# Patient Record
Sex: FEMALE | Race: WHITE | NOT HISPANIC OR LATINO | Employment: OTHER
[De-identification: names, ages, dates, MRNs, and addresses within clinical notes are randomized per-mention and may not be internally consistent; named-entity substitution may affect disease eponyms.]

---

## 2017-06-03 ENCOUNTER — HEALTH MAINTENANCE LETTER (OUTPATIENT)
Age: 47
End: 2017-06-03

## 2023-06-23 ENCOUNTER — APPOINTMENT (OUTPATIENT)
Dept: GENERAL RADIOLOGY | Facility: CLINIC | Age: 53
End: 2023-06-23
Attending: EMERGENCY MEDICINE
Payer: MEDICARE

## 2023-06-23 ENCOUNTER — HOSPITAL ENCOUNTER (EMERGENCY)
Facility: CLINIC | Age: 53
Discharge: HOME | End: 2023-06-23
Attending: EMERGENCY MEDICINE | Admitting: EMERGENCY MEDICINE
Payer: MEDICARE

## 2023-06-23 VITALS
RESPIRATION RATE: 20 BRPM | OXYGEN SATURATION: 97 % | HEART RATE: 96 BPM | WEIGHT: 185 LBS | SYSTOLIC BLOOD PRESSURE: 116 MMHG | DIASTOLIC BLOOD PRESSURE: 82 MMHG | TEMPERATURE: 97.8 F

## 2023-06-23 DIAGNOSIS — F41.9 ANXIETY: ICD-10-CM

## 2023-06-23 DIAGNOSIS — I95.9 TRANSIENT HYPOTENSION: ICD-10-CM

## 2023-06-23 DIAGNOSIS — E11.65 HYPERGLYCEMIA DUE TO DIABETES MELLITUS (H): ICD-10-CM

## 2023-06-23 LAB
ANION GAP SERPL CALCULATED.3IONS-SCNC: 14 MMOL/L (ref 7–15)
ANION GAP SERPL CALCULATED.3IONS-SCNC: 16 MMOL/L (ref 7–15)
ATRIAL RATE - MUSE: 112 BPM
B-OH-BUTYR SERPL-SCNC: 0.2 MMOL/L
BASOPHILS # BLD AUTO: 0 10E3/UL (ref 0–0.2)
BASOPHILS NFR BLD AUTO: 0 %
BUN SERPL-MCNC: 16.8 MG/DL (ref 6–20)
BUN SERPL-MCNC: 17.5 MG/DL (ref 6–20)
CALCIUM SERPL-MCNC: 9.1 MG/DL (ref 8.6–10)
CALCIUM SERPL-MCNC: 9.7 MG/DL (ref 8.6–10)
CHLORIDE SERPL-SCNC: 95 MMOL/L (ref 98–107)
CHLORIDE SERPL-SCNC: 95 MMOL/L (ref 98–107)
CREAT SERPL-MCNC: 0.57 MG/DL (ref 0.51–0.95)
CREAT SERPL-MCNC: 0.7 MG/DL (ref 0.51–0.95)
DEPRECATED HCO3 PLAS-SCNC: 22 MMOL/L (ref 22–29)
DEPRECATED HCO3 PLAS-SCNC: 22 MMOL/L (ref 22–29)
DIASTOLIC BLOOD PRESSURE - MUSE: NORMAL MMHG
EOSINOPHIL # BLD AUTO: 0.3 10E3/UL (ref 0–0.7)
EOSINOPHIL NFR BLD AUTO: 3 %
ERYTHROCYTE [DISTWIDTH] IN BLOOD BY AUTOMATED COUNT: 13.5 % (ref 10–15)
GFR SERPL CREATININE-BSD FRML MDRD: >90 ML/MIN/1.73M2
GFR SERPL CREATININE-BSD FRML MDRD: >90 ML/MIN/1.73M2
GLUCOSE SERPL-MCNC: 397 MG/DL (ref 70–99)
GLUCOSE SERPL-MCNC: 529 MG/DL (ref 70–99)
HCT VFR BLD AUTO: 43.8 % (ref 35–47)
HGB BLD-MCNC: 14.5 G/DL (ref 11.7–15.7)
IMM GRANULOCYTES # BLD: 0 10E3/UL
IMM GRANULOCYTES NFR BLD: 0 %
INTERPRETATION ECG - MUSE: NORMAL
LYMPHOCYTES # BLD AUTO: 2.1 10E3/UL (ref 0.8–5.3)
LYMPHOCYTES NFR BLD AUTO: 19 %
MCH RBC QN AUTO: 28.6 PG (ref 26.5–33)
MCHC RBC AUTO-ENTMCNC: 33.1 G/DL (ref 31.5–36.5)
MCV RBC AUTO: 86 FL (ref 78–100)
MONOCYTES # BLD AUTO: 0.6 10E3/UL (ref 0–1.3)
MONOCYTES NFR BLD AUTO: 5 %
NEUTROPHILS # BLD AUTO: 8.3 10E3/UL (ref 1.6–8.3)
NEUTROPHILS NFR BLD AUTO: 73 %
NRBC # BLD AUTO: 0 10E3/UL
NRBC BLD AUTO-RTO: 0 /100
P AXIS - MUSE: 43 DEGREES
PLATELET # BLD AUTO: 336 10E3/UL (ref 150–450)
POTASSIUM SERPL-SCNC: 4.1 MMOL/L (ref 3.4–5.3)
POTASSIUM SERPL-SCNC: 4.7 MMOL/L (ref 3.4–5.3)
PR INTERVAL - MUSE: 124 MS
QRS DURATION - MUSE: 82 MS
QT - MUSE: 336 MS
QTC - MUSE: 458 MS
R AXIS - MUSE: 38 DEGREES
RBC # BLD AUTO: 5.07 10E6/UL (ref 3.8–5.2)
SODIUM SERPL-SCNC: 131 MMOL/L (ref 136–145)
SODIUM SERPL-SCNC: 133 MMOL/L (ref 136–145)
SYSTOLIC BLOOD PRESSURE - MUSE: NORMAL MMHG
T AXIS - MUSE: 57 DEGREES
TROPONIN T SERPL HS-MCNC: 9 NG/L
VENTRICULAR RATE- MUSE: 112 BPM
WBC # BLD AUTO: 11.3 10E3/UL (ref 4–11)

## 2023-06-23 PROCEDURE — 258N000003 HC RX IP 258 OP 636: Performed by: EMERGENCY MEDICINE

## 2023-06-23 PROCEDURE — 82310 ASSAY OF CALCIUM: CPT | Performed by: EMERGENCY MEDICINE

## 2023-06-23 PROCEDURE — 36415 COLL VENOUS BLD VENIPUNCTURE: CPT | Performed by: EMERGENCY MEDICINE

## 2023-06-23 PROCEDURE — 85025 COMPLETE CBC W/AUTO DIFF WBC: CPT | Performed by: EMERGENCY MEDICINE

## 2023-06-23 PROCEDURE — 84484 ASSAY OF TROPONIN QUANT: CPT | Performed by: EMERGENCY MEDICINE

## 2023-06-23 PROCEDURE — 82010 KETONE BODYS QUAN: CPT | Performed by: EMERGENCY MEDICINE

## 2023-06-23 PROCEDURE — 80048 BASIC METABOLIC PNL TOTAL CA: CPT | Performed by: EMERGENCY MEDICINE

## 2023-06-23 PROCEDURE — 99285 EMERGENCY DEPT VISIT HI MDM: CPT | Mod: 25

## 2023-06-23 PROCEDURE — 96361 HYDRATE IV INFUSION ADD-ON: CPT

## 2023-06-23 PROCEDURE — 71046 X-RAY EXAM CHEST 2 VIEWS: CPT

## 2023-06-23 PROCEDURE — 96374 THER/PROPH/DIAG INJ IV PUSH: CPT

## 2023-06-23 PROCEDURE — 250N000011 HC RX IP 250 OP 636: Mod: JZ | Performed by: EMERGENCY MEDICINE

## 2023-06-23 PROCEDURE — 93005 ELECTROCARDIOGRAM TRACING: CPT

## 2023-06-23 RX ORDER — ONDANSETRON 2 MG/ML
4 INJECTION INTRAMUSCULAR; INTRAVENOUS ONCE
Status: COMPLETED | OUTPATIENT
Start: 2023-06-23 | End: 2023-06-23

## 2023-06-23 RX ORDER — ONDANSETRON 2 MG/ML
4 INJECTION INTRAMUSCULAR; INTRAVENOUS ONCE
Status: DISCONTINUED | OUTPATIENT
Start: 2023-06-23 | End: 2023-06-23

## 2023-06-23 RX ADMIN — SODIUM CHLORIDE 1000 ML: 9 INJECTION, SOLUTION INTRAVENOUS at 16:35

## 2023-06-23 RX ADMIN — SODIUM CHLORIDE 1000 ML: 9 INJECTION, SOLUTION INTRAVENOUS at 19:19

## 2023-06-23 RX ADMIN — ONDANSETRON 4 MG: 2 INJECTION INTRAMUSCULAR; INTRAVENOUS at 19:29

## 2023-06-23 ASSESSMENT — ACTIVITIES OF DAILY LIVING (ADL): ADLS_ACUITY_SCORE: 33

## 2023-06-23 NOTE — ED TRIAGE NOTES
Patient being referred from [facility] for low BP. Per EMS report patient BP was 80s systolic. Patient reports feeling dizzy and lightheaded with standing and walking. BP on arrival was 91/64- patient denies symptoms.      Triage Assessment     Row Name 06/23/23 9173       Triage Assessment (Adult)    Airway WDL WDL       Respiratory WDL    Respiratory WDL WDL       Skin Circulation/Temperature WDL    Skin Circulation/Temperature WDL WDL       Cardiac WDL    Cardiac WDL WDL       Peripheral/Neurovascular WDL    Peripheral Neurovascular WDL WDL       Cognitive/Neuro/Behavioral WDL    Cognitive/Neuro/Behavioral WDL WDL

## 2023-06-23 NOTE — ED NOTES
Rapid Assessment Note    History:   Sandra Lilly is a 53 year old female who presents to the ED for hypotension. Patient reports having a systolic blood pressure in the low 80s and previous TBI history, and she endorsed dizziness and lightheadedness. She reports history of anxiety. She states her last TBI treatment with brain stimulation was in the summer of 2021 and she usually gets hypotensive as a result. She reports some chest pain from anxiety that is now bilateral, previously mainly right sided. She denies fever, vomiting, or diarrhea. She reports regular nausea that she takes Zofran for and regular constipation due to her medications.      Exam:   General: Alert, No distress. Nontoxic appearance  Head: No signs of trauma.   Mouth/Throat: Oropharynx moist.   Eyes: Conjunctivae are normal. Pupils are equal..   Neck: Normal range of motion.    CV: Appears well perfused.Slightly tachycardic  Resp:No respiratory distress.  lungs are clear  MSK: Normal range of motion. No obvious deformity.   Neuro: The patient is alert and interactive. MAHMOOD. Speech normal. GCS 15  Skin: No lesion or sign of trauma noted.   Psych: normal mood and affect. behavior is normal.      Plan of Care:   I evaluated the patient and developed an initial plan of care. I discussed this plan and explained that I, or one of my partners, would be returning to complete the evaluation.     I, Iesha Sultana, am serving as a scribe to document services personally performed by Epifanio Terry MD, based on my observations and the provider's statements to me.    6/23/2023  [employer]    Portions of this medical record were completed by a scribe. UPON MY REVIEW AND AUTHENTICATION BY ELECTRONIC SIGNATURE, this confirms (a) I performed the applicable clinical services, and (b) the record is accurate.      Epifanio Terry MD  06/24/23 0158

## 2023-06-24 NOTE — DISCHARGE INSTRUCTIONS
Your blood sugars are uncontrolled.  Follow-up with your primary care doctor by phone on Monday to get restarted on your medications.  Hold your blood pressure medication, Hyzaar, until you follow-up with your primary care doctor.  Check your blood pressures once a day in the morning and do this for the next few weeks to get additional data.   4 = No assist / stand by assistance
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
## 2025-02-16 ENCOUNTER — APPOINTMENT (OUTPATIENT)
Dept: GENERAL RADIOLOGY | Facility: CLINIC | Age: 55
DRG: 202 | End: 2025-02-16
Attending: EMERGENCY MEDICINE
Payer: MEDICARE

## 2025-02-16 ENCOUNTER — HOSPITAL ENCOUNTER (INPATIENT)
Facility: CLINIC | Age: 55
LOS: 3 days | Discharge: HOME | DRG: 202 | End: 2025-02-19
Attending: EMERGENCY MEDICINE | Admitting: INTERNAL MEDICINE
Payer: MEDICARE

## 2025-02-16 DIAGNOSIS — E11.9 TYPE 2 DIABETES MELLITUS WITHOUT COMPLICATION, WITHOUT LONG-TERM CURRENT USE OF INSULIN (H): Primary | ICD-10-CM

## 2025-02-16 DIAGNOSIS — J45.901 MILD ASTHMA WITH EXACERBATION, UNSPECIFIED WHETHER PERSISTENT: ICD-10-CM

## 2025-02-16 DIAGNOSIS — R73.9 HYPERGLYCEMIA: ICD-10-CM

## 2025-02-16 LAB
ALBUMIN SERPL BCG-MCNC: 4.3 G/DL (ref 3.5–5.2)
ALP SERPL-CCNC: 116 U/L (ref 40–150)
ALT SERPL W P-5'-P-CCNC: 30 U/L (ref 0–50)
ANION GAP SERPL CALCULATED.3IONS-SCNC: 16 MMOL/L (ref 7–15)
AST SERPL W P-5'-P-CCNC: 35 U/L (ref 0–45)
B-OH-BUTYR SERPL-SCNC: 0.49 MMOL/L
BASOPHILS # BLD AUTO: 0.1 10E3/UL (ref 0–0.2)
BASOPHILS NFR BLD AUTO: 0 %
BILIRUB SERPL-MCNC: 0.3 MG/DL
BUN SERPL-MCNC: 12.6 MG/DL (ref 6–20)
CALCIUM SERPL-MCNC: 9.6 MG/DL (ref 8.8–10.4)
CHLORIDE SERPL-SCNC: 94 MMOL/L (ref 98–107)
CREAT SERPL-MCNC: 0.43 MG/DL (ref 0.51–0.95)
EGFRCR SERPLBLD CKD-EPI 2021: >90 ML/MIN/1.73M2
EOSINOPHIL # BLD AUTO: 0.2 10E3/UL (ref 0–0.7)
EOSINOPHIL NFR BLD AUTO: 2 %
ERYTHROCYTE [DISTWIDTH] IN BLOOD BY AUTOMATED COUNT: 12.5 % (ref 10–15)
EST. AVERAGE GLUCOSE BLD GHB EST-MCNC: 372 MG/DL
FLUAV RNA SPEC QL NAA+PROBE: NEGATIVE
FLUBV RNA RESP QL NAA+PROBE: NEGATIVE
GLUCOSE BLDC GLUCOMTR-MCNC: 297 MG/DL (ref 70–99)
GLUCOSE BLDC GLUCOMTR-MCNC: 322 MG/DL (ref 70–99)
GLUCOSE BLDC GLUCOMTR-MCNC: 370 MG/DL (ref 70–99)
GLUCOSE BLDC GLUCOMTR-MCNC: 419 MG/DL (ref 70–99)
GLUCOSE BLDC GLUCOMTR-MCNC: 433 MG/DL (ref 70–99)
GLUCOSE BLDC GLUCOMTR-MCNC: 488 MG/DL (ref 70–99)
GLUCOSE BLDC GLUCOMTR-MCNC: 497 MG/DL (ref 70–99)
GLUCOSE BLDC GLUCOMTR-MCNC: 543 MG/DL (ref 70–99)
GLUCOSE BLDC GLUCOMTR-MCNC: 545 MG/DL (ref 70–99)
GLUCOSE BLDC GLUCOMTR-MCNC: 594 MG/DL (ref 70–99)
GLUCOSE SERPL-MCNC: 603 MG/DL (ref 70–99)
HBA1C MFR BLD: 14.6 %
HCO3 BLDV-SCNC: 26 MMOL/L (ref 21–28)
HCO3 SERPL-SCNC: 22 MMOL/L (ref 22–29)
HCT VFR BLD AUTO: 44.6 % (ref 35–47)
HGB BLD-MCNC: 14.8 G/DL (ref 11.7–15.7)
IMM GRANULOCYTES # BLD: 0.1 10E3/UL
IMM GRANULOCYTES NFR BLD: 1 %
LACTATE BLD-SCNC: 2.1 MMOL/L
LACTATE SERPL-SCNC: 1.6 MMOL/L (ref 0.7–2)
LYMPHOCYTES # BLD AUTO: 2.6 10E3/UL (ref 0.8–5.3)
LYMPHOCYTES NFR BLD AUTO: 20 %
MCH RBC QN AUTO: 28.1 PG (ref 26.5–33)
MCHC RBC AUTO-ENTMCNC: 33.2 G/DL (ref 31.5–36.5)
MCV RBC AUTO: 85 FL (ref 78–100)
MONOCYTES # BLD AUTO: 1.1 10E3/UL (ref 0–1.3)
MONOCYTES NFR BLD AUTO: 8 %
NEUTROPHILS # BLD AUTO: 8.8 10E3/UL (ref 1.6–8.3)
NEUTROPHILS NFR BLD AUTO: 68 %
NRBC # BLD AUTO: 0 10E3/UL
NRBC BLD AUTO-RTO: 0 /100
PCO2 BLDV: 35 MM HG (ref 40–50)
PH BLDV: 7.48 [PH] (ref 7.32–7.43)
PLATELET # BLD AUTO: 436 10E3/UL (ref 150–450)
PO2 BLDV: 36 MM HG (ref 25–47)
POTASSIUM SERPL-SCNC: 5 MMOL/L (ref 3.4–5.3)
PROCALCITONIN SERPL IA-MCNC: 0.22 NG/ML
PROT SERPL-MCNC: 7.7 G/DL (ref 6.4–8.3)
RBC # BLD AUTO: 5.26 10E6/UL (ref 3.8–5.2)
RSV RNA SPEC NAA+PROBE: NEGATIVE
S PYO DNA THROAT QL NAA+PROBE: NOT DETECTED
SAO2 % BLDV: 74 % (ref 70–75)
SARS-COV-2 RNA RESP QL NAA+PROBE: NEGATIVE
SODIUM SERPL-SCNC: 132 MMOL/L (ref 135–145)
T4 FREE SERPL-MCNC: 0.81 NG/DL (ref 0.9–1.7)
TROPONIN T SERPL HS-MCNC: 7 NG/L
TSH SERPL DL<=0.005 MIU/L-ACNC: 40.12 UIU/ML (ref 0.3–4.2)
WBC # BLD AUTO: 12.8 10E3/UL (ref 4–11)

## 2025-02-16 PROCEDURE — 250N000013 HC RX MED GY IP 250 OP 250 PS 637: Performed by: INTERNAL MEDICINE

## 2025-02-16 PROCEDURE — 36415 COLL VENOUS BLD VENIPUNCTURE: CPT | Performed by: EMERGENCY MEDICINE

## 2025-02-16 PROCEDURE — 250N000011 HC RX IP 250 OP 636: Mod: JZ | Performed by: EMERGENCY MEDICINE

## 2025-02-16 PROCEDURE — 120N000001 HC R&B MED SURG/OB

## 2025-02-16 PROCEDURE — 84484 ASSAY OF TROPONIN QUANT: CPT | Performed by: EMERGENCY MEDICINE

## 2025-02-16 PROCEDURE — 94640 AIRWAY INHALATION TREATMENT: CPT

## 2025-02-16 PROCEDURE — 93005 ELECTROCARDIOGRAM TRACING: CPT

## 2025-02-16 PROCEDURE — 82803 BLOOD GASES ANY COMBINATION: CPT

## 2025-02-16 PROCEDURE — 250N000009 HC RX 250: Performed by: INTERNAL MEDICINE

## 2025-02-16 PROCEDURE — 99285 EMERGENCY DEPT VISIT HI MDM: CPT | Mod: 25

## 2025-02-16 PROCEDURE — 999N000156 HC STATISTIC RCP CONSULT EA 30 MIN

## 2025-02-16 PROCEDURE — 82962 GLUCOSE BLOOD TEST: CPT

## 2025-02-16 PROCEDURE — 250N000012 HC RX MED GY IP 250 OP 636 PS 637: Performed by: INTERNAL MEDICINE

## 2025-02-16 PROCEDURE — 99223 1ST HOSP IP/OBS HIGH 75: CPT | Mod: AI | Performed by: INTERNAL MEDICINE

## 2025-02-16 PROCEDURE — 80053 COMPREHEN METABOLIC PANEL: CPT | Performed by: EMERGENCY MEDICINE

## 2025-02-16 PROCEDURE — 85041 AUTOMATED RBC COUNT: CPT | Performed by: EMERGENCY MEDICINE

## 2025-02-16 PROCEDURE — 87651 STREP A DNA AMP PROBE: CPT | Performed by: EMERGENCY MEDICINE

## 2025-02-16 PROCEDURE — 83036 HEMOGLOBIN GLYCOSYLATED A1C: CPT | Performed by: EMERGENCY MEDICINE

## 2025-02-16 PROCEDURE — 258N000003 HC RX IP 258 OP 636: Performed by: EMERGENCY MEDICINE

## 2025-02-16 PROCEDURE — 71045 X-RAY EXAM CHEST 1 VIEW: CPT

## 2025-02-16 PROCEDURE — 84439 ASSAY OF FREE THYROXINE: CPT | Performed by: INTERNAL MEDICINE

## 2025-02-16 PROCEDURE — 250N000009 HC RX 250: Performed by: EMERGENCY MEDICINE

## 2025-02-16 PROCEDURE — 84443 ASSAY THYROID STIM HORMONE: CPT | Performed by: INTERNAL MEDICINE

## 2025-02-16 PROCEDURE — 250N000011 HC RX IP 250 OP 636: Performed by: INTERNAL MEDICINE

## 2025-02-16 PROCEDURE — 94640 AIRWAY INHALATION TREATMENT: CPT | Mod: 76

## 2025-02-16 PROCEDURE — 84145 PROCALCITONIN (PCT): CPT | Performed by: EMERGENCY MEDICINE

## 2025-02-16 PROCEDURE — 999N000157 HC STATISTIC RCP TIME EA 10 MIN

## 2025-02-16 PROCEDURE — 87637 SARSCOV2&INF A&B&RSV AMP PRB: CPT | Performed by: EMERGENCY MEDICINE

## 2025-02-16 PROCEDURE — 96374 THER/PROPH/DIAG INJ IV PUSH: CPT

## 2025-02-16 PROCEDURE — 258N000003 HC RX IP 258 OP 636: Performed by: INTERNAL MEDICINE

## 2025-02-16 PROCEDURE — 83605 ASSAY OF LACTIC ACID: CPT | Performed by: EMERGENCY MEDICINE

## 2025-02-16 PROCEDURE — 85025 COMPLETE CBC W/AUTO DIFF WBC: CPT | Performed by: EMERGENCY MEDICINE

## 2025-02-16 PROCEDURE — 82010 KETONE BODYS QUAN: CPT | Performed by: EMERGENCY MEDICINE

## 2025-02-16 RX ORDER — METHYLPREDNISOLONE SODIUM SUCCINATE 40 MG/ML
40 INJECTION INTRAMUSCULAR; INTRAVENOUS 2 TIMES DAILY
Status: DISCONTINUED | OUTPATIENT
Start: 2025-02-16 | End: 2025-02-16

## 2025-02-16 RX ORDER — GUAIFENESIN 200 MG/10ML
200 LIQUID ORAL EVERY 4 HOURS PRN
Status: DISCONTINUED | OUTPATIENT
Start: 2025-02-16 | End: 2025-02-19 | Stop reason: HOSPADM

## 2025-02-16 RX ORDER — LEVOTHYROXINE SODIUM 100 UG/1
100 TABLET ORAL
Status: DISCONTINUED | OUTPATIENT
Start: 2025-02-16 | End: 2025-02-19 | Stop reason: HOSPADM

## 2025-02-16 RX ORDER — METFORMIN HYDROCHLORIDE 500 MG/1
1000 TABLET, EXTENDED RELEASE ORAL DAILY
COMMUNITY

## 2025-02-16 RX ORDER — IPRATROPIUM BROMIDE AND ALBUTEROL SULFATE 2.5; .5 MG/3ML; MG/3ML
3 SOLUTION RESPIRATORY (INHALATION)
Status: COMPLETED | OUTPATIENT
Start: 2025-02-16 | End: 2025-02-16

## 2025-02-16 RX ORDER — ONDANSETRON 2 MG/ML
4 INJECTION INTRAMUSCULAR; INTRAVENOUS EVERY 6 HOURS PRN
Status: DISCONTINUED | OUTPATIENT
Start: 2025-02-16 | End: 2025-02-19 | Stop reason: HOSPADM

## 2025-02-16 RX ORDER — HYDROXYZINE HYDROCHLORIDE 25 MG/1
25 TABLET, FILM COATED ORAL ONCE
Status: COMPLETED | OUTPATIENT
Start: 2025-02-16 | End: 2025-02-16

## 2025-02-16 RX ORDER — BUPROPION HYDROCHLORIDE 150 MG/1
450 TABLET ORAL AT BEDTIME
Status: DISCONTINUED | OUTPATIENT
Start: 2025-02-16 | End: 2025-02-16

## 2025-02-16 RX ORDER — OXYCODONE HYDROCHLORIDE 10 MG/1
10 TABLET ORAL EVERY 4 HOURS PRN
COMMUNITY

## 2025-02-16 RX ORDER — IPRATROPIUM BROMIDE AND ALBUTEROL SULFATE 2.5; .5 MG/3ML; MG/3ML
3 SOLUTION RESPIRATORY (INHALATION)
Status: DISCONTINUED | OUTPATIENT
Start: 2025-02-16 | End: 2025-02-19 | Stop reason: HOSPADM

## 2025-02-16 RX ORDER — NICOTINE POLACRILEX 4 MG
15-30 LOZENGE BUCCAL
Status: DISCONTINUED | OUTPATIENT
Start: 2025-02-16 | End: 2025-02-16

## 2025-02-16 RX ORDER — ACETAMINOPHEN 325 MG/1
650 TABLET ORAL EVERY 4 HOURS PRN
Status: DISCONTINUED | OUTPATIENT
Start: 2025-02-16 | End: 2025-02-19 | Stop reason: HOSPADM

## 2025-02-16 RX ORDER — CYCLOBENZAPRINE HCL 5 MG
10 TABLET ORAL 3 TIMES DAILY PRN
Status: DISCONTINUED | OUTPATIENT
Start: 2025-02-16 | End: 2025-02-19 | Stop reason: HOSPADM

## 2025-02-16 RX ORDER — SODIUM CHLORIDE, SODIUM LACTATE, POTASSIUM CHLORIDE, CALCIUM CHLORIDE 600; 310; 30; 20 MG/100ML; MG/100ML; MG/100ML; MG/100ML
INJECTION, SOLUTION INTRAVENOUS CONTINUOUS
Status: DISCONTINUED | OUTPATIENT
Start: 2025-02-16 | End: 2025-02-17

## 2025-02-16 RX ORDER — LORAZEPAM 0.5 MG/1
0.5 TABLET ORAL EVERY 4 HOURS PRN
Status: DISCONTINUED | OUTPATIENT
Start: 2025-02-16 | End: 2025-02-19 | Stop reason: HOSPADM

## 2025-02-16 RX ORDER — ACETAMINOPHEN 650 MG/1
650 SUPPOSITORY RECTAL EVERY 4 HOURS PRN
Status: DISCONTINUED | OUTPATIENT
Start: 2025-02-16 | End: 2025-02-19 | Stop reason: HOSPADM

## 2025-02-16 RX ORDER — RIZATRIPTAN BENZOATE 10 MG/1
10 TABLET ORAL
COMMUNITY

## 2025-02-16 RX ORDER — DEXTROSE MONOHYDRATE 25 G/50ML
25-50 INJECTION, SOLUTION INTRAVENOUS
Status: DISCONTINUED | OUTPATIENT
Start: 2025-02-16 | End: 2025-02-19 | Stop reason: HOSPADM

## 2025-02-16 RX ORDER — LEVOTHYROXINE SODIUM 100 UG/1
1 TABLET ORAL DAILY
COMMUNITY

## 2025-02-16 RX ORDER — PROCHLORPERAZINE MALEATE 5 MG/1
10 TABLET ORAL EVERY 6 HOURS PRN
Status: DISCONTINUED | OUTPATIENT
Start: 2025-02-16 | End: 2025-02-19 | Stop reason: HOSPADM

## 2025-02-16 RX ORDER — BENZONATATE 100 MG/1
100 CAPSULE ORAL 3 TIMES DAILY PRN
Status: DISCONTINUED | OUTPATIENT
Start: 2025-02-16 | End: 2025-02-19 | Stop reason: HOSPADM

## 2025-02-16 RX ORDER — ONDANSETRON 4 MG/1
4 TABLET, ORALLY DISINTEGRATING ORAL EVERY 6 HOURS PRN
Status: DISCONTINUED | OUTPATIENT
Start: 2025-02-16 | End: 2025-02-19 | Stop reason: HOSPADM

## 2025-02-16 RX ORDER — NICOTINE POLACRILEX 4 MG
15-30 LOZENGE BUCCAL
Status: DISCONTINUED | OUTPATIENT
Start: 2025-02-16 | End: 2025-02-19 | Stop reason: HOSPADM

## 2025-02-16 RX ORDER — POLYETHYLENE GLYCOL 3350 17 G/17G
17 POWDER, FOR SOLUTION ORAL 2 TIMES DAILY PRN
Status: DISCONTINUED | OUTPATIENT
Start: 2025-02-16 | End: 2025-02-19 | Stop reason: HOSPADM

## 2025-02-16 RX ORDER — METHYLPREDNISOLONE SODIUM SUCCINATE 125 MG/2ML
125 INJECTION INTRAMUSCULAR; INTRAVENOUS ONCE
Status: COMPLETED | OUTPATIENT
Start: 2025-02-16 | End: 2025-02-16

## 2025-02-16 RX ORDER — CALCIUM CARBONATE 500 MG/1
1000 TABLET, CHEWABLE ORAL 4 TIMES DAILY PRN
Status: DISCONTINUED | OUTPATIENT
Start: 2025-02-16 | End: 2025-02-19 | Stop reason: HOSPADM

## 2025-02-16 RX ORDER — DEXTROSE MONOHYDRATE 25 G/50ML
25-50 INJECTION, SOLUTION INTRAVENOUS
Status: DISCONTINUED | OUTPATIENT
Start: 2025-02-16 | End: 2025-02-16

## 2025-02-16 RX ORDER — LIDOCAINE 40 MG/G
CREAM TOPICAL
Status: DISCONTINUED | OUTPATIENT
Start: 2025-02-16 | End: 2025-02-19 | Stop reason: HOSPADM

## 2025-02-16 RX ORDER — ALBUTEROL SULFATE 0.83 MG/ML
2.5 SOLUTION RESPIRATORY (INHALATION)
Status: DISCONTINUED | OUTPATIENT
Start: 2025-02-16 | End: 2025-02-19 | Stop reason: HOSPADM

## 2025-02-16 RX ORDER — ALBUTEROL SULFATE 90 UG/1
2 INHALANT RESPIRATORY (INHALATION) EVERY 6 HOURS PRN
Status: ON HOLD | COMMUNITY
End: 2025-02-18

## 2025-02-16 RX ORDER — ALBUTEROL SULFATE 90 UG/1
2 INHALANT RESPIRATORY (INHALATION) EVERY 4 HOURS PRN
Status: DISCONTINUED | OUTPATIENT
Start: 2025-02-16 | End: 2025-02-19 | Stop reason: HOSPADM

## 2025-02-16 RX ORDER — AMOXICILLIN 250 MG
2 CAPSULE ORAL 2 TIMES DAILY PRN
Status: DISCONTINUED | OUTPATIENT
Start: 2025-02-16 | End: 2025-02-19 | Stop reason: HOSPADM

## 2025-02-16 RX ORDER — AZITHROMYCIN 250 MG/1
500 TABLET, FILM COATED ORAL ONCE
Status: COMPLETED | OUTPATIENT
Start: 2025-02-16 | End: 2025-02-16

## 2025-02-16 RX ORDER — AZITHROMYCIN 250 MG/1
250 TABLET, FILM COATED ORAL DAILY
Status: DISCONTINUED | OUTPATIENT
Start: 2025-02-17 | End: 2025-02-19 | Stop reason: HOSPADM

## 2025-02-16 RX ORDER — DEXTROSE MONOHYDRATE 100 MG/ML
INJECTION, SOLUTION INTRAVENOUS CONTINUOUS PRN
Status: DISCONTINUED | OUTPATIENT
Start: 2025-02-16 | End: 2025-02-16

## 2025-02-16 RX ORDER — PREDNISONE 50 MG/1
50 TABLET ORAL DAILY
Status: DISCONTINUED | OUTPATIENT
Start: 2025-02-17 | End: 2025-02-16

## 2025-02-16 RX ORDER — CYCLOBENZAPRINE HCL 10 MG
10 TABLET ORAL 3 TIMES DAILY
COMMUNITY

## 2025-02-16 RX ORDER — AMOXICILLIN 250 MG
1 CAPSULE ORAL 2 TIMES DAILY PRN
Status: DISCONTINUED | OUTPATIENT
Start: 2025-02-16 | End: 2025-02-19 | Stop reason: HOSPADM

## 2025-02-16 RX ORDER — OXYCODONE HYDROCHLORIDE 10 MG/1
10 TABLET ORAL EVERY 4 HOURS PRN
Status: DISCONTINUED | OUTPATIENT
Start: 2025-02-16 | End: 2025-02-19 | Stop reason: HOSPADM

## 2025-02-16 RX ADMIN — OXYCODONE HYDROCHLORIDE 10 MG: 10 TABLET ORAL at 08:32

## 2025-02-16 RX ADMIN — CYCLOBENZAPRINE 10 MG: 10 TABLET, FILM COATED ORAL at 08:31

## 2025-02-16 RX ADMIN — METHYLPREDNISOLONE SODIUM SUCCINATE 125 MG: 125 INJECTION, POWDER, FOR SOLUTION INTRAMUSCULAR; INTRAVENOUS at 05:09

## 2025-02-16 RX ADMIN — LORAZEPAM 0.5 MG: 0.5 TABLET ORAL at 21:40

## 2025-02-16 RX ADMIN — LEVOTHYROXINE SODIUM 100 MCG: 0.1 TABLET ORAL at 10:57

## 2025-02-16 RX ADMIN — OXYCODONE HYDROCHLORIDE 10 MG: 10 TABLET ORAL at 14:15

## 2025-02-16 RX ADMIN — IPRATROPIUM BROMIDE AND ALBUTEROL SULFATE 3 ML: .5; 3 SOLUTION RESPIRATORY (INHALATION) at 20:59

## 2025-02-16 RX ADMIN — INSULIN GLARGINE 15 UNITS: 100 INJECTION, SOLUTION SUBCUTANEOUS at 08:34

## 2025-02-16 RX ADMIN — SODIUM CHLORIDE 1000 ML: 9 INJECTION, SOLUTION INTRAVENOUS at 05:09

## 2025-02-16 RX ADMIN — HYDROXYZINE HYDROCHLORIDE 25 MG: 25 TABLET, FILM COATED ORAL at 08:31

## 2025-02-16 RX ADMIN — ALBUTEROL SULFATE 2 PUFF: 108 INHALANT RESPIRATORY (INHALATION) at 08:42

## 2025-02-16 RX ADMIN — SODIUM CHLORIDE, POTASSIUM CHLORIDE, SODIUM LACTATE AND CALCIUM CHLORIDE: 600; 310; 30; 20 INJECTION, SOLUTION INTRAVENOUS at 16:26

## 2025-02-16 RX ADMIN — LORAZEPAM 0.5 MG: 0.5 TABLET ORAL at 09:19

## 2025-02-16 RX ADMIN — ONDANSETRON 4 MG: 2 INJECTION INTRAMUSCULAR; INTRAVENOUS at 22:27

## 2025-02-16 RX ADMIN — OXYCODONE HYDROCHLORIDE 10 MG: 10 TABLET ORAL at 20:36

## 2025-02-16 RX ADMIN — CYCLOBENZAPRINE 10 MG: 10 TABLET, FILM COATED ORAL at 20:36

## 2025-02-16 RX ADMIN — INSULIN ASPART 10 UNITS: 100 INJECTION, SOLUTION INTRAVENOUS; SUBCUTANEOUS at 08:38

## 2025-02-16 RX ADMIN — IPRATROPIUM BROMIDE AND ALBUTEROL SULFATE 3 ML: .5; 3 SOLUTION RESPIRATORY (INHALATION) at 05:04

## 2025-02-16 RX ADMIN — IPRATROPIUM BROMIDE AND ALBUTEROL SULFATE 3 ML: .5; 3 SOLUTION RESPIRATORY (INHALATION) at 15:55

## 2025-02-16 RX ADMIN — IPRATROPIUM BROMIDE AND ALBUTEROL SULFATE 3 ML: .5; 3 SOLUTION RESPIRATORY (INHALATION) at 05:10

## 2025-02-16 RX ADMIN — IPRATROPIUM BROMIDE AND ALBUTEROL SULFATE 3 ML: .5; 3 SOLUTION RESPIRATORY (INHALATION) at 11:39

## 2025-02-16 RX ADMIN — AZITHROMYCIN DIHYDRATE 500 MG: 250 TABLET ORAL at 08:32

## 2025-02-16 ASSESSMENT — ACTIVITIES OF DAILY LIVING (ADL)
ADLS_ACUITY_SCORE: 41
ADLS_ACUITY_SCORE: 59
ADLS_ACUITY_SCORE: 41
ADLS_ACUITY_SCORE: 41
ADLS_ACUITY_SCORE: 59
ADLS_ACUITY_SCORE: 41
ADLS_ACUITY_SCORE: 41
ADLS_ACUITY_SCORE: 59
ADLS_ACUITY_SCORE: 53
ADLS_ACUITY_SCORE: 53
ADLS_ACUITY_SCORE: 41
ADLS_ACUITY_SCORE: 59
ADLS_ACUITY_SCORE: 53
ADLS_ACUITY_SCORE: 41
ADLS_ACUITY_SCORE: 45
ADLS_ACUITY_SCORE: 59
ADLS_ACUITY_SCORE: 41

## 2025-02-16 NOTE — ED PROVIDER NOTES
Emergency Department Note      History of Present Illness     Chief Complaint   Cough and Shortness of Breath     HPI   Sandra Lilly is a 54 year old female with history of asthma and diabetes who presents to the ED for shortness of breath and chest pain. The patient has been sick with cough, rhinorrhea, shortness of breath, and chest pain for the past few days. Her cough produces yellow sputum. She has been using albuterol nebs. She also endorses a sore throat. When EMS arrived to the patient's home, they found the floor covered in feces. En route to the ED, the patient's oxygen saturation was measured at 93%-94% on room air. Her blood pressure was also measured at 130s/80s. The patient arrived to the ED on a duoneb which increased her oxygen saturation to 97%. Her blood sugars were not measured prior to arrival. Denies fever, abdominal pain, vomiting, diarrhea, dysuria. No alcohol or drug use; no smoking history. The patient has no history of recent hospitalization or intubation. No history blood clot or prolonged immobilization. She notes she has not been compliant with her DM medication due to cost. She also reports she never leaves her home and feels anxious coming to the ED.     Independent Historian   EMS as detailed above.    Review of External Notes   8/9/23 office visit    Past Medical History     Medical History and Problem List   Past Medical History:   Diagnosis Date    Anxiety     Diabetes (H)     Herniated disc     Hypertension     Memory change     PTSD (post-traumatic stress disorder)     Thyroid disease     Uncomplicated asthma        Medications   ALBUTEROL IN  BuPROPion HCl (WELLBUTRIN PO)  Cetirizine HCl (ZYRTEC PO)  ClonazePAM (KLONOPIN PO)  Cyclobenzaprine HCl (FLEXERIL PO)  Fluticasone-Salmeterol (ADVAIR DISKUS IN)  HYDROcodone-acetaminophen (NORCO) 5-325 MG per tablet  insulin NPH-insulin regular (HUMULIN MIX 70/30, NOVOLIN MIX 70/30) VIAL 100 UNITS/ML susp  LEVOTHYROXINE SODIUM  PO  lidocaine (LIDODERM) 5 % patch  lisinopril-hydrochlorothiazide (PRINZIDE,ZESTORETIC) 10-6.25 MG  METFORMIN HCL PO        Surgical History   No past surgical history on file.    Physical Exam     Patient Vitals for the past 24 hrs:   BP Temp Temp src Pulse Resp SpO2   02/16/25 0604 (!) 152/91 -- -- 102 -- 94 %   02/16/25 0549 (!) 127/99 -- -- 106 -- 93 %   02/16/25 0540 -- -- -- 106 14 93 %   02/16/25 0538 -- -- -- 107 -- 92 %   02/16/25 0526 122/81 -- -- 110 20 92 %   02/16/25 0516 (!) 132/93 -- -- 108 17 93 %   02/16/25 0456 (!) 139/92 -- -- 108 -- (!) 91 %   02/16/25 0450 -- 98.2  F (36.8  C) Oral 111 19 92 %   02/16/25 0445 (!) 158/84 -- -- 112 24 (!) 91 %     Physical Exam  Nursing note and vitals reviewed.  Constitutional: Well nourished.   Head: 2cm concern for developing kerion  Eyes: Conjunctiva normal.  Pupils are equal, round, and reactive to light.   ENT: Nose normal. Mucous membranes pink and moist.  TM normal. Mild posterior oropharyngeal erythema, no exudate. Uvula midline  Neck: Normal range of motion.  CVS: Sinus tachycardia.  Normal heart sounds.  Pulmonary: Lungs with expiratory wheezing  GI: Abdomen soft. Nontender, nondistended. No rigidity or guarding.    MSK: No calf tenderness or swelling.  Neuro: Alert. Follows simple commands.  Skin: Skin is warm and dry. No rash noted.   Psychiatric: Mildly anxious      Diagnostics     Lab Results   Labs Ordered and Resulted from Time of ED Arrival to Time of ED Departure   COMPREHENSIVE METABOLIC PANEL - Abnormal       Result Value    Sodium 132 (*)     Potassium 5.0      Carbon Dioxide (CO2) 22      Anion Gap 16 (*)     Urea Nitrogen 12.6      Creatinine 0.43 (*)     GFR Estimate >90      Calcium 9.6      Chloride 94 (*)     Glucose 603 (*)     Alkaline Phosphatase 116      AST 35      ALT 30      Protein Total 7.7      Albumin 4.3      Bilirubin Total 0.3     KETONE BETA-HYDROXYBUTYRATE QUANTITATIVE, RAPID - Abnormal    Ketone (Beta-Hydroxybutyrate)  Quantitative 0.49 (*)    GLUCOSE BY METER - Abnormal    GLUCOSE BY METER POCT 594 (*)    CBC WITH PLATELETS AND DIFFERENTIAL - Abnormal    WBC Count 12.8 (*)     RBC Count 5.26 (*)     Hemoglobin 14.8      Hematocrit 44.6      MCV 85      MCH 28.1      MCHC 33.2      RDW 12.5      Platelet Count 436      % Neutrophils 68      % Lymphocytes 20      % Monocytes 8      % Eosinophils 2      % Basophils 0      % Immature Granulocytes 1      NRBCs per 100 WBC 0      Absolute Neutrophils 8.8 (*)     Absolute Lymphocytes 2.6      Absolute Monocytes 1.1      Absolute Eosinophils 0.2      Absolute Basophils 0.1      Absolute Immature Granulocytes 0.1      Absolute NRBCs 0.0     ISTAT GASES LACTATE VENOUS POCT - Abnormal    Lactic Acid POCT 2.1 (*)     Bicarbonate Venous POCT 26      O2 Sat, Venous POCT 74      pCO2 Venous POCT 35 (*)     pH Venous POCT 7.48 (*)     pO2 Venous POCT 36     GLUCOSE BY METER - Abnormal    GLUCOSE BY METER POCT 545 (*)    TROPONIN T, HIGH SENSITIVITY - Normal    Troponin T, High Sensitivity 7     INFLUENZA A/B, RSV AND SARS-COV2 PCR - Normal    Influenza A PCR Negative      Influenza B PCR Negative      RSV PCR Negative      SARS CoV2 PCR Negative     GROUP A STREPTOCOCCUS PCR THROAT SWAB - Normal    Group A strep by PCR Not Detected     GLUCOSE MONITOR NURSING POCT   GLUCOSE MONITOR NURSING POCT   D DIMER QUANTITATIVE   TROPONIN T, HIGH SENSITIVITY   GLUCOSE MONITOR NURSING POCT   GLUCOSE MONITOR NURSING POCT   HEMOGLOBIN A1C   PROCALCITONIN   BLOOD CULTURE   BLOOD CULTURE       Imaging   XR Chest Port 1 View   Final Result   IMPRESSION:   Heart size and pulmonary vascularity within normal limits. No focal lung infiltrates. Osseous structures grossly intact. Visualized upper abdomen unremarkable.          EKG   ECG taken at 0504, ECG read at 0504  Sinus tachycardia    Rate 112 bpm. GA interval 112 ms. QRS duration 76 ms. QT/QTc 332/453 ms. P-R-T axes 49 11 67.    Independent Interpretation    None    ED Course      Medications Administered   Medications   hydrOXYzine HCl (ATARAX) tablet 25 mg (has no administration in time range)   insulin aspart (NovoLOG) injection (RAPID ACTING) (has no administration in time range)   insulin glargine (LANTUS PEN) injection 25 Units (has no administration in time range)   sodium chloride 0.9% BOLUS 1,000 mL (1,000 mLs Intravenous $New Bag 2/16/25 0509)   ipratropium - albuterol 0.5 mg/2.5 mg/3 mL (DUONEB) neb solution 3 mL (3 mLs Nebulization $Given 2/16/25 0510)   methylPREDNISolone Na Suc (solu-MEDROL) injection 125 mg (125 mg Intravenous $Given 2/16/25 0509)   sodium chloride 0.9% BOLUS 1,000 mL (1,000 mLs Intravenous $New Bag 2/16/25 0509)       Procedures   Procedures     Discussion of Management   None    ED Course   ED Course as of 02/16/25 0616   Sun Feb 16, 2025   0436 I obtained history and performed physical exam as noted above.    0542 I rechecked the patient.    0608 Patient reports improvement after nebulizer treatments   0615 I spoke to Dr. Canales, hospitalist       Additional Documentation  None    Medical Decision Making / Diagnosis     CMS Diagnoses: None and Lactic acid elevated due to dehydration. At this time there are no signs of sepsis or septic shock    MIPS       None    MDM   Sandra Lilly is a 54 year old female with known history of diabetes and asthma presenting for cough for the past few days.  Patient with audible wheezing on arrival though overall nontoxic.  She was given a additional nebulizer treatments as well as IV steroids with significant improvements in her work of breathing.  Screening EKG without ischemic changes.  High-sensitivity screening troponin negative.  She denies any active chest pain and I doubt ACS.  Labs returned with notable hyperglycemia though no evidence to suggest DKA.  Discussed consideration of insulin drip given patient received steroids in the ED though hospitalist feels comfortable holding off on  insulin drip at this point in time.  No evidence of obvious pneumonia on chest x-ray.  Strong suspicion more viral etiology precipitated presentation today.  No clinical evidence to suggest strep pharyngitis, peritonsillar abscess, retropharyngeal abscess or epiglottitis. Patient is currently not requiring supplemental oxygen and is otherwise hemodynamically stable.     Disposition   The patient was admitted to the hospital.     Diagnosis     ICD-10-CM    1. Mild asthma with exacerbation, unspecified whether persistent  J45.901       2. Hyperglycemia  R73.9            Discharge Medications   New Prescriptions    No medications on file         Scribe Disclosure:  Augustus HERRING, am serving as a scribe at 4:44 AM on 2/16/2025 to document services personally performed by Alice Bhatia DO based on my observations and the provider's statements to me.           Alice Bhatia DO  02/16/25 0622

## 2025-02-16 NOTE — CARE PLAN
"[facility]    ED Boarding Nurse Handoff Addendum Report:    Date/time: 2/16/2025, 10:41 AM    Activity Level: independent    Fall Risk: Yes:  arm band in place, patient and family education, activity supervised, and room door open    Active Infusions: None    Current Meds Due: Review MAR    Current care needs: Blood glucose monitoring/management    Oxygen requirements (liters/min and/or FiO2): None    Respiratory status: Room air    Vital signs (within last 30 minutes):    Vitals:    02/16/25 0626 02/16/25 0636 02/16/25 0646 02/16/25 1033   BP: (!) 162/87  (!) 157/88    Pulse: 104 106 105    Resp:       Temp:       TempSrc:       SpO2: 96% 96% 95%    Weight:    79.8 kg (175 lb 14.8 oz)   Height:    1.651 m (5' 5\")     BP (!) 157/88   Pulse 105   Temp 98.2  F (36.8  C) (Oral)   Resp 14   Ht 1.651 m (5' 5\")   Wt 79.8 kg (175 lb 14.8 oz)   SpO2 95%   BMI 29.28 kg/m        Focused assessment within last 30 minutes:    A&OX4, VSS on RA. Flu-like sx, intermittent cough. Inspiratory/expiratory wheezing, PRN inhaler. BS elevated, insulin given per orders. Ind to bathroom. PRN Ativan given for anxiety. PRN flexeril/oxycodone given for generalized chronic pain. Pt desatting to 89%, placed on 2L O2 via NC.     ED Boarding Nurse name: Nohemi Brian RN    "

## 2025-02-16 NOTE — PHARMACY-ADMISSION MEDICATION HISTORY
Pharmacy Intern Admission Medication History    Admission medication history is complete. The information provided in this note is only as accurate as the sources available at the time of the update.    Information Source(s): Patient and [facility] via in-person    Pertinent Information: The patient reports that she takes Metformin and Levothyroxine but does not know the strength of the medications. She states that she took Metformin yesterday but it has been a while since she took Levothyroxine. I do not see any recent fills for these medications within the last year in [facility]. I tried calling the [facility] where she picks up her other medications to verify if they have any recent dispenses but the pharmacy is closed. --> [facility] dispense history: levothyroxine 100mcg last dispensed June 2023, metformin ER 500mg tabs (2 tabs daily) last dispensed July 2023, and Wellbutrin 150mg 1 tab daily last dispensed July 2023. Whitney Her [facility] on 2/16/2025 at 10:57 AM      Changes made to PTA medication list:  Added: Oxycodone, Rizatriptan  Deleted: Bupropion, Cetirizine, Clonazepam, Advair Diskus, Norco, Insulin, Lidocaine Patch, Lisinopril-Hydrochlorothiazide  Changed: Levothyroxine PO -> Levothyroxine 1 tab every evening; Metformin PO -> Metformin 1 tab BID    Allergies reviewed with patient and updates made in EHR: yes    Medication History Completed By: Geovanny Petersen 2/16/2025 9:14 AM      PTA Med List   Medication Sig Last Dose/Taking    albuterol (PROAIR HFA/PROVENTIL HFA/VENTOLIN HFA) 108 (90 Base) MCG/ACT inhaler Inhale 2 puffs into the lungs every 6 hours as needed for shortness of breath or wheezing. Unknown    cyclobenzaprine (FLEXERIL) 10 MG tablet Take 10 mg by mouth 3 times daily. 2/15/2025 Evening    levothyroxine (SYNTHROID/LEVOTHROID) 100 MCG tablet Take 1 tablet by mouth daily. Unknown    metFORMIN (GLUCOPHAGE XR) 500 MG 24 hr tablet Take 1,000 mg by mouth daily.  2/15/2025    oxyCODONE IR (ROXICODONE) 10 MG tablet Take 10 mg by mouth every 4 hours as needed for severe pain. Unknown    rizatriptan (MAXALT) 10 MG tablet Take 10 mg by mouth at onset of headache for migraine. Unknown

## 2025-02-16 NOTE — PLAN OF CARE
UPDATED PLAN OF CARE FOR 02/16/25    Seen at bedside and case discussed with nursing.  Still severe hyperglycemia.  Working on control of this.  Breathing much improved and up walking the halls of the ED without difficulty.  Patient very anxious and tangential, may be side effect of steroids but appears to have other mental health illnesses as well.    -Will discontinue steroids given clinical improvement and side effects noted above  -Continue scheduled nebulizers and azithromycin  -Will work on blood sugar control throughout the day  -HgA1c of 14.  Will be needing insulin initiation at discharge (says she has been on this in the past)  -Pharmacy consultation for insulin teaching  -Pharmacy liaison consult for maintenance inhaler coverage    Derian Weaver MD

## 2025-02-16 NOTE — ED NOTES
[facility]  ED Nurse Handoff Report    ED Chief complaint: Shortness of Breath  . ED Diagnosis:   Final diagnoses:   Mild asthma with exacerbation, unspecified whether persistent   Hyperglycemia       Allergies:   Allergies   Allergen Reactions    Other [No Clinical Screening - See Comments]      Flu shot       Code Status: Full Code    Activity level - Baseline/Home:  independent.  Activity Level - Current:   assist of 1 and cane .   Lift room needed: No.   Bariatric: No   Needed: No   Isolation: Yes.   Infection: Not Applicable.     Respiratory status: Nasal cannula 3 L's    Vital Signs (within 30 minutes):   Vitals:    02/16/25 0616 02/16/25 0626 02/16/25 0636 02/16/25 0646   BP: (!) 153/114 (!) 162/87  (!) 157/88   Pulse: 101 104 106 105   Resp:       Temp:       TempSrc:       SpO2: 92% 96% 96% 95%       Cardiac Rhythm:  ,      Pain level:    Patient confused: No.   Patient Falls Risk: nonskid shoes/slippers when out of bed, arm band in place, and patient and family education.   Elimination Status:  due to void      Patient Report - Initial Complaint: Arrives via EMS from home. Per EMS, patient called 911 with complaints of shortness of breath and some mild chest pain, which is getting better. EMS gave 1 duoneb. Pt states she feels a little bit better. On arrival . Pt states she uses neb machine at home and her rescue inhaler ran out.  Per EMS, she lives there with spouse. They state the whole apartment was very unkempt and feces was all over the floor.    Focused Assessment:   54 year old female with history of asthma and diabetes who presents to the ED for shortness of breath and chest pain. The patient has been sick with cough, rhinorrhea, shortness of breath, and chest pain for the past few days. Her cough produces yellow sputum. She has been using albuterol nebs. She also endorses a sore throat. When EMS arrived to the patient's home, they found the floor covered in  feces. En route to the ED, the patient's oxygen saturation was measured at 93%-94% on room air. Her blood pressure was also measured at 130s/80s. The patient arrived to the ED on a duoneb which increased her oxygen saturation to 97%. Her blood sugars were not measured prior to arrival. Denies fever, abdominal pain, vomiting, diarrhea, dysuria. No alcohol or drug use; no smoking history. The patient has no history of recent hospitalization or intubation. No history blood clot or prolonged immobilization. She notes she has not been compliant with her DM medication due to cost. She also reports she never leaves her home and feels anxious coming to the ED.      Independent Historian   EMS as detailed above.     Review of External Notes   8/9/23 office visit     Past Medical History      Medical History and Problem List        Past Medical History:   Diagnosis Date    Anxiety      Diabetes (H)      Herniated disc      Hypertension      Memory change      PTSD (post-traumatic stress disorder)      Thyroid disease      Uncomplicated asthma                 Medications     ALBUTEROL IN     BuPROPion HCl (WELLBUTRIN PO)  Cetirizine HCl (ZYRTEC PO)  ClonazePAM (KLONOPIN PO)  Cyclobenzaprine HCl (FLEXERIL PO)  Fluticasone-Salmeterol (ADVAIR DISKUS IN)  HYDROcodone-acetaminophen (NORCO) 5-325 MG per tablet  insulin NPH-insulin regular (HUMULIN MIX 70/30, NOVOLIN MIX 70/30) VIAL 100 UNITS/ML susp  LEVOTHYROXINE SODIUM PO  lidocaine (LIDODERM) 5 % patch  lisinopril-hydrochlorothiazide (PRINZIDE,ZESTORETIC) 10-6.25 MG  METFORMIN HCL PO            Abnormal Results:   Labs Ordered and Resulted from Time of ED Arrival to Time of ED Departure   COMPREHENSIVE METABOLIC PANEL - Abnormal       Result Value    Sodium 132 (*)     Potassium 5.0      Carbon Dioxide (CO2) 22      Anion Gap 16 (*)     Urea Nitrogen 12.6      Creatinine 0.43 (*)     GFR Estimate >90      Calcium 9.6      Chloride 94 (*)     Glucose 603 (*)     Alkaline  Phosphatase 116      AST 35      ALT 30      Protein Total 7.7      Albumin 4.3      Bilirubin Total 0.3     KETONE BETA-HYDROXYBUTYRATE QUANTITATIVE, RAPID - Abnormal    Ketone (Beta-Hydroxybutyrate) Quantitative 0.49 (*)    GLUCOSE BY METER - Abnormal    GLUCOSE BY METER POCT 594 (*)    CBC WITH PLATELETS AND DIFFERENTIAL - Abnormal    WBC Count 12.8 (*)     RBC Count 5.26 (*)     Hemoglobin 14.8      Hematocrit 44.6      MCV 85      MCH 28.1      MCHC 33.2      RDW 12.5      Platelet Count 436      % Neutrophils 68      % Lymphocytes 20      % Monocytes 8      % Eosinophils 2      % Basophils 0      % Immature Granulocytes 1      NRBCs per 100 WBC 0      Absolute Neutrophils 8.8 (*)     Absolute Lymphocytes 2.6      Absolute Monocytes 1.1      Absolute Eosinophils 0.2      Absolute Basophils 0.1      Absolute Immature Granulocytes 0.1      Absolute NRBCs 0.0     ISTAT GASES LACTATE VENOUS POCT - Abnormal    Lactic Acid POCT 2.1 (*)     Bicarbonate Venous POCT 26      O2 Sat, Venous POCT 74      pCO2 Venous POCT 35 (*)     pH Venous POCT 7.48 (*)     pO2 Venous POCT 36     GLUCOSE BY METER - Abnormal    GLUCOSE BY METER POCT 545 (*)    HEMOGLOBIN A1C - Abnormal    Estimated Average Glucose 372 (*)     Hemoglobin A1C 14.6 (*)    GLUCOSE BY METER - Abnormal    GLUCOSE BY METER POCT 419 (*)    TROPONIN T, HIGH SENSITIVITY - Normal    Troponin T, High Sensitivity 7     INFLUENZA A/B, RSV AND SARS-COV2 PCR - Normal    Influenza A PCR Negative      Influenza B PCR Negative      RSV PCR Negative      SARS CoV2 PCR Negative     PROCALCITONIN - Normal    Procalcitonin 0.22     GROUP A STREPTOCOCCUS PCR THROAT SWAB - Normal    Group A strep by PCR Not Detected     GLUCOSE MONITOR NURSING POCT   TSH WITH FREE T4 REFLEX   GLUCOSE MONITOR NURSING POCT   GLUCOSE MONITOR NURSING POCT   GLUCOSE MONITOR NURSING POCT   GLUCOSE MONITOR NURSING POCT   GLUCOSE MONITOR NURSING POCT   RESPIRATORY AEROBIC BACTERIAL CULTURE        XR  Chest Port 1 View   Final Result   IMPRESSION:   Heart size and pulmonary vascularity within normal limits. No focal lung infiltrates. Osseous structures grossly intact. Visualized upper abdomen unremarkable.          Treatments provided: See MAR  Family Comments: alone  OBS brochure/video discussed/provided to patient:  N/A  ED Medications:   Medications   hydrOXYzine HCl (ATARAX) tablet 25 mg (has no administration in time range)   oxyCODONE (ROXICODONE) tablet 10 mg (has no administration in time range)   cyclobenzaprine (FLEXERIL) tablet 10 mg (has no administration in time range)   buPROPion (WELLBUTRIN XL) 24 hr tablet 450 mg (has no administration in time range)   insulin aspart (NovoLOG) injection (RAPID ACTING) (has no administration in time range)   insulin glargine (LANTUS PEN) injection 20 Units (has no administration in time range)   LORazepam (ATIVAN) tablet 0.5 mg (has no administration in time range)   lidocaine 1 % 0.1-1 mL (has no administration in time range)   lidocaine (LMX4) cream (has no administration in time range)   sodium chloride (PF) 0.9% PF flush 3 mL (has no administration in time range)   sodium chloride (PF) 0.9% PF flush 3 mL (has no administration in time range)   senna-docusate (SENOKOT-S/PERICOLACE) 8.6-50 MG per tablet 1 tablet (has no administration in time range)     Or   senna-docusate (SENOKOT-S/PERICOLACE) 8.6-50 MG per tablet 2 tablet (has no administration in time range)   calcium carbonate (TUMS) chewable tablet 1,000 mg (has no administration in time range)   glucose gel 15-30 g (has no administration in time range)     Or   dextrose 50 % injection 25-50 mL (has no administration in time range)     Or   glucagon injection 1 mg (has no administration in time range)   azithromycin (ZITHROMAX) tablet 500 mg (has no administration in time range)     Followed by   azithromycin (ZITHROMAX) tablet 250 mg (has no administration in time range)   acetaminophen (TYLENOL) tablet  650 mg (has no administration in time range)     Or   acetaminophen (TYLENOL) Suppository 650 mg (has no administration in time range)   melatonin tablet 5 mg (has no administration in time range)   polyethylene glycol (MIRALAX) Packet 17 g (has no administration in time range)   prochlorperazine (COMPAZINE) injection 10 mg (has no administration in time range)     Or   prochlorperazine (COMPAZINE) tablet 10 mg (has no administration in time range)   ondansetron (ZOFRAN ODT) ODT tab 4 mg (has no administration in time range)     Or   ondansetron (ZOFRAN) injection 4 mg (has no administration in time range)   ipratropium - albuterol 0.5 mg/2.5 mg/3 mL (DUONEB) neb solution 3 mL (has no administration in time range)   albuterol (PROVENTIL) neb solution 2.5 mg (has no administration in time range)   albuterol (PROVENTIL HFA/VENTOLIN HFA) inhaler (has no administration in time range)   benzocaine-menthol (CHLORASEPTIC) 6-10 MG lozenge 1 lozenge (has no administration in time range)   insulin aspart (NovoLOG) injection (RAPID ACTING) (has no administration in time range)   insulin aspart (NovoLOG) injection (RAPID ACTING) (has no administration in time range)   insulin aspart (NovoLOG) injection (RAPID ACTING) (has no administration in time range)   methylPREDNISolone sodium succinate (SOLU-MEDROL) injection 40 mg (has no administration in time range)   benzonatate (TESSALON) capsule 100 mg (has no administration in time range)   guaiFENesin (ROBITUSSIN) 20 mg/mL solution 200 mg (has no administration in time range)   sodium chloride 0.9% BOLUS 1,000 mL (1,000 mLs Intravenous $New Bag 2/16/25 0509)   ipratropium - albuterol 0.5 mg/2.5 mg/3 mL (DUONEB) neb solution 3 mL (3 mLs Nebulization $Given 2/16/25 0510)   methylPREDNISolone Na Suc (solu-MEDROL) injection 125 mg (125 mg Intravenous $Given 2/16/25 0509)   sodium chloride 0.9% BOLUS 1,000 mL (1,000 mLs Intravenous $New Bag 2/16/25 0509)       Drips infusing:  No  For  the majority of the shift this patient was Green.   Interventions performed were     Sepsis treatment initiated: No    Cares/treatment/interventions/medications to be completed following ED care:     ED Nurse Name: Maritza Hampton RN  6:56 AM   RECEIVING UNIT ED HANDOFF REVIEW    Above ED Nurse Handoff Report was reviewed: No  Reviewed by: Michelle Bonner RN on February 16, 2025 at 12:32 PM   NEVAEH Deshpande called the ED to inform them the note was read: No

## 2025-02-16 NOTE — ED TRIAGE NOTES
Arrives via EMS from home. Per EMS, patient called 911 with complaints of shortness of breath and some mild chest pain, which is getting better. EMS gave 1 duoneb. Pt states she feels a little bit better. On arrival . Pt states she uses neb machine at home and her rescue inhaler ran out.  Per EMS, she lives there with spouse. They state the whole apartment was very unkempt and feces was all over the floor.      Triage Assessment (Adult)       Row Name 02/16/25 0445          Triage Assessment    Airway WDL WDL        Respiratory WDL    Respiratory WDL X;rhythm/pattern;cough     Rhythm/Pattern, Respiratory depth regular;shortness of breath     Cough Frequency frequent        Skin Circulation/Temperature WDL    Skin Circulation/Temperature WDL WDL        Cardiac WDL    Cardiac WDL X;chest pain        Chest Pain Assessment    Chest Pain Location midsternal     Character aching  Is getting better.     Associated Signs/Symptoms anxiety;dyspnea     Chest Pain Intervention 12-lead ECG obtained        Peripheral/Neurovascular WDL    Peripheral Neurovascular WDL WDL        Cognitive/Neuro/Behavioral WDL    Cognitive/Neuro/Behavioral WDL WDL

## 2025-02-16 NOTE — H&P
[facility]  Hospitalist Admission Note  Name: Sandra Lilly    MRN: [id number]  YOB: 1970    Age: 54 year old  Date of admission: 2/16/2025  Primary care provider: Toño Lewis    Chief Complaint:  cough, shortness of breath    Assessment and Plan:   Asthma with acute exacerbation  Suspect viral URI: Presenting with almost a week of cough productive of yellow sputum, rhinorrhea, fatigue, and progressive shortness of breath with wheezing despite using albuterol.  Mild tachypnea and wheezing in the ER consistent with asthma exacerbation.  She has a leukocytosis at 12.8, but is afebrile, and there is no infiltrate on chest x-ray to suspect bacterial pneumonia.  COVID-19, influenza A/B, RSV PCR negative.  Group A strep PCR negative.  She is not on a controller inhaler for her asthma.  She does not smoke or vape.  She was placed on some supplemental oxygen in the ER, I do not see any documented hypoxia.  Received 125 mg IV Solu-Medrol in the ER along with 3 DuoNebs.  -Continue IV Solu-Medrol 40 mg twice daily  -Scheduled DuoNebs 4 times daily.  Albuterol nebs and inhaler as needed. (She needs a refill of albuterol inhaler on discharge, she does have a specific preference of which one)  -Z-Bunny in case of bacterial bronchitis  -Continuous pulse ox, wean supplemental oxygen  -Tessalon Perles and guaifenesin as needed  -Sputum culture    Hyperglycemia  Poorly controlled IDDM type II: Glucose in the 500-600 range in the ER.  Says she takes metformin, but does not know the dose.  She was on glargine 10 units daily in the past and she said her glucose was good at this dose, but I do not trust the accuracy of this.  Has been off insulin for 6 months because she could not afford it and her primary care doctor moved to [place].  A1c 14.6 in the ER.  She is not in DKA with a bicarb of 22 on her BMP and VBG showing pH 7.48, pCO2 35, pO2 36, bicarb 26.  Slightly elevated at  0.49, states she has had minimal p.o. intake for the last day or 2.  -Anticipate further hyperglycemia with steroids so we will get her insulin stat.  She received 2 L IV NS  Hold metformin given elevated lactic acid-ketones only  -Ordered 10 units aspart to be given now  -Glucose down to 400s after fluids, ordered 15 units glargine in the morning to be given now  -1 unit per 15 g, higher aspart 3 times daily with meals and medium dose sliding scale aspart  -Watch glucose closely every 2 hours during the day today until glucose levels are improving to prevent her going into DKA  -BMP tomorrow  -She will need refills of insulin on discharge  -Pharmacy liaison consult for insulin costs    Leukocytosis  Elevated lactic acid  Sinus tachycardia: Tachycardic in the low 100s, leukocytosis of 12.8, and lactic acid slightly elevated 2.1.  She did receive a DuoNeb en route.  Suspect all this is secondary to her asthma exacerbation.  Procalcitonin not elevated no infiltrate on chest x-ray.  She is afebrile.  -No antibiotics  -Will not recheck lactic acid as she just received additional 3 DuoNebs  -Hold PTA metformin    Anxiety  PTSD  TBI: She has short-term memory loss from her previous TBI.  She also has significant issues with anxiety.  She takes Wellbutrin  mg in the evening.  Very anxious in the ER.  -Resume PTA Wellbutrin  -Oral lorazepam 0.5 mg every 4 as needed for anxiety    Chronic back pain: She is chronically on oxycodone 10 mg every 5 hours or so at home along with cyclobenzaprine 10 mg 3 times daily.  Reviewed [place]  which is consistent with this dosing and recent fills.  -cyclobenzaprine 10 mg 3 times daily as needed  -oxycodone 10 mg every 4 hours as needed for severe pain  -Acetaminophen for mild pain    Hypothyroidism: Has been off of her levothyroxine for about 6 months.  She thinks she is on 125 mcg daily before.  -Added on TSH and free T4  -Depending on levels she will need levothyroxine  restarted here and prescription on discharge    History of migraines: Previously prescribed rizatriptan as needed.    DVT Prophylaxis: Pneumatic Compression Devices  Code Status: Full Code  FEN: Regular diet  Discharge Dispo: Home  Estimated Disch Date / # of Days until Disch: Admit inpatient for asthma exacerbation.  Anticipate 2 night hospitalization for steroids to take effect.      History of Present Illness:  Sandra Lilly is a 54 year old female with PMH including asthma, poorly controlled IDDM type II, hypothyroidism, anxiety, PTSD, TBI, HTN, migraines, GERD, and chronic back pain on oxycodone chronically who presents via EMS with cough and shortness of breath.  For most a week she has a cough productive of yellow sputum.  She has been progressively short of breath and wheezing despite using albuterol inhaler.  She reports rhinorrhea and feeling generally unwell.  Denies any fevers or chills.  Has had some chest discomfort which she attributes to anxiety.  She has been extremely anxious with her difficulty breathing.  She says she normally avoids going to the doctor or the hospital due to her anxiety, but she is could not breathe so she called EMS.  She has not been taking any insulin for about 6 months due to not being able to afford it and her primary care doctor moved to [place].  She has also not been taking her levothyroxine for this period of time.  She does report taking metformin.  She does not smoke or vape.  Per EMS there was a lot of feces on the floor.    History obtained from patient, medical record, and from Dr. Bhatia in the emergency department.  Blood pressure 127/99, tachycardic at 106, temperature 98.2  F, oxygen in the low 90s on room air.  Respiratory rate in the 20s.  Initial labs show leukocytosis of 12.8, hemoglobin 14.8, platelet count 436.  Sodium is low 132 although this corrects to normal for glucose of 603.  Bicarb is normal at 22.  LFTs normal.  Her A1c is 14.6.   Troponin T normal at 7.  Lactic acid elevated at 2.1.  Ketones minimally elevated 0.49.  She is a leukocytosis of 12.8, hemoglobin 14.8, platelet count 436.  VBG shows pH 7.48, pCO2 35, pO2 36, bicarb 26.  Group A strep PCR negative.  COVID-19, influenza A/B, RSV PCR negative.  Procalcitonin 0.22.  EKG shows sinus tachycardia.  Chest x-ray does not show infiltrate.  She received 125 mg IV Solu-Medrol, 2 L IV normal saline, and 3 DuoNebs.  Will give her subcutaneous aspart and glargine now stat and admit her to inpatient medical bed.       Clinically Significant Risk Factors Present on Admission         # Hyponatremia: Lowest Na = 132 mmol/L in last 2 days, will monitor as appropriate  # Hypochloremia: Lowest Cl = 94 mmol/L in last 2 days, will monitor as appropriate          # Hypertension: Home medication list includes antihypertensive(s)          # DMII: A1C = 14.6 % (Ref range: <5.7 %) within past 6 months        # Asthma: noted on problem list                  Past Medical History reviewed:  Past Medical History:   Diagnosis Date    Anxiety     Chronic pain     On oxycodone for chronic back pain    Diabetes (H)     Herniated disc     Hypertension     Memory change     PTSD (post-traumatic stress disorder)     Thyroid disease     Uncomplicated asthma      Past Surgical History reviewed:  No past surgical history on file.  Social History reviewed:  Social History     Tobacco Use    Smoking status: Every Day    Smokeless tobacco: Not on file   Substance Use Topics    Alcohol use: No     Social History     Social History Narrative    Not on file     Family History reviewed:  Reviewed and noncontributory to this admission    Allergies:  Allergies   Allergen Reactions    Other [No Clinical Screening - See Comments]      Flu shot     Medications:  Oxycodone 10 mg every 4 to 6 hours as needed  Cyclobenzaprine 10 mg 3 times daily  Albuterol inhaler  Metformin  Wellbutrin  mg at bedtime    Review of Systems:  A  Comprehensive greater than 10 system review of systems was carried out.  Pertinent positives and negatives are noted above.  Otherwise negative.     Physical Exam:  Blood pressure (!) 152/91, pulse 102, temperature 98.2  F (36.8  C), temperature source Oral, resp. rate 14, SpO2 94%.  Wt Readings from Last 1 Encounters:   06/23/23 83.9 kg (185 lb)     Exam:  Constitutional: Awake, appears anxious and mildly short of breath, receiving a neb treatment  Eyes: sclera injected  HEENT:   MMM  Respiratory: Mild tachypnea.  She does have bilateral expiratory wheeze following her neb, but she seems to be forcing exhalation.  She does have some bilateral rhonchi.  Cardiovascular: Regular tachycardia without murmur  GI: non-tender, not distended, bowel sounds present  Skin: Dry skin, but no rash  Musculoskeletal/extremities:  No edema  Neurologic: A&O, speech clear, slight diffuse tremor  Psychiatric: Very anxious, but cooperative    Lab and imaging data personally reviewed:  Labs:  Recent Labs   Lab 02/16/25  0450   PHV 7.48*   PO2V 36   PCO2V 35*   HCO3V 26     Recent Labs   Lab 02/16/25  0449   WBC 12.8*   HGB 14.8   HCT 44.6   MCV 85        Recent Labs   Lab 02/16/25  0644 02/16/25  0549 02/16/25  0449   NA  --   --  132*   POTASSIUM  --   --  5.0   CHLORIDE  --   --  94*   CO2  --   --  22   ANIONGAP  --   --  16*   * 545* 603*   BUN  --   --  12.6   CR  --   --  0.43*   GFRESTIMATED  --   --  >90   SHARON  --   --  9.6   PROTTOTAL  --   --  7.7   ALBUMIN  --   --  4.3   BILITOTAL  --   --  0.3   ALKPHOS  --   --  116   AST  --   --  35   ALT  --   --  30     Recent Labs   Lab 02/16/25  0450   LACT 2.1*     A1c 14.6  Ketones 0.49  Procalcitonin 0.22  Troponin T 7  COVID-19, influenza A/B, RSV PCR negative  Group A strep PCR negative    EKG: Sinus tachycardia    Imaging:  Recent Results (from the past 24 hours)   XR Chest Port 1 View    Narrative    EXAM: XR CHEST PORT 1 VIEW  LOCATION: [facility]  HOSPITAL  DATE: 2/16/2025    INDICATION: sob  COMPARISON: None.      Impression    IMPRESSION:   Heart size and pulmonary vascularity within normal limits. No focal lung infiltrates. Osseous structures grossly intact. Visualized upper abdomen unremarkable.       Jase Canales MD  Hospitalist  [facility]

## 2025-02-17 LAB
ANION GAP SERPL CALCULATED.3IONS-SCNC: 13 MMOL/L (ref 7–15)
ATRIAL RATE - MUSE: 112 BPM
BUN SERPL-MCNC: 10.1 MG/DL (ref 6–20)
CALCIUM SERPL-MCNC: 9.1 MG/DL (ref 8.8–10.4)
CHLORIDE SERPL-SCNC: 100 MMOL/L (ref 98–107)
CREAT SERPL-MCNC: 0.42 MG/DL (ref 0.51–0.95)
DIASTOLIC BLOOD PRESSURE - MUSE: NORMAL MMHG
EGFRCR SERPLBLD CKD-EPI 2021: >90 ML/MIN/1.73M2
GLUCOSE BLDC GLUCOMTR-MCNC: 111 MG/DL (ref 70–99)
GLUCOSE BLDC GLUCOMTR-MCNC: 186 MG/DL (ref 70–99)
GLUCOSE BLDC GLUCOMTR-MCNC: 191 MG/DL (ref 70–99)
GLUCOSE BLDC GLUCOMTR-MCNC: 203 MG/DL (ref 70–99)
GLUCOSE BLDC GLUCOMTR-MCNC: 340 MG/DL (ref 70–99)
GLUCOSE BLDC GLUCOMTR-MCNC: 389 MG/DL (ref 70–99)
GLUCOSE BLDC GLUCOMTR-MCNC: 434 MG/DL (ref 70–99)
GLUCOSE BLDC GLUCOMTR-MCNC: 462 MG/DL (ref 70–99)
GLUCOSE SERPL-MCNC: 100 MG/DL (ref 70–99)
HCO3 SERPL-SCNC: 24 MMOL/L (ref 22–29)
HOLD SPECIMEN: NORMAL
INTERPRETATION ECG - MUSE: NORMAL
MAGNESIUM SERPL-MCNC: 2 MG/DL (ref 1.7–2.3)
P AXIS - MUSE: 49 DEGREES
PHOSPHATE SERPL-MCNC: 3.6 MG/DL (ref 2.5–4.5)
POTASSIUM SERPL-SCNC: 3.6 MMOL/L (ref 3.4–5.3)
PR INTERVAL - MUSE: 112 MS
QRS DURATION - MUSE: 76 MS
QT - MUSE: 332 MS
QTC - MUSE: 453 MS
R AXIS - MUSE: 11 DEGREES
SODIUM SERPL-SCNC: 137 MMOL/L (ref 135–145)
SYSTOLIC BLOOD PRESSURE - MUSE: NORMAL MMHG
T AXIS - MUSE: 67 DEGREES
VENTRICULAR RATE- MUSE: 112 BPM

## 2025-02-17 PROCEDURE — 250N000013 HC RX MED GY IP 250 OP 250 PS 637: Performed by: INTERNAL MEDICINE

## 2025-02-17 PROCEDURE — 999N000157 HC STATISTIC RCP TIME EA 10 MIN

## 2025-02-17 PROCEDURE — 83735 ASSAY OF MAGNESIUM: CPT | Performed by: INTERNAL MEDICINE

## 2025-02-17 PROCEDURE — 120N000001 HC R&B MED SURG/OB

## 2025-02-17 PROCEDURE — 258N000003 HC RX IP 258 OP 636: Performed by: INTERNAL MEDICINE

## 2025-02-17 PROCEDURE — 99232 SBSQ HOSP IP/OBS MODERATE 35: CPT | Performed by: INTERNAL MEDICINE

## 2025-02-17 PROCEDURE — 84100 ASSAY OF PHOSPHORUS: CPT | Performed by: INTERNAL MEDICINE

## 2025-02-17 PROCEDURE — 36415 COLL VENOUS BLD VENIPUNCTURE: CPT | Performed by: INTERNAL MEDICINE

## 2025-02-17 PROCEDURE — 250N000012 HC RX MED GY IP 250 OP 636 PS 637: Performed by: INTERNAL MEDICINE

## 2025-02-17 PROCEDURE — 80048 BASIC METABOLIC PNL TOTAL CA: CPT | Performed by: INTERNAL MEDICINE

## 2025-02-17 PROCEDURE — 94640 AIRWAY INHALATION TREATMENT: CPT | Mod: 76

## 2025-02-17 PROCEDURE — 94640 AIRWAY INHALATION TREATMENT: CPT

## 2025-02-17 PROCEDURE — 250N000009 HC RX 250: Performed by: INTERNAL MEDICINE

## 2025-02-17 RX ORDER — PREDNISONE 20 MG/1
40 TABLET ORAL DAILY
Status: DISCONTINUED | OUTPATIENT
Start: 2025-02-17 | End: 2025-02-19 | Stop reason: HOSPADM

## 2025-02-17 RX ORDER — NALOXONE HYDROCHLORIDE 0.4 MG/ML
0.4 INJECTION, SOLUTION INTRAMUSCULAR; INTRAVENOUS; SUBCUTANEOUS
Status: DISCONTINUED | OUTPATIENT
Start: 2025-02-17 | End: 2025-02-19 | Stop reason: HOSPADM

## 2025-02-17 RX ORDER — NALOXONE HYDROCHLORIDE 0.4 MG/ML
0.2 INJECTION, SOLUTION INTRAMUSCULAR; INTRAVENOUS; SUBCUTANEOUS
Status: DISCONTINUED | OUTPATIENT
Start: 2025-02-17 | End: 2025-02-19 | Stop reason: HOSPADM

## 2025-02-17 RX ADMIN — LORAZEPAM 0.5 MG: 0.5 TABLET ORAL at 09:22

## 2025-02-17 RX ADMIN — OXYCODONE HYDROCHLORIDE 10 MG: 10 TABLET ORAL at 13:46

## 2025-02-17 RX ADMIN — AZITHROMYCIN DIHYDRATE 250 MG: 250 TABLET ORAL at 07:55

## 2025-02-17 RX ADMIN — PREDNISONE 40 MG: 20 TABLET ORAL at 11:07

## 2025-02-17 RX ADMIN — SODIUM CHLORIDE, POTASSIUM CHLORIDE, SODIUM LACTATE AND CALCIUM CHLORIDE: 600; 310; 30; 20 INJECTION, SOLUTION INTRAVENOUS at 00:43

## 2025-02-17 RX ADMIN — OXYCODONE HYDROCHLORIDE 10 MG: 10 TABLET ORAL at 07:55

## 2025-02-17 RX ADMIN — OXYCODONE HYDROCHLORIDE 10 MG: 10 TABLET ORAL at 20:40

## 2025-02-17 RX ADMIN — IPRATROPIUM BROMIDE AND ALBUTEROL SULFATE 3 ML: .5; 3 SOLUTION RESPIRATORY (INHALATION) at 16:36

## 2025-02-17 RX ADMIN — LEVOTHYROXINE SODIUM 100 MCG: 0.1 TABLET ORAL at 07:10

## 2025-02-17 RX ADMIN — IPRATROPIUM BROMIDE AND ALBUTEROL SULFATE 3 ML: .5; 3 SOLUTION RESPIRATORY (INHALATION) at 07:46

## 2025-02-17 RX ADMIN — IPRATROPIUM BROMIDE AND ALBUTEROL SULFATE 3 ML: .5; 3 SOLUTION RESPIRATORY (INHALATION) at 19:35

## 2025-02-17 RX ADMIN — CYCLOBENZAPRINE 10 MG: 10 TABLET, FILM COATED ORAL at 07:55

## 2025-02-17 RX ADMIN — IPRATROPIUM BROMIDE AND ALBUTEROL SULFATE 3 ML: .5; 3 SOLUTION RESPIRATORY (INHALATION) at 11:53

## 2025-02-17 ASSESSMENT — ACTIVITIES OF DAILY LIVING (ADL)
ADLS_ACUITY_SCORE: 59
ADLS_ACUITY_SCORE: 65
ADLS_ACUITY_SCORE: 59
ADLS_ACUITY_SCORE: 61
ADLS_ACUITY_SCORE: 65
ADLS_ACUITY_SCORE: 59
ADLS_ACUITY_SCORE: 59
ADLS_ACUITY_SCORE: 65
ADLS_ACUITY_SCORE: 59
ADLS_ACUITY_SCORE: 65
ADLS_ACUITY_SCORE: 59
ADLS_ACUITY_SCORE: 61
ADLS_ACUITY_SCORE: 61
ADLS_ACUITY_SCORE: 59
ADLS_ACUITY_SCORE: 59

## 2025-02-17 NOTE — CONSULTS
Patient has Medicare D through [employer].    Patient is will pay 100% of the first $590 in drug costs ($463 remains).  Prices below show cost until deductible is fulfilled, and subsequent fills.    Formulary is very narrow.  Only these drugs are covered in each class and indicated for asthma.     Deductible Subsequent fills   ICS     Arnuity: $198/mo. $42/mo.        LABA/ICS     Breyna: $213/mo. $45/mo.   Breo: $386/mo. $81/mo.     LAMA/LABA/ICS     Trelegy: $501. $135/mo.     If/when total out of pocket drug costs exceed $2000, patient will pay $0 for all covered drugs for the remainder of the year.    If patient cannot afford initial costs due to deductible, I recommend patient consider participating in the Medicare Prescription Payment Plan, which would spread drug costs more evenly throughout the year.  Patient may enroll in this program now, or at any time during 2025, by calling the member services phone number for their plan.      Cammie Ghotra  Pharmacy Technician/Liaison, Discharge Pharmacy   [phone] (voice or text)  [email or web address]  Pharmacy test claims are estimates and may not reflect final costs.  Suggested alternatives aim to be cost-effective and may not be therapeutically equivalent as this consult is informational and does not constitute medical advice.  Clinical decisions should be made by qualified healthcare providers.

## 2025-02-17 NOTE — PROGRESS NOTES
"    [facility]     Hospitalist Progress Note     Assessment & Plan     ASSESSMENT    54F with history of hypothyroidism, TBI, VINCE, PTSD, and uncontrolled diabetes mellitus presents with shortness of breath and found to have acute hypoxic respiratory failure secondary to asthma exacerbation.  Hospital course complicated by severe hyperglycemia due to uncontrolled diabetes and concurrent use of steroids.    Blood sugars improved although patient with ongoing wheezing, SOB, and O2 needs so will continue steroids today.  Expect blood sugars to be uncontrolled due to this and will need frequent adjustments of insulin.    PLAN    Acute Hypoxic Respiratory Failure  -Needing 2 L O2 to keep sats greater than 90%  -Suspected to be secondary to asthma exacerbation below  -Wean O2 as able    Asthma Exacerbation  -Prednisone 40 mg daily  -Scheduled and as needed nebulizers  -Azithromycin to 50 mg daily for 5 doses    Uncontrolled Diabetes Mellitus w/ Hyperglycemia  -Wildly uncontrolled diabetes with A1c 14.6 only on metformin  -Glucose uncontrolled this admission due to steroids  -HDSS, carb coverage, Lantus 40U bid, making frequent changes this adm  -Pharmacy consult for insulin teaching    Other Issues  -Hyponatremia: Now resolved s/p IVF  -Hypothyroidism: TSH 48 but has not been taking Synthroid.  Restart Synthroid  -Medical noncompliance: Counseled compliance  -TBI, VINCE, PTSD: Home medications    DVT Prophy  -SCDs    Disposition  -Medically Ready for Discharge: Anticipated Tomorrow       Derian Weaver MD    Subjective     Seen at bedside.  Patient intermittently needing oxygen overnight.  Wheezing this morning  In mild shortness of breath.        Objective   Blood pressure 133/85, pulse 92, temperature 97.8  F (36.6  C), temperature source Temporal, resp. rate 18, height 1.651 m (5' 5\"), weight 79.8 kg (175 lb 14.8 oz), SpO2 93%.    PHYSICAL EXAM  General: In no acute distress  CV: RRR.  Lungs: Scattered " wheezing. Nl WOB.  Abd: Non-tender.  Ext: No edema.    LABS AND IMAGING  Reviewed and pertinent results discussed in assessment and plan.

## 2025-02-17 NOTE — CONSULTS
"CLINICAL NUTRITION SERVICES - ASSESSMENT NOTE    Malnutrition Status:    % Intake: < 75% for > 7 days (moderate)  % Weight Loss: Unable to assess , see below (Sandra is not able to report numbers or a timeframe but reports wt loss)  Subcutaneous Fat Loss: None observed  Muscle Loss: None observed  Fluid Accumulation/Edema: None documented    Malnutrition Diagnosis: Patient does not meet two of the established criteria necessary for diagnosing malnutrition  Malnutrition Present on Admission: No     Registered Dietitian Interventions:  - Diet as ordered by provider.  - Offered oral supplements which were declined.         REASON FOR ASSESSMENT  Malnutrition screening tool (MST)     PMH of: Asthma, DMII, PTSD, TBI, chronic back pain.    Admit 2/2: Acute respiratory failure, asthma exacerbation, hyperglycemia.     SUBJECTIVE INFORMATION  Assessed patient in room.    NUTRITION HISTORY  - Diet at home: Number of meals daily varies greatly depending on the day, per description.  - Barriers to PO intakes: References having low appetite for ~1 week PTA and was consistently eating less than usual during that time.  - Use of oral supplements: None.  - Allergies: NKFA.    CURRENT NUTRITION ORDERS AND INTAKE/TOLERANCE  Diet: Mod CHO    % intakes acutely thus far.    Sandra tells me she is constipated and therefore has no appetite.      LABS  Nutrition-relevant labs: Reviewed.   Lab Results   Component Value Date    A1C 14.6 02/16/2025    A1C 6.5 08/21/2003    A1C 6.4 12/02/2002       MEDICATIONS  Nutrition-relevant medications: Reviewed.  - On Prednisone  - Insulin regimen noted    SYSTEM FINDINGS    - Skin: No current documentation of PI.   - GI symptoms: No recorded BM acutely.   - Currently requiring 4 L NC.      ANTHROPOMETRICS  Height: 165.1 cm (5' 5\")  Most Recent Weight: 79.8 kg (175 lb 14.8 oz)  Body mass index is 29.28 kg/m .:   Overweight BMI 25-29.9  Wt Readings from Last 10 Encounters:   02/16/25 79.8 kg " "(175 lb 14.8 oz)   06/23/23 83.9 kg (185 lb)     - No edema documented.   - Describes wt loss but not able to describe numbers in terms of scale wt or a timeframe.  Knows she has lost from a pant size of 18-20 to ~12 but again not sure of the timeframe.     ASSESSED NUTRITION NEEDS  Dosing Weight: 80 kg, based on admit/bed scale wt  Estimated Energy Needs: >/=20 kcal/kg  Justification: Maintenance  Estimated Protein Needs: >/=1 grams of pro/kg  Justification: Preservation of lean body mass  Estimated Fluid Needs: per provider    NUTRITION DIAGNOSIS  Inadequate oral intake related to report of acutely decreased appetite and as evidenced by meeting <75% of nutrition needs over the past ~week.      INTERVENTIONS  Nutrition education content     Goals  PO intakes of at least 50-75% of meals or snacks TID while acutely admitted.     Monitoring/Evaluation  Progress toward goals will be monitored and evaluated per policy.      Shannon Sorto RDN, , LD  Clinical Dietitian  [facility] Message Group: \"Dietitian [[place]]\"  Office: [phone]  Pagers:  3rd floor/ICU: [phone]  All other floors: [phone]  Weekend/holiday: [phone]    "

## 2025-02-17 NOTE — PROVIDER NOTIFICATION
02/16/25 2059   RCAT Assessment   Reason for Assessment Asthma   Pulmonary Status 3   Surgical Status 0   Chest X-ray 0   Respiratory Pattern 2   Mental Status 0   Breath Sounds 3   Cough Effectiveness 0   Level of Activity 1   O2 Required for SpO2>=92% 2   Acuity Level (points) 11   Acuity Level  3   Re-eval Interval Guideline Every 3 days   Re-evaluation Date 02/19/25   Clinical Indications/Symptoms   Aerosol Therapy RCAT protocol   Broncho-pulmonary Hygiene History of mucous producing disease   Volume Expansion Prevent atelectasis   Aerosol Therapy Plan   RT Treatment Nebulizer   Anticholinergic/Beta-Andrenergic Agonist Duoneb soln (0.5mg/3mg per 3mL) neb Max 6 doses/24h   Aerosol Treatment Frequency Acuity Level 3: QID/PRN @noc-Mod wheezing/Hx asthma/secretion removal   Aerosol Therapy (SVN)   Respiratory Treatment Status (SVN) given   Patient Position HOB elevated   Posttreatment Assessment (SVN) increased aeration   Signs of Intolerance (SVN) none   Broncho-Pulmonary Hygiene Plan   Broncho-Pulmonary Hygiene Treatment Coughing techniques   Broncho-Pulm Hygiene Frequency Acuity Level 3: TID-Small amounts secretions/poor cough, hx of secretions   Volume Expansion Plan   Volume Expansion Treatment Incentive Spirometer   Volume Expansion Frequency Acuity Level 3: TID-At risk for developing atelectasis   Breath Sounds   Breath Sounds All Fields   All Lung Fields Breath Sounds diminished;crackles     Leni Mckinnon, RT

## 2025-02-17 NOTE — PLAN OF CARE
"Goal Outcome Evaluation:      Plan of Care Reviewed With: patient  /74 (BP Location: Left arm)   Pulse 86   Temp (!) 96.4  F (35.8  C) (Temporal)   Resp 18   Ht 1.651 m (5' 5\")   Wt 79.8 kg (175 lb 14.8 oz)   SpO2 93%   BMI 29.28 kg/m     Problem: Adult Inpatient Plan of Care  Goal: Plan of Care Review  Description: The Plan of Care Review/Shift note should be completed every shift.  The Outcome Evaluation is a brief statement about your assessment that the patient is improving, declining, or no change.  This information will be displayed automatically on your shift  note.  Outcome: Not Progressing  Flowsheets (Taken 2/16/2025 1830)  Outcome Evaluation: Pt A&O, for mobility use cane and one assist, having back pain, PRN oxycoden was given pt is on continuing fluids 125 ml/hr, on BG check 370, covered with insuline, on 3 l oxygen, voided adeqautely, CMS intact, makes needs known.  Plan of Care Reviewed With: patient  Goal: Patient-Specific Goal (Individualized)  Description: You can add care plan individualizations to a care plan. Examples of Individualization might be:  \"Parent requests to be called daily at 9am for status\", \"I have a hard time hearing out of my right ear\", or \"Do not touch me to wake me up as it startles  me\".  Outcome: Not Progressing  Goal: Absence of Hospital-Acquired Illness or Injury  Outcome: Not Progressing  Intervention: Identify and Manage Fall Risk  Recent Flowsheet Documentation  Taken 2/16/2025 1821 by Michelle Bonner RN  Safety Promotion/Fall Prevention:   activity supervised   assistive device/personal items within reach   clutter free environment maintained   nonskid shoes/slippers when out of bed   room organization consistent   safety round/check completed  Intervention: Prevent Skin Injury  Recent Flowsheet Documentation  Taken 2/16/2025 1821 by Michelle Bonner RN  Body Position: position changed independently  Taken 2/16/2025 1800 by Michelle Bonner, " RN  Body Position: position changed independently  Goal: Optimal Comfort and Wellbeing  Outcome: Not Progressing  Intervention: Monitor Pain and Promote Comfort  Recent Flowsheet Documentation  Taken 2/16/2025 1800 by Michelle Bonner RN  Pain Management Interventions: medication (see MAR)  Goal: Readiness for Transition of Care  Outcome: Not Progressing     Problem: Infection  Goal: Absence of Infection Signs and Symptoms  Outcome: Not Progressing     Problem: Glycemic Control Impaired  Goal: Blood Glucose Level Within Targeted Range  Outcome: Not Progressing  Goal: Minimize Risk of Hypoglycemia  Outcome: Not Progressing           Outcome Evaluation: Pt A&O, for mobility use cane and one assist, having back pain, PRN oxycoden was given pt is on continuing fluids 125 ml/hr, on BG check 370, covered with insuline, on 3 l oxygen, voided adeqautely, CMS intact, makes needs known.

## 2025-02-17 NOTE — PLAN OF CARE
"Goal Outcome Evaluation:  RN [id number]    Plan of Care Reviewed With: patient    Overall Patient Progress: improvingOverall Patient Progress: improving    Outcome Evaluation: 3L NC needed; A/ox4; Ax1 cane; oxy and flexeril given x2; BGL improving and decreasing; voding, ambulating to bathroom; plan TBD, prednisone PO;       Problem: Adult Inpatient Plan of Care  Goal: Plan of Care Review  Description: The Plan of Care Review/Shift note should be completed every shift.  The Outcome Evaluation is a brief statement about your assessment that the patient is improving, declining, or no change.  This information will be displayed automatically on your shift  note.  Outcome: Progressing  Flowsheets (Taken 2/17/2025 0654)  Outcome Evaluation:   3L NC needed   A/ox4   Ax1 cane   oxy and flexeril given x1   IVF@125   BGL improving and decreasing   voding, ambulating to bathroom   plan TBD  Plan of Care Reviewed With: patient  Overall Patient Progress: improving  Goal: Patient-Specific Goal (Individualized)  Description: You can add care plan individualizations to a care plan. Examples of Individualization might be:  \"Parent requests to be called daily at 9am for status\", \"I have a hard time hearing out of my right ear\", or \"Do not touch me to wake me up as it startles  me\".  Outcome: Progressing  Goal: Absence of Hospital-Acquired Illness or Injury  Outcome: Progressing  Intervention: Identify and Manage Fall Risk  Recent Flowsheet Documentation  Taken 2/16/2025 2036 by Laurie Elliott, RN  Safety Promotion/Fall Prevention:   activity supervised   assistive device/personal items within reach   clutter free environment maintained   nonskid shoes/slippers when out of bed   room organization consistent   safety round/check completed   lighting adjusted  Intervention: Prevent Skin Injury  Recent Flowsheet Documentation  Taken 2/16/2025 2036 by Laurie Elliott, RN  Body Position: position changed independently  Intervention: Prevent " Infection  Recent Flowsheet Documentation  Taken 2/16/2025 2036 by Laurie Elliott, RN  Infection Prevention:   single patient room provided   rest/sleep promoted  Goal: Optimal Comfort and Wellbeing  Outcome: Progressing  Intervention: Monitor Pain and Promote Comfort  Recent Flowsheet Documentation  Taken 2/16/2025 2036 by Laurie Elliott, RN  Pain Management Interventions: medication (see MAR)  Goal: Readiness for Transition of Care  Outcome: Progressing     Problem: Infection  Goal: Absence of Infection Signs and Symptoms  Outcome: Progressing     Problem: Glycemic Control Impaired  Goal: Blood Glucose Level Within Targeted Range  Outcome: Progressing  Goal: Minimize Risk of Hypoglycemia  Outcome: Progressing

## 2025-02-18 LAB
GLUCOSE BLDC GLUCOMTR-MCNC: 126 MG/DL (ref 70–99)
GLUCOSE BLDC GLUCOMTR-MCNC: 137 MG/DL (ref 70–99)
GLUCOSE BLDC GLUCOMTR-MCNC: 238 MG/DL (ref 70–99)
GLUCOSE BLDC GLUCOMTR-MCNC: 340 MG/DL (ref 70–99)
GLUCOSE BLDC GLUCOMTR-MCNC: 342 MG/DL (ref 70–99)

## 2025-02-18 PROCEDURE — 99232 SBSQ HOSP IP/OBS MODERATE 35: CPT | Performed by: HOSPITALIST

## 2025-02-18 PROCEDURE — 250N000009 HC RX 250: Performed by: INTERNAL MEDICINE

## 2025-02-18 PROCEDURE — 250N000013 HC RX MED GY IP 250 OP 250 PS 637: Performed by: INTERNAL MEDICINE

## 2025-02-18 PROCEDURE — 94640 AIRWAY INHALATION TREATMENT: CPT

## 2025-02-18 PROCEDURE — 120N000001 HC R&B MED SURG/OB

## 2025-02-18 PROCEDURE — 999N000157 HC STATISTIC RCP TIME EA 10 MIN

## 2025-02-18 PROCEDURE — 94640 AIRWAY INHALATION TREATMENT: CPT | Mod: 76

## 2025-02-18 PROCEDURE — 250N000012 HC RX MED GY IP 250 OP 636 PS 637: Performed by: INTERNAL MEDICINE

## 2025-02-18 RX ORDER — ALBUTEROL SULFATE 90 UG/1
1-2 INHALANT RESPIRATORY (INHALATION) EVERY 6 HOURS PRN
Qty: 8.5 G | Refills: 0 | Status: SHIPPED | OUTPATIENT
Start: 2025-02-18

## 2025-02-18 RX ORDER — AZITHROMYCIN 250 MG/1
250 TABLET, FILM COATED ORAL DAILY
Qty: 2 TABLET | Refills: 0 | Status: SHIPPED | OUTPATIENT
Start: 2025-02-19

## 2025-02-18 RX ORDER — ALBUTEROL SULFATE 90 UG/1
2 INHALANT RESPIRATORY (INHALATION) EVERY 6 HOURS PRN
Qty: 8.5 G | Refills: 0 | Status: SHIPPED | OUTPATIENT
Start: 2025-02-18

## 2025-02-18 RX ORDER — GUAIFENESIN 200 MG/10ML
200 LIQUID ORAL EVERY 4 HOURS PRN
Qty: 180 ML | Refills: 0 | Status: SHIPPED | OUTPATIENT
Start: 2025-02-18

## 2025-02-18 RX ORDER — PREDNISONE 20 MG/1
40 TABLET ORAL DAILY
Qty: 2 TABLET | Refills: 0 | Status: SHIPPED | OUTPATIENT
Start: 2025-02-19

## 2025-02-18 RX ORDER — INSULIN GLARGINE-YFGN 100 [IU]/ML
40 INJECTION, SOLUTION SUBCUTANEOUS DAILY
Qty: 15 ML | Refills: 0 | Status: SHIPPED | OUTPATIENT
Start: 2025-02-18

## 2025-02-18 RX ADMIN — LORAZEPAM 0.5 MG: 0.5 TABLET ORAL at 10:34

## 2025-02-18 RX ADMIN — ACETAMINOPHEN 650 MG: 325 TABLET, FILM COATED ORAL at 09:04

## 2025-02-18 RX ADMIN — IPRATROPIUM BROMIDE AND ALBUTEROL SULFATE 3 ML: .5; 3 SOLUTION RESPIRATORY (INHALATION) at 11:29

## 2025-02-18 RX ADMIN — OXYCODONE HYDROCHLORIDE 10 MG: 10 TABLET ORAL at 01:04

## 2025-02-18 RX ADMIN — IPRATROPIUM BROMIDE AND ALBUTEROL SULFATE 3 ML: .5; 3 SOLUTION RESPIRATORY (INHALATION) at 07:37

## 2025-02-18 RX ADMIN — LEVOTHYROXINE SODIUM 100 MCG: 0.1 TABLET ORAL at 06:35

## 2025-02-18 RX ADMIN — OXYCODONE HYDROCHLORIDE 10 MG: 10 TABLET ORAL at 06:35

## 2025-02-18 RX ADMIN — OXYCODONE HYDROCHLORIDE 10 MG: 10 TABLET ORAL at 22:44

## 2025-02-18 RX ADMIN — AZITHROMYCIN DIHYDRATE 250 MG: 250 TABLET ORAL at 08:51

## 2025-02-18 RX ADMIN — ALBUTEROL SULFATE 2 PUFF: 108 INHALANT RESPIRATORY (INHALATION) at 17:49

## 2025-02-18 RX ADMIN — OXYCODONE HYDROCHLORIDE 10 MG: 10 TABLET ORAL at 16:09

## 2025-02-18 RX ADMIN — ACETAMINOPHEN 650 MG: 325 TABLET, FILM COATED ORAL at 01:05

## 2025-02-18 RX ADMIN — CYCLOBENZAPRINE 10 MG: 10 TABLET, FILM COATED ORAL at 17:51

## 2025-02-18 RX ADMIN — CYCLOBENZAPRINE 10 MG: 10 TABLET, FILM COATED ORAL at 09:03

## 2025-02-18 RX ADMIN — ACETAMINOPHEN 650 MG: 325 TABLET, FILM COATED ORAL at 22:44

## 2025-02-18 RX ADMIN — CYCLOBENZAPRINE 10 MG: 10 TABLET, FILM COATED ORAL at 01:05

## 2025-02-18 RX ADMIN — OXYCODONE HYDROCHLORIDE 10 MG: 10 TABLET ORAL at 10:31

## 2025-02-18 RX ADMIN — ACETAMINOPHEN 650 MG: 325 TABLET, FILM COATED ORAL at 16:09

## 2025-02-18 RX ADMIN — IPRATROPIUM BROMIDE AND ALBUTEROL SULFATE 3 ML: .5; 3 SOLUTION RESPIRATORY (INHALATION) at 19:52

## 2025-02-18 RX ADMIN — LORAZEPAM 0.5 MG: 0.5 TABLET ORAL at 01:05

## 2025-02-18 RX ADMIN — IPRATROPIUM BROMIDE AND ALBUTEROL SULFATE 3 ML: .5; 3 SOLUTION RESPIRATORY (INHALATION) at 16:30

## 2025-02-18 RX ADMIN — PREDNISONE 40 MG: 20 TABLET ORAL at 08:51

## 2025-02-18 ASSESSMENT — ACTIVITIES OF DAILY LIVING (ADL)
ADLS_ACUITY_SCORE: 65

## 2025-02-18 NOTE — PHARMACY
"Pharmacy: Diabetes education in new DM patient - will focus on \"survival skills\"    Pt will take metformin with meals to minimize GI side effects  Current A1C 14.6. Goal A1C <7  Pt will demonstrate ability to correctly calculate, prepare & self-administer insulin  Pt will check BG three times daily before meals and at bedtime and will share with primary care physician  Reminded pt that illness/sickness could worsen blood glucose.   Pt knows how to recognize and treat signs of hypoglycemia    IMPORTANT FOLLOW UP NOTES:   -Pt will follow up with PCP for enhancement of DM regimen.       "

## 2025-02-18 NOTE — CONSULTS
"CGM (Freestyle Carole or Dexcom) is covered copay under the patient's Medicare B benefit when the following criteria is met and documented in a note by the prescribing physician (use shortcode \"cgmcriteria\"):     I have evaluated this patient in-person today and I am prescribing continuous glucose monitor for the following reasons:     -The patient has a diagnosis of diabetes mellitus.  -The patient has been using a blood glucose monitor to test 4+ times a day.  -The patient is insulin-treated with three or more daily injections of insulin (basal and rapid acting).  -The patient's insulin treatment regimen requires frequent adjustments due sliding scale, carb counting, and/or labile blood sugars.    Freestyle Carole 3    [place]: $53   Sensors: $53/mo        Dexcom G7    [place]: $53   Sensors:  $53/mo      Cammie Ghotra  Pharmacy Technician/Liaison, Discharge Pharmacy   [phone] (voice or text)  [email or web address]  Pharmacy test claims are estimates and may not reflect final costs.  Suggested alternatives aim to be cost-effective and may not be therapeutically equivalent as this consult is informational and does not constitute medical advice.  Clinical decisions should be made by qualified healthcare providers.    "
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
## 2025-02-19 VITALS
RESPIRATION RATE: 20 BRPM | TEMPERATURE: 98.6 F | BODY MASS INDEX: 29.31 KG/M2 | OXYGEN SATURATION: 95 % | WEIGHT: 175.93 LBS | HEIGHT: 65 IN | HEART RATE: 120 BPM | DIASTOLIC BLOOD PRESSURE: 81 MMHG | SYSTOLIC BLOOD PRESSURE: 144 MMHG

## 2025-02-19 PROBLEM — G89.21 CHRONIC PAIN DUE TO TRAUMA: Status: ACTIVE | Noted: 2025-02-19

## 2025-02-19 PROBLEM — F41.1 GAD (GENERALIZED ANXIETY DISORDER): Status: ACTIVE | Noted: 2025-02-19

## 2025-02-19 PROBLEM — J96.01 ACUTE RESPIRATORY FAILURE WITH HYPOXIA (H): Status: ACTIVE | Noted: 2025-02-19

## 2025-02-19 PROBLEM — E11.9 TYPE 2 DIABETES MELLITUS WITHOUT COMPLICATION, WITHOUT LONG-TERM CURRENT USE OF INSULIN (H): Status: ACTIVE | Noted: 2025-02-19

## 2025-02-19 PROBLEM — T38.0X5A STEROID-INDUCED HYPERGLYCEMIA: Status: ACTIVE | Noted: 2025-02-16

## 2025-02-19 LAB
GLUCOSE BLDC GLUCOMTR-MCNC: 126 MG/DL (ref 70–99)
GLUCOSE BLDC GLUCOMTR-MCNC: 148 MG/DL (ref 70–99)
GLUCOSE BLDC GLUCOMTR-MCNC: 258 MG/DL (ref 70–99)

## 2025-02-19 PROCEDURE — 94640 AIRWAY INHALATION TREATMENT: CPT

## 2025-02-19 PROCEDURE — 250N000009 HC RX 250: Performed by: INTERNAL MEDICINE

## 2025-02-19 PROCEDURE — 250N000012 HC RX MED GY IP 250 OP 636 PS 637: Performed by: INTERNAL MEDICINE

## 2025-02-19 PROCEDURE — 250N000013 HC RX MED GY IP 250 OP 250 PS 637: Performed by: INTERNAL MEDICINE

## 2025-02-19 PROCEDURE — 999N000157 HC STATISTIC RCP TIME EA 10 MIN

## 2025-02-19 PROCEDURE — 94640 AIRWAY INHALATION TREATMENT: CPT | Mod: 76

## 2025-02-19 RX ORDER — PROCHLORPERAZINE 25 MG/1
SUPPOSITORY RECTAL
Qty: 3 EACH | Refills: 5 | Status: SHIPPED | OUTPATIENT
Start: 2025-02-19

## 2025-02-19 RX ORDER — PROCHLORPERAZINE 25 MG/1
SUPPOSITORY RECTAL
Qty: 1 EACH | Refills: 1 | Status: SHIPPED | OUTPATIENT
Start: 2025-02-19

## 2025-02-19 RX ORDER — PROCHLORPERAZINE 25 MG/1
SUPPOSITORY RECTAL
Qty: 1 EACH | Refills: 0 | Status: SHIPPED | OUTPATIENT
Start: 2025-02-19

## 2025-02-19 RX ADMIN — CYCLOBENZAPRINE 10 MG: 10 TABLET, FILM COATED ORAL at 02:34

## 2025-02-19 RX ADMIN — ALBUTEROL SULFATE 2 PUFF: 108 INHALANT RESPIRATORY (INHALATION) at 08:26

## 2025-02-19 RX ADMIN — OXYCODONE HYDROCHLORIDE 10 MG: 10 TABLET ORAL at 14:27

## 2025-02-19 RX ADMIN — OXYCODONE HYDROCHLORIDE 10 MG: 10 TABLET ORAL at 08:26

## 2025-02-19 RX ADMIN — ACETAMINOPHEN 650 MG: 325 TABLET, FILM COATED ORAL at 08:26

## 2025-02-19 RX ADMIN — LEVOTHYROXINE SODIUM 100 MCG: 0.1 TABLET ORAL at 08:08

## 2025-02-19 RX ADMIN — IPRATROPIUM BROMIDE AND ALBUTEROL SULFATE 3 ML: .5; 3 SOLUTION RESPIRATORY (INHALATION) at 08:03

## 2025-02-19 RX ADMIN — ACETAMINOPHEN 650 MG: 325 TABLET, FILM COATED ORAL at 14:26

## 2025-02-19 RX ADMIN — AZITHROMYCIN DIHYDRATE 250 MG: 250 TABLET ORAL at 08:08

## 2025-02-19 RX ADMIN — IPRATROPIUM BROMIDE AND ALBUTEROL SULFATE 3 ML: .5; 3 SOLUTION RESPIRATORY (INHALATION) at 15:39

## 2025-02-19 RX ADMIN — CYCLOBENZAPRINE 10 MG: 10 TABLET, FILM COATED ORAL at 11:51

## 2025-02-19 RX ADMIN — PREDNISONE 40 MG: 20 TABLET ORAL at 08:08

## 2025-02-19 RX ADMIN — ALBUTEROL SULFATE 2 PUFF: 108 INHALANT RESPIRATORY (INHALATION) at 12:01

## 2025-02-19 ASSESSMENT — ACTIVITIES OF DAILY LIVING (ADL)
ADLS_ACUITY_SCORE: 65
ADLS_ACUITY_SCORE: 65
DEPENDENT_IADLS:: INDEPENDENT
ADLS_ACUITY_SCORE: 65
ADLS_ACUITY_SCORE: 65
ADLS_ACUITY_SCORE: 63
ADLS_ACUITY_SCORE: 65
ADLS_ACUITY_SCORE: 63
ADLS_ACUITY_SCORE: 65
ADLS_ACUITY_SCORE: 63
ADLS_ACUITY_SCORE: 65
ADLS_ACUITY_SCORE: 63
ADLS_ACUITY_SCORE: 65

## 2025-02-19 NOTE — PLAN OF CARE
"Goal Outcome Evaluation:      Plan of Care Reviewed With: patient          Outcome Evaluation: VSS. AXO. Reported pain and managed with oxy, tylenol, and flexeril. Blood sugars were 258 and 342 and managed with insulin. On 1.5 L of oxygen via nasal cannula.            Problem: Adult Inpatient Plan of Care  Goal: Plan of Care Review  Description: The Plan of Care Review/Shift note should be completed every shift.  The Outcome Evaluation is a brief statement about your assessment that the patient is improving, declining, or no change.  This information will be displayed automatically on your shift  note.  Outcome: Progressing  Flowsheets (Taken 2/19/2025 0725)  Outcome Evaluation: VSS. AXO. Reported pain and managed with oxy, tylenol, and flexeril. Blood sugars were 258 and 342 and managed with insulin. On 1.5 L of oxygen via nasal cannula.  Plan of Care Reviewed With: patient  Goal: Patient-Specific Goal (Individualized)  Description: You can add care plan individualizations to a care plan. Examples of Individualization might be:  \"Parent requests to be called daily at 9am for status\", \"I have a hard time hearing out of my right ear\", or \"Do not touch me to wake me up as it startles  me\".  Outcome: Progressing  Goal: Absence of Hospital-Acquired Illness or Injury  Outcome: Progressing  Intervention: Identify and Manage Fall Risk  Recent Flowsheet Documentation  Taken 2/18/2025 2100 by Franny Erwin RN  Safety Promotion/Fall Prevention: safety round/check completed  Intervention: Prevent and Manage VTE (Venous Thromboembolism) Risk  Recent Flowsheet Documentation  Taken 2/18/2025 2100 by Franny Erwin RN  VTE Prevention/Management: SCDs off (sequential compression devices)  Intervention: Prevent Infection  Recent Flowsheet Documentation  Taken 2/18/2025 2100 by Franny Erwin RN  Infection Prevention: rest/sleep promoted  Goal: Optimal Comfort and Wellbeing  Outcome: Progressing  Goal: Readiness for " Transition of Care  Outcome: Progressing     Problem: Infection  Goal: Absence of Infection Signs and Symptoms  Outcome: Progressing     Problem: Glycemic Control Impaired  Goal: Blood Glucose Level Within Targeted Range  Outcome: Progressing  Goal: Minimize Risk of Hypoglycemia  Outcome: Progressing
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
## 2025-02-19 NOTE — PROGRESS NOTES
Patient has been assessed for Home Oxygen needs. Oxygen readings:    *Pulse oximetry (SpO2) = 91% on room air at rest while awake.    *SpO2 improved to na% on 0liters/minute at rest.    *SpO2 = 94% on room air during activity/with exercise.    *SpO2 improved to na% on 0liters/minute during activity/with exercise.     Tachycardic with ambulation -   Respiration - 28
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
## 2025-02-19 NOTE — PLAN OF CARE
Goal Outcome Evaluation:                      Discharge Note    Patient discharged to home via taxi accompanied by no family/friend .  IV: Discontinued  Prescriptions filled and given to patient/family.   Belongings reviewed and sent with patient.   Home medications returned to patient: NA  Equipment sent with: patient.   patient verbalizes understanding of discharge instructions. AVS given to patient.  Additional education completed? Diabetes Education     Dexcom application declined by pt, stated significant other has similar device and will set up at home

## 2025-02-19 NOTE — DISCHARGE SUMMARY
[facility] Discharge Summary    Sandra Lilly MRN# [id number]   Age: 54 year old YOB: 1970     Date of Admission:  2/16/2025  Date of Discharge::  2/19/2025  Admitting Physician:  Jase Canales MD  Discharge Physician:  Baltazar Weiss MD     Home clinic: ***          Admission Diagnoses:   Hyperglycemia [R73.9]  Mild asthma with exacerbation, unspecified whether persistent [J45.901]          Discharge Diagnosis:     Principal Problem:    Acute respiratory failure with hypoxia (H)  Active Problems:    PTSD (post-traumatic stress disorder)    Steroid-induced hyperglycemia    Mild asthma with exacerbation, unspecified whether persistent    Type 2 diabetes mellitus without complication, without long-term current use of insulin (H)    VINCE (generalized anxiety disorder)    Chronic pain due to trauma           Procedures:   ***          Medications Prior to Admission:     Medications Prior to Admission   Medication Sig Dispense Refill Last Dose/Taking    cyclobenzaprine (FLEXERIL) 10 MG tablet Take 10 mg by mouth 3 times daily.   2/15/2025 Evening    levothyroxine (SYNTHROID/LEVOTHROID) 100 MCG tablet Take 1 tablet by mouth daily.   Unknown    metFORMIN (GLUCOPHAGE XR) 500 MG 24 hr tablet Take 1,000 mg by mouth daily.   2/15/2025    oxyCODONE IR (ROXICODONE) 10 MG tablet Take 10 mg by mouth every 4 hours as needed for severe pain.   Unknown    rizatriptan (MAXALT) 10 MG tablet Take 10 mg by mouth at onset of headache for migraine.   Unknown    [DISCONTINUED] albuterol (PROAIR HFA/PROVENTIL HFA/VENTOLIN HFA) 108 (90 Base) MCG/ACT inhaler Inhale 2 puffs into the lungs every 6 hours as needed for shortness of breath or wheezing.   Unknown             Discharge Medications:     Current Discharge Medication List        START taking these medications    Details   azithromycin (ZITHROMAX) 250 MG tablet Take 1 tablet (250 mg) by mouth daily.  Qty: 2 tablet, Refills: 0    Associated  Diagnoses: Mild asthma with exacerbation, unspecified whether persistent      blood glucose monitoring (NO BRAND SPECIFIED) meter device kit Use to test blood sugar four times per day, before each meal and at bedtime times daily or as directed.  Qty: 1 kit, Refills: 0    Associated Diagnoses: Hyperglycemia      Continuous Glucose  (DEXCOM G6 ) RACQUEL Use to read blood sugars as per 's instructions.  Qty: 1 each, Refills: 0    Associated Diagnoses: Hyperglycemia; Type 2 diabetes mellitus without complication, without long-term current use of insulin (H)      Continuous Glucose Sensor (DEXCOM G6 SENSOR) MISC Change every 10 days.  Qty: 3 each, Refills: 5    Associated Diagnoses: Hyperglycemia; Type 2 diabetes mellitus without complication, without long-term current use of insulin (H)      Continuous Glucose Transmitter (DEXCOM G6 TRANSMITTER) MISC Change every 3 months.  Qty: 1 each, Refills: 1    Associated Diagnoses: Hyperglycemia; Type 2 diabetes mellitus without complication, without long-term current use of insulin (H)      fluticasone (ARNUITY ELLIPTA) 100 MCG/ACT inhaler Inhale 1 puff into the lungs daily.  Qty: 1 each, Refills: 0    Associated Diagnoses: Mild asthma with exacerbation, unspecified whether persistent      guaiFENesin (ROBITUSSIN) 20 mg/mL liquid Take 10 mLs (200 mg) by mouth every 4 hours as needed for cough.  Qty: 180 mL, Refills: 0    Associated Diagnoses: Mild asthma with exacerbation, unspecified whether persistent      insulin aspart (NOVOLOG PEN) 100 UNIT/ML pen Inject 10 Units subcutaneously 3 times daily (with meals).  Qty: 15 mL, Refills: 0    Comments: Do not give if your sugar is less than 150 at your meal time.  Associated Diagnoses: Hyperglycemia      Insulin Glargine-yfgn 100 UNIT/ML SOPN Inject 40 Units subcutaneously daily.  Qty: 15 mL, Refills: 0    Associated Diagnoses: Hyperglycemia      predniSONE (DELTASONE) 20 MG tablet Take 2 tablets (40 mg) by  mouth daily.  Qty: 2 tablet, Refills: 0    Associated Diagnoses: Mild asthma with exacerbation, unspecified whether persistent           CONTINUE these medications which have CHANGED    Details   !! albuterol (PROAIR HFA/PROVENTIL HFA/VENTOLIN HFA) 108 (90 Base) MCG/ACT inhaler Inhale 1-2 puffs into the lungs every 6 hours as needed for shortness of breath, wheezing or cough.  Qty: 8.5 g, Refills: 0    Comments: Pharmacy may dispense brand covered by insurance (Proair, or proventil or ventolin or generic albuterol inhaler)  Associated Diagnoses: Mild asthma with exacerbation, unspecified whether persistent      !! albuterol (PROAIR HFA/PROVENTIL HFA/VENTOLIN HFA) 108 (90 Base) MCG/ACT inhaler Inhale 2 puffs into the lungs every 6 hours as needed for shortness of breath, wheezing or cough.  Qty: 8.5 g, Refills: 0    Comments: Pharmacy may dispense brand covered by insurance (Proair, or proventil or ventolin or generic albuterol inhaler)  Associated Diagnoses: Mild asthma with exacerbation, unspecified whether persistent       !! - Potential duplicate medications found. Please discuss with provider.        CONTINUE these medications which have NOT CHANGED    Details   cyclobenzaprine (FLEXERIL) 10 MG tablet Take 10 mg by mouth 3 times daily.      levothyroxine (SYNTHROID/LEVOTHROID) 100 MCG tablet Take 1 tablet by mouth daily.      metFORMIN (GLUCOPHAGE XR) 500 MG 24 hr tablet Take 1,000 mg by mouth daily.      oxyCODONE IR (ROXICODONE) 10 MG tablet Take 10 mg by mouth every 4 hours as needed for severe pain.      rizatriptan (MAXALT) 10 MG tablet Take 10 mg by mouth at onset of headache for migraine.                   Consultations:   No consultations were requested during this admission          Brief History of Illness:   Sandra Lilly 54F with  presents with shortness of breath and found to have acute hypoxic respiratory failure secondary to asthma exacerbation.  Hospital course complicated by severe  "hyperglycemia due to uncontrolled diabetes and concurrent use of steroids.     PMH is notable for hypothyroidism and uncontrolled diabetes mellitus.  Her medical management is made more complex by TBI, VINCE, PTSD.           Hospital Course:   {                  :[id number]}***    As a result of exposure to steroids, Ms. Lilly glucose control was worse than usual. Her HbA1c on admission was > 14, indicating inadequate control, and the steroids exacerbated this even with the addition of Long-acting Insulin, meal time correction and carb counting. At the time of discharge, the pt, who needs to start back on Insulin to control her diabetes, will be given a Continuous Glucose Monitor in order to help her monitor her sugars. Her diabetic management, which is made more difficult by hx TBI, make the need for a \"smart device\" to help with notifying her of significantly rising or falling glucose trends.           Discharge Instructions and Follow-Up:     Discharge diet: Regular   Discharge activity: Activity as tolerated   Discharge follow-up: Follow up with primary care provider as soon as can be arranged.            Discharge Disposition:     Discharged to home      Attestation:  I have reviewed today's vital signs, notes, medications, labs and imaging.    Baltazar Weiss MD     " "U per meal, with the goal of uptitrating as needed once she is off the steroids  -Pharmacy consult for insulin teaching     Other Issues  -Hyponatremia: Now resolved s/p IVF  -Hypothyroidism: TSH 48 but has not been taking Synthroid.  Restart Synthroid  -Medical noncompliance: Counseled compliance  -TBI, VINCE, PTSD: Home medications    As a result of exposure to steroids, Ms. Lilly glucose control was worse than usual. Her HbA1c on admission was > 14, indicating inadequate control, and the steroids exacerbated this even with the addition of Long-acting Insulin, meal time correction and carb counting. At the time of discharge, the pt, who needs to start back on Insulin to control her diabetes, will be given a Continuous Glucose Monitor in order to help her monitor her sugars. Her diabetic management, which is made more difficult by hx TBI, make the need for a \"smart device\" to help with notifying her of significantly rising or falling glucose trends.     BP (!) 144/81 (BP Location: Left arm)   Pulse 120   Temp 98.6  F (37  C) (Temporal)   Resp 20   Ht 1.651 m (5' 5\")   Wt 79.8 kg (175 lb 14.8 oz)   SpO2 95%   BMI 29.28 kg/m    On the date of discharge, Ms. Lilly is alert, coherent and in NAD.   HEENT: No focal muscular asymmetry  Chest: scattered exp wheeze, no incrased WOB  COR: RRR without murmur  Abd: Soft, NTND          Discharge Instructions and Follow-Up:     Discharge diet: Regular   Discharge activity: Activity as tolerated   Discharge follow-up: Follow up with primary care provider as soon as can be arranged.            Discharge Disposition:     Discharged to home      Attestation:  I have reviewed today's vital signs, notes, medications, labs and imaging.    Baltazar Wiess MD     "

## 2025-02-19 NOTE — PLAN OF CARE
Discharge paperwork given with all the new medication education and hyper/hypoglycemia information. AVS updated and reprinted without the education attached as pt already had medication education information in hand

## 2025-02-19 NOTE — PLAN OF CARE
"Pt A&O x4. Attempted to wean O2-but still requiring 1.5 L O2 @ times. PRN albuterol inhaler given x1. PO oxycodone, flexeril, and tylenol given for pain. PO ativan given x1 for anxiety. CMS intact. Up w/ SBA using gait belt, and cane. Voiding. Tolerating CHO diet. Educated started on use of Insulin pen-pt able to demonstrate usage. Plan is for possible discharge to home tomorrow.     Problem: Adult Inpatient Plan of Care  Goal: Plan of Care Review  Description: The Plan of Care Review/Shift note should be completed every shift.  The Outcome Evaluation is a brief statement about your assessment that the patient is improving, declining, or no change.  This information will be displayed automatically on your shift  note.  Outcome: Progressing  Flowsheets (Taken 2/18/2025 1820)  Plan of Care Reviewed With: patient  Overall Patient Progress: improving  Goal: Patient-Specific Goal (Individualized)  Description: You can add care plan individualizations to a care plan. Examples of Individualization might be:  \"Parent requests to be called daily at 9am for status\", \"I have a hard time hearing out of my right ear\", or \"Do not touch me to wake me up as it startles  me\".  Outcome: Progressing  Goal: Absence of Hospital-Acquired Illness or Injury  Outcome: Progressing  Intervention: Identify and Manage Fall Risk  Recent Flowsheet Documentation  Taken 2/18/2025 0743 by Shasta Castellanos RN  Safety Promotion/Fall Prevention:   activity supervised   assistive device/personal items within reach   nonskid shoes/slippers when out of bed   safety round/check completed  Intervention: Prevent and Manage VTE (Venous Thromboembolism) Risk  Recent Flowsheet Documentation  Taken 2/18/2025 0743 by Shasta Castellanos RN  VTE Prevention/Management: SCDs off (sequential compression devices)  Intervention: Prevent Infection  Recent Flowsheet Documentation  Taken 2/18/2025 0743 by Shasta Castellanos RN  Infection Prevention: rest/sleep " promoted  Goal: Optimal Comfort and Wellbeing  Outcome: Progressing  Intervention: Monitor Pain and Promote Comfort  Recent Flowsheet Documentation  Taken 2/18/2025 1609 by Shasta Castellanos RN  Pain Management Interventions: medication (see MAR)  Taken 2/18/2025 1031 by Shasta Castellanos RN  Pain Management Interventions: medication (see MAR)  Taken 2/18/2025 0904 by Shasta Castellanos RN  Pain Management Interventions: medication (see MAR)  Taken 2/18/2025 0717 by Shasta Castellanos RN  Pain Management Interventions: declines  Goal: Readiness for Transition of Care  Outcome: Progressing     Problem: Infection  Goal: Absence of Infection Signs and Symptoms  Outcome: Progressing     Problem: Glycemic Control Impaired  Goal: Blood Glucose Level Within Targeted Range  Outcome: Progressing  Intervention: Optimize Glycemic Control  Recent Flowsheet Documentation  Taken 2/18/2025 0743 by Shasta Castellanos RN  Hyperglycemia Management: blood glucose monitored  Goal: Minimize Risk of Hypoglycemia  Outcome: Progressing  Intervention: Management and Risk Reduction of Hypoglycemia  Recent Flowsheet Documentation  Taken 2/18/2025 0743 by Shasta Castellanos RN  Hypoglycemia Management: blood glucose monitored   Goal Outcome Evaluation:      Plan of Care Reviewed With: patient    Overall Patient Progress: improvingOverall Patient Progress: improving

## 2025-06-21 ENCOUNTER — APPOINTMENT (OUTPATIENT)
Dept: GENERAL RADIOLOGY | Facility: CLINIC | Age: 55
End: 2025-06-21
Attending: EMERGENCY MEDICINE
Payer: MEDICARE

## 2025-06-21 ENCOUNTER — APPOINTMENT (OUTPATIENT)
Dept: CT IMAGING | Facility: CLINIC | Age: 55
End: 2025-06-21
Attending: EMERGENCY MEDICINE
Payer: MEDICARE

## 2025-06-21 ENCOUNTER — HOSPITAL ENCOUNTER (EMERGENCY)
Facility: CLINIC | Age: 55
Discharge: HOME | End: 2025-06-21
Attending: EMERGENCY MEDICINE | Admitting: EMERGENCY MEDICINE
Payer: MEDICARE

## 2025-06-21 VITALS
DIASTOLIC BLOOD PRESSURE: 97 MMHG | TEMPERATURE: 98.4 F | OXYGEN SATURATION: 96 % | BODY MASS INDEX: 29.16 KG/M2 | HEART RATE: 94 BPM | RESPIRATION RATE: 24 BRPM | WEIGHT: 175 LBS | SYSTOLIC BLOOD PRESSURE: 162 MMHG | HEIGHT: 65 IN

## 2025-06-21 DIAGNOSIS — E11.65 POORLY CONTROLLED DIABETES MELLITUS (H): ICD-10-CM

## 2025-06-21 DIAGNOSIS — E11.65 TYPE 2 DIABETES MELLITUS WITH HYPERGLYCEMIA, WITH LONG-TERM CURRENT USE OF INSULIN (H): ICD-10-CM

## 2025-06-21 DIAGNOSIS — Z79.4 TYPE 2 DIABETES MELLITUS WITH HYPERGLYCEMIA, WITH LONG-TERM CURRENT USE OF INSULIN (H): ICD-10-CM

## 2025-06-21 DIAGNOSIS — S09.90XA CLOSED HEAD INJURY, INITIAL ENCOUNTER: ICD-10-CM

## 2025-06-21 DIAGNOSIS — S80.00XA CONTUSION OF KNEE, UNSPECIFIED LATERALITY, INITIAL ENCOUNTER: ICD-10-CM

## 2025-06-21 DIAGNOSIS — R29.6 RECURRENT FALLS: ICD-10-CM

## 2025-06-21 LAB
ANION GAP SERPL CALCULATED.3IONS-SCNC: 12 MMOL/L (ref 7–15)
BASOPHILS # BLD AUTO: 0.1 10E3/UL (ref 0–0.2)
BASOPHILS NFR BLD AUTO: 1 %
BUN SERPL-MCNC: 17.5 MG/DL (ref 6–20)
CALCIUM SERPL-MCNC: 9.3 MG/DL (ref 8.8–10.4)
CHLORIDE SERPL-SCNC: 98 MMOL/L (ref 98–107)
CREAT SERPL-MCNC: 0.5 MG/DL (ref 0.51–0.95)
EGFRCR SERPLBLD CKD-EPI 2021: >90 ML/MIN/1.73M2
EOSINOPHIL # BLD AUTO: 0.4 10E3/UL (ref 0–0.7)
EOSINOPHIL NFR BLD AUTO: 4 %
ERYTHROCYTE [DISTWIDTH] IN BLOOD BY AUTOMATED COUNT: 12.9 % (ref 10–15)
EST. AVERAGE GLUCOSE BLD GHB EST-MCNC: 381 MG/DL
GLUCOSE BLDC GLUCOMTR-MCNC: 317 MG/DL (ref 70–99)
GLUCOSE SERPL-MCNC: 462 MG/DL (ref 70–99)
HBA1C MFR BLD: 14.9 %
HCO3 SERPL-SCNC: 25 MMOL/L (ref 22–29)
HCT VFR BLD AUTO: 42.6 % (ref 35–47)
HGB BLD-MCNC: 14.4 G/DL (ref 11.7–15.7)
IMM GRANULOCYTES # BLD: 0 10E3/UL
IMM GRANULOCYTES NFR BLD: 0 %
LYMPHOCYTES # BLD AUTO: 2 10E3/UL (ref 0.8–5.3)
LYMPHOCYTES NFR BLD AUTO: 23 %
MCH RBC QN AUTO: 28 PG (ref 26.5–33)
MCHC RBC AUTO-ENTMCNC: 33.8 G/DL (ref 31.5–36.5)
MCV RBC AUTO: 83 FL (ref 78–100)
MONOCYTES # BLD AUTO: 0.5 10E3/UL (ref 0–1.3)
MONOCYTES NFR BLD AUTO: 6 %
NEUTROPHILS # BLD AUTO: 5.7 10E3/UL (ref 1.6–8.3)
NEUTROPHILS NFR BLD AUTO: 66 %
NRBC # BLD AUTO: 0 10E3/UL
NRBC BLD AUTO-RTO: 0 /100
PLATELET # BLD AUTO: 264 10E3/UL (ref 150–450)
POTASSIUM SERPL-SCNC: 4.3 MMOL/L (ref 3.4–5.3)
RBC # BLD AUTO: 5.14 10E6/UL (ref 3.8–5.2)
SODIUM SERPL-SCNC: 135 MMOL/L (ref 135–145)
WBC # BLD AUTO: 8.6 10E3/UL (ref 4–11)

## 2025-06-21 PROCEDURE — 99285 EMERGENCY DEPT VISIT HI MDM: CPT | Mod: 25

## 2025-06-21 PROCEDURE — 36415 COLL VENOUS BLD VENIPUNCTURE: CPT | Performed by: EMERGENCY MEDICINE

## 2025-06-21 PROCEDURE — 72125 CT NECK SPINE W/O DYE: CPT

## 2025-06-21 PROCEDURE — 258N000003 HC RX IP 258 OP 636: Performed by: EMERGENCY MEDICINE

## 2025-06-21 PROCEDURE — 82962 GLUCOSE BLOOD TEST: CPT

## 2025-06-21 PROCEDURE — 73562 X-RAY EXAM OF KNEE 3: CPT | Mod: 50

## 2025-06-21 PROCEDURE — 96361 HYDRATE IV INFUSION ADD-ON: CPT

## 2025-06-21 PROCEDURE — 83036 HEMOGLOBIN GLYCOSYLATED A1C: CPT | Performed by: EMERGENCY MEDICINE

## 2025-06-21 PROCEDURE — 70450 CT HEAD/BRAIN W/O DYE: CPT

## 2025-06-21 PROCEDURE — 85004 AUTOMATED DIFF WBC COUNT: CPT | Performed by: EMERGENCY MEDICINE

## 2025-06-21 PROCEDURE — 80048 BASIC METABOLIC PNL TOTAL CA: CPT | Performed by: EMERGENCY MEDICINE

## 2025-06-21 PROCEDURE — 93005 ELECTROCARDIOGRAM TRACING: CPT

## 2025-06-21 PROCEDURE — 96360 HYDRATION IV INFUSION INIT: CPT

## 2025-06-21 RX ORDER — METFORMIN HYDROCHLORIDE 500 MG/1
1000 TABLET, EXTENDED RELEASE ORAL
Qty: 60 TABLET | Refills: 0 | Status: SHIPPED | OUTPATIENT
Start: 2025-06-21 | End: 2025-07-21

## 2025-06-21 RX ADMIN — SODIUM CHLORIDE 1000 ML: 0.9 INJECTION, SOLUTION INTRAVENOUS at 09:48

## 2025-06-21 RX ADMIN — SODIUM CHLORIDE 1000 ML: 0.9 INJECTION, SOLUTION INTRAVENOUS at 11:45

## 2025-06-21 ASSESSMENT — ACTIVITIES OF DAILY LIVING (ADL)
ADLS_ACUITY_SCORE: 57
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
## 2025-06-21 NOTE — ED TRIAGE NOTES
Pt arrived from home via EMS w/ c/c mechanical fall this morning after her knees gave out. Pt fell to knees. Did not hit head. No LOC. Pain to bilateral knees 10/10. Pt took her prescribed oxycodone @0800   2 days ago pt had a fall and did hit her head but no LOC.   BGL in route in the 400s

## 2025-06-21 NOTE — DISCHARGE INSTRUCTIONS
Discharge Instructions  Head Injury    You have been seen today for a head injury. Your evaluation included a history and physical examination. You may have had a CT (CAT) scan performed, though most head injuries do not require a scan. Based on this evaluation, your provider today does not feel that your head injury is serious.    Generally, every Emergency Department visit should have a follow-up clinic visit with either a primary or a specialty clinic/provider. Please follow-up as instructed by your emergency provider today.  Return to the Emergency Department if:  You are confused or you are not acting right.  Your headache gets worse or you start to have a really bad headache even with your recommended treatment plan.  You vomit (throw up) more than once.  You have a seizure.  You have trouble walking.  You have weakness or paralysis (cannot move) in an arm or a leg.  You have blood or fluid coming from your ears or nose.  You have new symptoms or anything that worries you.    Sleeping:  It is okay for you to sleep, but someone should wake you up if instructed by your provider, and someone should check on you at your usual time to wake up.     Activity:  Do not drive for at least 24 hours.  Do not drive if you have dizzy spells or trouble concentrating, or remembering things.  Do not return to any contact sports until cleared by your regular provider.     MORE INFORMATION:    Concussion:  A concussion is a minor head injury that may cause temporary problems with the way the brain works. Although concussions are important, they are generally not an emergency or a reason that a person needs to be hospitalized. Some concussion symptoms include confusion, amnesia (forgetful), nausea (sick to your stomach) and vomiting (throwing up), dizziness, fatigue, memory or concentration problems, irritability and sleep problems. For most people, concussions are mild and temporary but some will have more severe and persistent  symptoms that require on-going care and treatment.  CT Scans: Your evaluation today may have included a CT scan (CAT scan) to look for things like bleeding or a skull fracture (broken bone).  CT scans involve radiation and too many CT scans can cause serious health problems like cancer, especially in children.  Because of this, your provider may not have ordered a CT scan today if they think you are at low risk for a serious or life threatening problem.    Blood Thinners. If you take blood thinners, there is a very small risk of delayed bleeding after your head injury. In the days/weeks to come, watch for the symptoms described above particularly headaches, confusion, problems walking, and weakness in an arm or leg.    If you were given a prescription for medicine here today, be sure to read all of the information (including the package insert) that comes with your prescription.  This will include important information about the medicine, its side effects, and any warnings that you need to know about.  The pharmacist who fills the prescription can provide more information and answer questions you may have about the medicine.  If you have questions or concerns that the pharmacist cannot address, please call or return to the Emergency Department.     Remember that you can always come back to the Emergency Department if you are not able to see your regular provider in the amount of time listed above, if you get any new symptoms, or if there is anything that worries you.     Discharge Instructions  Hyperglycemia, High Blood Sugar    Today we found your blood sugar (glucose) was high. This may mean that you have developed diabetes, or if you already know that you have diabetes, it may mean that your diabetes is not as well controlled as it should be. Sometimes blood sugar can be high temporarily and it is not diabetes. Signs of elevated blood sugar include increased thirst, frequent urination (peeing), blurred vision,  fatigue, unexplained weight loss, poor wound healing, and frequent infections.    We sometimes give medicine in the Emergency Department to lower the blood sugar. We may also prescribe medicine for you to use at home, or increase the medicine that you already take. While we do not like to see your blood sugar high, it is much more dangerous to let your blood sugar get too low, so it is reasonable to take time to bring it down, or to wait and watch to see if it comes down on its own.    Generally, every Emergency Department visit should have a follow-up clinic visit with either a primary or a specialty clinic/provider. Please follow-up as instructed by your emergency provider today.     Return to the Emergency Department if you develop:  Vomiting (throwing up).  Confusion, disorientation, or being unable to wake up.  Severe weakness or illness.  Abdominal (belly) pain.    What can I do to help myself?  Check your blood sugar as instructed by your provider.  Take medications prescribed by your provider.  Follow a diabetic diet (low fat, low concentrated sweets, high fiber).  Exercise regularly.  Moderate or eliminate alcohol use.  Stop smoking.    Diabetes: Diabetes mellitus is a disease in which the body cannot regulate the amount of sugar (glucose) in the blood. Insulin allows glucose to move out of the blood into cells throughout the body where it is used for fuel. People with diabetes do not produce enough insulin (type 1 diabetes), or cannot use insulin properly (type 2 diabetes), or both. This starves the cells that need the glucose for fuel, and also harms certain organs and tissues exposed to the high glucose levels.  Over a long period of time, uncontrolled diabetes can lead to heart and blood vessel disease, blindness, kidney failure, foot ulcers and many other problems.          About 17 million Americans (6.2% of adults) have diabetes. We think that about one third of adults with diabetes do not know they  have diabetes.  The incidence of diabetes is increasing rapidly. This increase is due to many factors, but the most significant are our increasing weight and decreased activity levels.     Diabetes can be a very serious and life-threatening illness if not treated.  If you were given a prescription for medicine here today, be sure to read all of the information (including the package insert) that comes with your prescription.  This will include important information about the medicine, its side effects, and any warnings that you need to know about.  The pharmacist who fills the prescription can provide more information and answer questions you may have about the medicine.  If you have questions or concerns that the pharmacist cannot address, please call or return to the Emergency Department.   Remember that you can always come back to the Emergency Department if you are not able to see your regular provider in the amount of time listed above, if you get any new symptoms, or if there is anything that worries you.
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
## 2025-06-21 NOTE — ED NOTES
Bed: ED36  Expected date:   Expected time:   Means of arrival:   Comments:  [address]- 55yo, F, fall

## 2025-06-23 LAB
ATRIAL RATE - MUSE: 101 BPM
DIASTOLIC BLOOD PRESSURE - MUSE: NORMAL MMHG
INTERPRETATION ECG - MUSE: NORMAL
P AXIS - MUSE: 38 DEGREES
PR INTERVAL - MUSE: 132 MS
QRS DURATION - MUSE: 84 MS
QT - MUSE: 340 MS
QTC - MUSE: 440 MS
R AXIS - MUSE: 32 DEGREES
SYSTOLIC BLOOD PRESSURE - MUSE: NORMAL MMHG
T AXIS - MUSE: 59 DEGREES
VENTRICULAR RATE- MUSE: 101 BPM

## 2025-07-27 ENCOUNTER — APPOINTMENT (OUTPATIENT)
Dept: CT IMAGING | Facility: CLINIC | Age: 55
End: 2025-07-27
Attending: EMERGENCY MEDICINE
Payer: MEDICARE

## 2025-07-27 ENCOUNTER — APPOINTMENT (OUTPATIENT)
Dept: ULTRASOUND IMAGING | Facility: CLINIC | Age: 55
End: 2025-07-27
Attending: HOSPITALIST
Payer: MEDICARE

## 2025-07-27 ENCOUNTER — APPOINTMENT (OUTPATIENT)
Dept: ULTRASOUND IMAGING | Facility: CLINIC | Age: 55
End: 2025-07-27
Attending: EMERGENCY MEDICINE
Payer: MEDICARE

## 2025-07-27 ENCOUNTER — HOSPITAL ENCOUNTER (INPATIENT)
Facility: CLINIC | Age: 55
LOS: 3 days | Discharge: HOME | End: 2025-07-30
Attending: EMERGENCY MEDICINE | Admitting: HOSPITALIST
Payer: MEDICARE

## 2025-07-27 DIAGNOSIS — I26.99 OTHER ACUTE PULMONARY EMBOLISM WITHOUT ACUTE COR PULMONALE (H): ICD-10-CM

## 2025-07-27 DIAGNOSIS — R11.2 NAUSEA VOMITING AND DIARRHEA: ICD-10-CM

## 2025-07-27 DIAGNOSIS — R19.7 NAUSEA VOMITING AND DIARRHEA: ICD-10-CM

## 2025-07-27 DIAGNOSIS — Z79.4 TYPE 2 DIABETES MELLITUS WITH HYPERGLYCEMIA, WITH LONG-TERM CURRENT USE OF INSULIN (H): ICD-10-CM

## 2025-07-27 DIAGNOSIS — E11.65 TYPE 2 DIABETES MELLITUS WITH HYPERGLYCEMIA, WITH LONG-TERM CURRENT USE OF INSULIN (H): ICD-10-CM

## 2025-07-27 DIAGNOSIS — K52.9 COLITIS: Primary | ICD-10-CM

## 2025-07-27 LAB
ALBUMIN SERPL BCG-MCNC: 3.7 G/DL (ref 3.5–5.2)
ALBUMIN UR-MCNC: NEGATIVE MG/DL
ALP SERPL-CCNC: 141 U/L (ref 40–150)
ALT SERPL W P-5'-P-CCNC: 15 U/L (ref 0–50)
ANION GAP SERPL CALCULATED.3IONS-SCNC: 14 MMOL/L (ref 7–15)
APPEARANCE UR: CLEAR
AST SERPL W P-5'-P-CCNC: 18 U/L (ref 0–45)
B-OH-BUTYR SERPL-SCNC: 0.24 MMOL/L
BASOPHILS # BLD AUTO: 0 10E3/UL (ref 0–0.2)
BASOPHILS NFR BLD AUTO: 0 %
BILIRUB SERPL-MCNC: 0.5 MG/DL
BILIRUB UR QL STRIP: NEGATIVE
BUN SERPL-MCNC: 16 MG/DL (ref 6–20)
C CAYETANENSIS DNA STL QL NAA+NON-PROBE: NEGATIVE
CALCIUM SERPL-MCNC: 8.9 MG/DL (ref 8.8–10.4)
CAMPYLOBACTER DNA SPEC NAA+PROBE: NEGATIVE
CHLORIDE SERPL-SCNC: 102 MMOL/L (ref 98–107)
CK SERPL-CCNC: 29 U/L (ref 26–192)
COLOR UR AUTO: YELLOW
CREAT SERPL-MCNC: 0.67 MG/DL (ref 0.51–0.95)
CRYPTOSP DNA STL QL NAA+NON-PROBE: NEGATIVE
D DIMER PPP FEU-MCNC: >20 UG/ML FEU (ref 0–0.5)
EC STX1+STX2 GENES STL QL NAA+NON-PROBE: NEGATIVE
EGFRCR SERPLBLD CKD-EPI 2021: >90 ML/MIN/1.73M2
EOSINOPHIL # BLD AUTO: 0.2 10E3/UL (ref 0–0.7)
EOSINOPHIL NFR BLD AUTO: 1 %
ERYTHROCYTE [DISTWIDTH] IN BLOOD BY AUTOMATED COUNT: 13.2 % (ref 10–15)
ERYTHROCYTE [DISTWIDTH] IN BLOOD BY AUTOMATED COUNT: 13.4 % (ref 10–15)
FLUAV RNA SPEC QL NAA+PROBE: NEGATIVE
FLUBV RNA RESP QL NAA+PROBE: NEGATIVE
G LAMBLIA DNA STL QL NAA+NON-PROBE: NEGATIVE
GLUCOSE BLDC GLUCOMTR-MCNC: 167 MG/DL (ref 70–99)
GLUCOSE BLDC GLUCOMTR-MCNC: 203 MG/DL (ref 70–99)
GLUCOSE BLDC GLUCOMTR-MCNC: 285 MG/DL (ref 70–99)
GLUCOSE BLDC GLUCOMTR-MCNC: 321 MG/DL (ref 70–99)
GLUCOSE BLDC GLUCOMTR-MCNC: 346 MG/DL (ref 70–99)
GLUCOSE BLDC GLUCOMTR-MCNC: 406 MG/DL (ref 70–99)
GLUCOSE SERPL-MCNC: 414 MG/DL (ref 70–99)
GLUCOSE UR STRIP-MCNC: >=1000 MG/DL
HCO3 BLDV-SCNC: 26 MMOL/L (ref 21–28)
HCO3 SERPL-SCNC: 23 MMOL/L (ref 22–29)
HCT VFR BLD AUTO: 37.9 % (ref 35–47)
HCT VFR BLD AUTO: 40.4 % (ref 35–47)
HGB BLD-MCNC: 12.9 G/DL (ref 11.7–15.7)
HGB BLD-MCNC: 13.4 G/DL (ref 11.7–15.7)
HGB UR QL STRIP: NEGATIVE
HYALINE CASTS: 5 /LPF
IMM GRANULOCYTES # BLD: 0 10E3/UL
IMM GRANULOCYTES NFR BLD: 0 %
KETONES UR STRIP-MCNC: NEGATIVE MG/DL
LACTATE BLD-SCNC: 2.7 MMOL/L (ref 0.7–2)
LACTATE SERPL-SCNC: 1.1 MMOL/L (ref 0.7–2)
LEUKOCYTE ESTERASE UR QL STRIP: NEGATIVE
LIPASE SERPL-CCNC: 20 U/L (ref 13–60)
LYMPHOCYTES # BLD AUTO: 0.7 10E3/UL (ref 0.8–5.3)
LYMPHOCYTES NFR BLD AUTO: 5 %
MAGNESIUM SERPL-MCNC: 1.7 MG/DL (ref 1.7–2.3)
MCH RBC QN AUTO: 27.9 PG (ref 26.5–33)
MCH RBC QN AUTO: 27.9 PG (ref 26.5–33)
MCHC RBC AUTO-ENTMCNC: 33.2 G/DL (ref 31.5–36.5)
MCHC RBC AUTO-ENTMCNC: 34 G/DL (ref 31.5–36.5)
MCV RBC AUTO: 82 FL (ref 78–100)
MCV RBC AUTO: 84 FL (ref 78–100)
MONOCYTES # BLD AUTO: 0.5 10E3/UL (ref 0–1.3)
MONOCYTES NFR BLD AUTO: 4 %
MUCOUS THREADS #/AREA URNS LPF: PRESENT /LPF
NEUTROPHILS # BLD AUTO: 11.5 10E3/UL (ref 1.6–8.3)
NEUTROPHILS NFR BLD AUTO: 90 %
NITRATE UR QL: NEGATIVE
NOROVIRUS GI+II RNA STL QL NAA+NON-PROBE: NEGATIVE
NRBC # BLD AUTO: 0 10E3/UL
NRBC BLD AUTO-RTO: 0 /100
NT-PROBNP SERPL-MCNC: <36 PG/ML (ref 0–226)
PCO2 BLDV: 48 MM HG (ref 40–50)
PH BLDV: 7.34 [PH] (ref 7.32–7.43)
PH UR STRIP: 5 [PH] (ref 5–7)
PLATELET # BLD AUTO: 221 10E3/UL (ref 150–450)
PLATELET # BLD AUTO: 226 10E3/UL (ref 150–450)
PO2 BLDV: 23 MM HG (ref 25–47)
POTASSIUM SERPL-SCNC: 3.8 MMOL/L (ref 3.4–5.3)
PROT SERPL-MCNC: 6.2 G/DL (ref 6.4–8.3)
RADIOLOGIST FLAGS: ABNORMAL
RBC # BLD AUTO: 4.62 10E6/UL (ref 3.8–5.2)
RBC # BLD AUTO: 4.81 10E6/UL (ref 3.8–5.2)
RBC URINE: 1 /HPF
RSV RNA SPEC NAA+PROBE: NEGATIVE
SALMONELLA SP RPOD STL QL NAA+PROBE: NEGATIVE
SAO2 % BLDV: 37 % (ref 70–75)
SARS-COV-2 RNA RESP QL NAA+PROBE: NEGATIVE
SHIGELLA SP+EIEC IPAH ST NAA+NON-PROBE: NEGATIVE
SODIUM SERPL-SCNC: 139 MMOL/L (ref 135–145)
SP GR UR STRIP: 1.03 (ref 1–1.03)
SQUAMOUS EPITHELIAL: <1 /HPF
TROPONIN T SERPL HS-MCNC: 14 NG/L
TROPONIN T SERPL HS-MCNC: 15 NG/L
UFH PPP CHRO-ACNC: 0.37 IU/ML (ref ?–1.1)
UFH PPP CHRO-ACNC: 0.89 IU/ML (ref ?–1.1)
UROBILINOGEN UR STRIP-MCNC: NORMAL MG/DL
VIBRIO DNA SPEC NAA+PROBE: NEGATIVE
WBC # BLD AUTO: 12.4 10E3/UL (ref 4–11)
WBC # BLD AUTO: 12.9 10E3/UL (ref 4–11)
WBC URINE: 1 /HPF
Y ENTEROCOL DNA STL QL NAA+PROBE: NEGATIVE

## 2025-07-27 PROCEDURE — 36415 COLL VENOUS BLD VENIPUNCTURE: CPT | Performed by: EMERGENCY MEDICINE

## 2025-07-27 PROCEDURE — 250N000011 HC RX IP 250 OP 636: Performed by: HOSPITALIST

## 2025-07-27 PROCEDURE — 83690 ASSAY OF LIPASE: CPT | Performed by: EMERGENCY MEDICINE

## 2025-07-27 PROCEDURE — 82962 GLUCOSE BLOOD TEST: CPT

## 2025-07-27 PROCEDURE — 83605 ASSAY OF LACTIC ACID: CPT | Performed by: EMERGENCY MEDICINE

## 2025-07-27 PROCEDURE — 120N000001 HC R&B MED SURG/OB

## 2025-07-27 PROCEDURE — 87506 IADNA-DNA/RNA PROBE TQ 6-11: CPT | Performed by: INTERNAL MEDICINE

## 2025-07-27 PROCEDURE — 83605 ASSAY OF LACTIC ACID: CPT

## 2025-07-27 PROCEDURE — 258N000003 HC RX IP 258 OP 636: Performed by: INTERNAL MEDICINE

## 2025-07-27 PROCEDURE — 87040 BLOOD CULTURE FOR BACTERIA: CPT | Performed by: EMERGENCY MEDICINE

## 2025-07-27 PROCEDURE — 76705 ECHO EXAM OF ABDOMEN: CPT

## 2025-07-27 PROCEDURE — 85027 COMPLETE CBC AUTOMATED: CPT | Performed by: EMERGENCY MEDICINE

## 2025-07-27 PROCEDURE — 36415 COLL VENOUS BLD VENIPUNCTURE: CPT | Performed by: INTERNAL MEDICINE

## 2025-07-27 PROCEDURE — 250N000013 HC RX MED GY IP 250 OP 250 PS 637: Performed by: HOSPITALIST

## 2025-07-27 PROCEDURE — 93005 ELECTROCARDIOGRAM TRACING: CPT

## 2025-07-27 PROCEDURE — 99222 1ST HOSP IP/OBS MODERATE 55: CPT | Mod: AI | Performed by: HOSPITALIST

## 2025-07-27 PROCEDURE — 250N000011 HC RX IP 250 OP 636: Performed by: INTERNAL MEDICINE

## 2025-07-27 PROCEDURE — 250N000011 HC RX IP 250 OP 636: Performed by: EMERGENCY MEDICINE

## 2025-07-27 PROCEDURE — 71275 CT ANGIOGRAPHY CHEST: CPT

## 2025-07-27 PROCEDURE — 99285 EMERGENCY DEPT VISIT HI MDM: CPT | Mod: 25 | Performed by: EMERGENCY MEDICINE

## 2025-07-27 PROCEDURE — 87637 SARSCOV2&INF A&B&RSV AMP PRB: CPT | Performed by: EMERGENCY MEDICINE

## 2025-07-27 PROCEDURE — 82010 KETONE BODYS QUAN: CPT | Performed by: EMERGENCY MEDICINE

## 2025-07-27 PROCEDURE — 93970 EXTREMITY STUDY: CPT

## 2025-07-27 PROCEDURE — 250N000013 HC RX MED GY IP 250 OP 250 PS 637: Performed by: INTERNAL MEDICINE

## 2025-07-27 PROCEDURE — 82550 ASSAY OF CK (CPK): CPT | Performed by: EMERGENCY MEDICINE

## 2025-07-27 PROCEDURE — 86147 CARDIOLIPIN ANTIBODY EA IG: CPT | Performed by: INTERNAL MEDICINE

## 2025-07-27 PROCEDURE — 83735 ASSAY OF MAGNESIUM: CPT | Performed by: EMERGENCY MEDICINE

## 2025-07-27 PROCEDURE — 96360 HYDRATION IV INFUSION INIT: CPT | Mod: 59

## 2025-07-27 PROCEDURE — 250N000009 HC RX 250: Performed by: EMERGENCY MEDICINE

## 2025-07-27 PROCEDURE — 84484 ASSAY OF TROPONIN QUANT: CPT | Performed by: EMERGENCY MEDICINE

## 2025-07-27 PROCEDURE — 85379 FIBRIN DEGRADATION QUANT: CPT | Performed by: EMERGENCY MEDICINE

## 2025-07-27 PROCEDURE — 85014 HEMATOCRIT: CPT | Performed by: EMERGENCY MEDICINE

## 2025-07-27 PROCEDURE — 85520 HEPARIN ASSAY: CPT | Performed by: INTERNAL MEDICINE

## 2025-07-27 PROCEDURE — 80053 COMPREHEN METABOLIC PANEL: CPT | Performed by: EMERGENCY MEDICINE

## 2025-07-27 PROCEDURE — 99285 EMERGENCY DEPT VISIT HI MDM: CPT | Mod: 25

## 2025-07-27 PROCEDURE — 86146 BETA-2 GLYCOPROTEIN ANTIBODY: CPT | Performed by: INTERNAL MEDICINE

## 2025-07-27 PROCEDURE — 93975 VASCULAR STUDY: CPT

## 2025-07-27 PROCEDURE — 81003 URINALYSIS AUTO W/O SCOPE: CPT | Performed by: EMERGENCY MEDICINE

## 2025-07-27 PROCEDURE — 96361 HYDRATE IV INFUSION ADD-ON: CPT

## 2025-07-27 PROCEDURE — 87040 BLOOD CULTURE FOR BACTERIA: CPT | Performed by: INTERNAL MEDICINE

## 2025-07-27 PROCEDURE — 70450 CT HEAD/BRAIN W/O DYE: CPT

## 2025-07-27 PROCEDURE — 258N000003 HC RX IP 258 OP 636: Performed by: EMERGENCY MEDICINE

## 2025-07-27 PROCEDURE — 250N000012 HC RX MED GY IP 250 OP 636 PS 637: Performed by: INTERNAL MEDICINE

## 2025-07-27 PROCEDURE — 250N000012 HC RX MED GY IP 250 OP 636 PS 637: Performed by: EMERGENCY MEDICINE

## 2025-07-27 PROCEDURE — 74177 CT ABD & PELVIS W/CONTRAST: CPT

## 2025-07-27 PROCEDURE — 83880 ASSAY OF NATRIURETIC PEPTIDE: CPT | Performed by: EMERGENCY MEDICINE

## 2025-07-27 PROCEDURE — 99207 PR NO CHARGE LOS: CPT | Performed by: INTERNAL MEDICINE

## 2025-07-27 PROCEDURE — 250N000012 HC RX MED GY IP 250 OP 636 PS 637: Performed by: HOSPITALIST

## 2025-07-27 RX ORDER — INSULIN GLARGINE-YFGN 100 [IU]/ML
20 INJECTION, SOLUTION SUBCUTANEOUS EVERY EVENING
COMMUNITY

## 2025-07-27 RX ORDER — OXYCODONE HYDROCHLORIDE 10 MG/1
10 TABLET ORAL EVERY 4 HOURS PRN
Status: DISCONTINUED | OUTPATIENT
Start: 2025-07-27 | End: 2025-07-29

## 2025-07-27 RX ORDER — METFORMIN HYDROCHLORIDE 500 MG/1
500 TABLET, EXTENDED RELEASE ORAL 2 TIMES DAILY WITH MEALS
Status: ON HOLD | COMMUNITY
End: 2025-07-30

## 2025-07-27 RX ORDER — LORAZEPAM 1 MG/1
1 TABLET ORAL
Status: DISCONTINUED | OUTPATIENT
Start: 2025-07-27 | End: 2025-07-29

## 2025-07-27 RX ORDER — CYCLOBENZAPRINE HCL 10 MG
10 TABLET ORAL 3 TIMES DAILY
Status: DISCONTINUED | OUTPATIENT
Start: 2025-07-27 | End: 2025-07-30 | Stop reason: HOSPADM

## 2025-07-27 RX ORDER — ONDANSETRON 4 MG/1
4 TABLET, ORALLY DISINTEGRATING ORAL EVERY 12 HOURS PRN
COMMUNITY

## 2025-07-27 RX ORDER — HEPARIN SODIUM 10000 [USP'U]/100ML
0-5000 INJECTION, SOLUTION INTRAVENOUS CONTINUOUS
Status: DISCONTINUED | OUTPATIENT
Start: 2025-07-27 | End: 2025-07-30

## 2025-07-27 RX ORDER — METFORMIN HYDROCHLORIDE 500 MG/1
1000 TABLET, EXTENDED RELEASE ORAL
Status: CANCELLED | OUTPATIENT
Start: 2025-07-27

## 2025-07-27 RX ORDER — INSULIN GLARGINE-YFGN 100 [IU]/ML
40 INJECTION, SOLUTION SUBCUTANEOUS DAILY
Status: DISCONTINUED | OUTPATIENT
Start: 2025-07-27 | End: 2025-07-27

## 2025-07-27 RX ORDER — PIPERACILLIN SODIUM, TAZOBACTAM SODIUM 4; .5 G/20ML; G/20ML
4.5 INJECTION, POWDER, LYOPHILIZED, FOR SOLUTION INTRAVENOUS ONCE
Status: COMPLETED | OUTPATIENT
Start: 2025-07-27 | End: 2025-07-27

## 2025-07-27 RX ORDER — HEPARIN SODIUM 10000 [USP'U]/100ML
0-5000 INJECTION, SOLUTION INTRAVENOUS CONTINUOUS
Status: DISCONTINUED | OUTPATIENT
Start: 2025-07-27 | End: 2025-07-27

## 2025-07-27 RX ORDER — ONDANSETRON 2 MG/ML
4 INJECTION INTRAMUSCULAR; INTRAVENOUS EVERY 6 HOURS PRN
Status: DISCONTINUED | OUTPATIENT
Start: 2025-07-27 | End: 2025-07-27

## 2025-07-27 RX ORDER — IPRATROPIUM BROMIDE AND ALBUTEROL SULFATE 2.5; .5 MG/3ML; MG/3ML
1 SOLUTION RESPIRATORY (INHALATION) EVERY 6 HOURS PRN
COMMUNITY

## 2025-07-27 RX ORDER — AMOXICILLIN 250 MG
1 CAPSULE ORAL 2 TIMES DAILY PRN
Status: CANCELLED | OUTPATIENT
Start: 2025-07-27

## 2025-07-27 RX ORDER — ONDANSETRON 4 MG/1
4 TABLET, ORALLY DISINTEGRATING ORAL EVERY 6 HOURS PRN
Status: DISCONTINUED | OUTPATIENT
Start: 2025-07-27 | End: 2025-07-27

## 2025-07-27 RX ORDER — CYCLOBENZAPRINE HCL 10 MG
10 TABLET ORAL EVERY 8 HOURS PRN
Status: DISCONTINUED | OUTPATIENT
Start: 2025-07-27 | End: 2025-07-27

## 2025-07-27 RX ORDER — CALCIUM CARBONATE 500 MG/1
1000 TABLET, CHEWABLE ORAL 4 TIMES DAILY PRN
Status: DISCONTINUED | OUTPATIENT
Start: 2025-07-27 | End: 2025-07-30 | Stop reason: HOSPADM

## 2025-07-27 RX ORDER — LIDOCAINE 40 MG/G
CREAM TOPICAL
Status: DISCONTINUED | OUTPATIENT
Start: 2025-07-27 | End: 2025-07-30 | Stop reason: HOSPADM

## 2025-07-27 RX ORDER — OXYCODONE HYDROCHLORIDE 10 MG/1
10 TABLET ORAL
COMMUNITY

## 2025-07-27 RX ORDER — AMOXICILLIN 250 MG
2 CAPSULE ORAL 2 TIMES DAILY PRN
Status: CANCELLED | OUTPATIENT
Start: 2025-07-27

## 2025-07-27 RX ORDER — DEXTROSE MONOHYDRATE 25 G/50ML
25-50 INJECTION, SOLUTION INTRAVENOUS
Status: DISCONTINUED | OUTPATIENT
Start: 2025-07-27 | End: 2025-07-30 | Stop reason: HOSPADM

## 2025-07-27 RX ORDER — ONDANSETRON 2 MG/ML
4 INJECTION INTRAMUSCULAR; INTRAVENOUS ONCE
Status: COMPLETED | OUTPATIENT
Start: 2025-07-27 | End: 2025-07-27

## 2025-07-27 RX ORDER — LEVOTHYROXINE SODIUM 100 UG/1
100 TABLET ORAL DAILY
Status: DISCONTINUED | OUTPATIENT
Start: 2025-07-27 | End: 2025-07-30 | Stop reason: HOSPADM

## 2025-07-27 RX ORDER — IOPAMIDOL 755 MG/ML
500 INJECTION, SOLUTION INTRAVASCULAR ONCE
Status: COMPLETED | OUTPATIENT
Start: 2025-07-27 | End: 2025-07-27

## 2025-07-27 RX ORDER — NICOTINE POLACRILEX 4 MG
15-30 LOZENGE BUCCAL
Status: DISCONTINUED | OUTPATIENT
Start: 2025-07-27 | End: 2025-07-30 | Stop reason: HOSPADM

## 2025-07-27 RX ORDER — ALBUTEROL SULFATE 90 UG/1
1-2 INHALANT RESPIRATORY (INHALATION) EVERY 6 HOURS PRN
Status: DISCONTINUED | OUTPATIENT
Start: 2025-07-27 | End: 2025-07-27

## 2025-07-27 RX ORDER — CETIRIZINE HYDROCHLORIDE 10 MG/1
10 TABLET ORAL EVERY EVENING
COMMUNITY

## 2025-07-27 RX ORDER — ONDANSETRON 4 MG/1
4 TABLET, ORALLY DISINTEGRATING ORAL EVERY 6 HOURS PRN
Status: DISCONTINUED | OUTPATIENT
Start: 2025-07-27 | End: 2025-07-30 | Stop reason: HOSPADM

## 2025-07-27 RX ORDER — ALBUTEROL SULFATE 90 UG/1
2 INHALANT RESPIRATORY (INHALATION) EVERY 6 HOURS PRN
Status: DISCONTINUED | OUTPATIENT
Start: 2025-07-27 | End: 2025-07-30 | Stop reason: HOSPADM

## 2025-07-27 RX ORDER — ONDANSETRON 2 MG/ML
4 INJECTION INTRAMUSCULAR; INTRAVENOUS EVERY 6 HOURS PRN
Status: DISCONTINUED | OUTPATIENT
Start: 2025-07-27 | End: 2025-07-30 | Stop reason: HOSPADM

## 2025-07-27 RX ADMIN — SODIUM CHLORIDE 1000 ML: 0.9 INJECTION, SOLUTION INTRAVENOUS at 13:41

## 2025-07-27 RX ADMIN — ONDANSETRON 4 MG: 2 INJECTION, SOLUTION INTRAMUSCULAR; INTRAVENOUS at 07:40

## 2025-07-27 RX ADMIN — HEPARIN SODIUM 1450 UNITS/HR: 10000 INJECTION, SOLUTION INTRAVENOUS at 07:25

## 2025-07-27 RX ADMIN — SODIUM CHLORIDE 91 ML: 9 INJECTION, SOLUTION INTRAVENOUS at 05:31

## 2025-07-27 RX ADMIN — IOPAMIDOL 71 ML: 755 INJECTION, SOLUTION INTRAVENOUS at 05:31

## 2025-07-27 RX ADMIN — INSULIN ASPART 3 UNITS: 100 INJECTION, SOLUTION INTRAVENOUS; SUBCUTANEOUS at 18:19

## 2025-07-27 RX ADMIN — HEPARIN SODIUM 1350 UNITS/HR: 10000 INJECTION, SOLUTION INTRAVENOUS at 23:06

## 2025-07-27 RX ADMIN — LEVOTHYROXINE SODIUM 100 MCG: 0.1 TABLET ORAL at 23:13

## 2025-07-27 RX ADMIN — PIPERACILLIN AND TAZOBACTAM 4.5 G: 4; .5 INJECTION, POWDER, FOR SOLUTION INTRAVENOUS at 09:17

## 2025-07-27 RX ADMIN — CYCLOBENZAPRINE 10 MG: 10 TABLET, FILM COATED ORAL at 15:07

## 2025-07-27 RX ADMIN — ONDANSETRON 4 MG: 4 TABLET, ORALLY DISINTEGRATING ORAL at 12:28

## 2025-07-27 RX ADMIN — CYCLOBENZAPRINE 10 MG: 10 TABLET, FILM COATED ORAL at 20:38

## 2025-07-27 RX ADMIN — INSULIN ASPART 10 UNITS: 100 INJECTION, SOLUTION INTRAVENOUS; SUBCUTANEOUS at 15:06

## 2025-07-27 RX ADMIN — INSULIN ASPART 10 UNITS: 100 INJECTION, SOLUTION INTRAVENOUS; SUBCUTANEOUS at 06:43

## 2025-07-27 RX ADMIN — OXYCODONE HYDROCHLORIDE 10 MG: 10 TABLET ORAL at 12:17

## 2025-07-27 RX ADMIN — INSULIN GLARGINE 20 UNITS: 100 INJECTION, SOLUTION SUBCUTANEOUS at 23:14

## 2025-07-27 RX ADMIN — SODIUM CHLORIDE 1000 ML: 9 INJECTION, SOLUTION INTRAVENOUS at 04:26

## 2025-07-27 RX ADMIN — SODIUM CHLORIDE 1000 ML: 9 INJECTION, SOLUTION INTRAVENOUS at 03:39

## 2025-07-27 RX ADMIN — OXYCODONE HYDROCHLORIDE 10 MG: 10 TABLET ORAL at 23:13

## 2025-07-27 ASSESSMENT — ACTIVITIES OF DAILY LIVING (ADL)
ADLS_ACUITY_SCORE: 57
ADLS_ACUITY_SCORE: 57
ADLS_ACUITY_SCORE: 67
ADLS_ACUITY_SCORE: 68
ADLS_ACUITY_SCORE: 57
ADLS_ACUITY_SCORE: 57
ADLS_ACUITY_SCORE: 67
ADLS_ACUITY_SCORE: 67
ADLS_ACUITY_SCORE: 57
ADLS_ACUITY_SCORE: 67
ADLS_ACUITY_SCORE: 57
ADLS_ACUITY_SCORE: 57
ADLS_ACUITY_SCORE: 68
ADLS_ACUITY_SCORE: 67
ADLS_ACUITY_SCORE: 57
ADLS_ACUITY_SCORE: 58
ADLS_ACUITY_SCORE: 67
ADLS_ACUITY_SCORE: 57

## 2025-07-27 NOTE — ED NOTES
[facility]  ED Nurse Handoff Report    ED Chief complaint: Nausea, Vomiting, & Diarrhea  . ED Diagnosis:   Final diagnoses:   Colitis   Other acute pulmonary embolism without acute cor pulmonale (H)   Type 2 diabetes mellitus with hyperglycemia, with long-term current use of insulin (H)       Allergies:   Allergies   Allergen Reactions    Other [No Clinical Screening - See Comments]      Flu shot       Code Status: Full Code    Activity level - Baseline/Home:  independent.  Activity Level - Current:   assist of 2.   Lift room needed: No.   Bariatric: No   Needed: No   Isolation: No.   Infection: Not Applicable.     Respiratory status: Room air    Vital Signs (within 30 minutes):   Vitals:    07/27/25 0250 07/27/25 0313 07/27/25 0350   BP: 100/60  129/71   Pulse:  98 100   Resp:  18    Temp:  98.9  F (37.2  C)    TempSrc:  Oral    SpO2:  94% 96%       Cardiac Rhythm:  ,      Pain level:    Patient confused: No.   Patient Falls Risk: patient and family education.   Elimination Status: Has voided     Patient Report - Initial Complaint: Nausea, vomiting and diarrhea.   Focused Assessment:   0400  Gastrointestinal  FH    GastrointestinalGastrointestinal WDL: .WDL except (pt reports abdominal pain, nausea, vomiting, and diarrhea that began a few hours PTA.)           Abnormal Results:   Labs Ordered and Resulted from Time of ED Arrival to Time of ED Departure   COMPREHENSIVE METABOLIC PANEL (LIMITED OCCURRENCES) - Abnormal       Result Value    Sodium 139      Potassium 3.8      Carbon Dioxide (CO2) 23      Anion Gap 14      Urea Nitrogen 16.0      Creatinine 0.67      GFR Estimate >90      Calcium 8.9      Chloride 102      Glucose 414 (*)     Alkaline Phosphatase 141      AST 18      ALT 15      Protein Total 6.2 (*)     Albumin 3.7      Bilirubin Total 0.5     TROPONIN T, HIGH SENSITIVITY - Abnormal    Troponin T, High Sensitivity 15 (*)    D DIMER QUANTITATIVE - Abnormal    D-Dimer  Quantitative >20.00 (*)    CBC WITH PLATELETS AND DIFFERENTIAL - Abnormal    WBC Count 12.9 (*)     RBC Count 4.81      Hemoglobin 13.4      Hematocrit 40.4      MCV 84      MCH 27.9      MCHC 33.2      RDW 13.2      Platelet Count 226      % Neutrophils 90      % Lymphocytes 5      % Monocytes 4      % Eosinophils 1      % Basophils 0      % Immature Granulocytes 0      NRBCs per 100 WBC 0      Absolute Neutrophils 11.5 (*)     Absolute Lymphocytes 0.7 (*)     Absolute Monocytes 0.5      Absolute Eosinophils 0.2      Absolute Basophils 0.0      Absolute Immature Granulocytes 0.0      Absolute NRBCs 0.0     ISTAT GASES LACTATE VENOUS POCT - Abnormal    Lactic Acid POCT 2.7 (*)     Bicarbonate Venous POCT 26      O2 Sat, Venous POCT 37 (*)     pCO2 Venous POCT 48      pH Venous POCT 7.34      pO2 Venous POCT 23 (*)    ROUTINE UA WITH MICROSCOPIC REFLEX TO CULTURE - Abnormal    Color Urine Yellow      Appearance Urine Clear      Glucose Urine >=1000 (*)     Bilirubin Urine Negative      Ketones Urine Negative      Specific Gravity Urine 1.035      Blood Urine Negative      pH Urine 5.0      Protein Albumin Urine Negative      Urobilinogen Urine Normal      Nitrite Urine Negative      Leukocyte Esterase Urine Negative      Mucus Urine Present (*)     RBC Urine 1      WBC Urine 1      Squamous Epithelials Urine <1      Hyaline Casts Urine 5 (*)    GLUCOSE BY METER - Abnormal    GLUCOSE BY METER POCT 346 (*)    LIPASE - Normal    Lipase 20     MAGNESIUM (LIMITED OCCURRENCES) - Normal    Magnesium 1.7     INFLUENZA A/B, RSV AND SARS-COV2 PCR - Normal    Influenza A PCR Negative      Influenza B PCR Negative      RSV PCR Negative      SARS CoV2 PCR Negative     KETONE BETA-HYDROXYBUTYRATE QUANTITATIVE, RAPID - Normal    Ketone (Beta-Hydroxybutyrate) Quantitative 0.24     CK TOTAL - Normal    CK 29     TROPONIN T, HIGH SENSITIVITY - Normal    Troponin T, High Sensitivity 14     LACTIC ACID WHOLE BLOOD WITH 1X REPEAT IN 2 HR  WHEN >2 - Normal    Lactic Acid, Initial 1.1     GLUCOSE MONITOR NURSING POCT   CBC WITH PLATELETS   NT-PROBNP   BLOOD CULTURE   BLOOD CULTURE        CT Chest (PE) Abdomen Pelvis w Contrast   Final Result   Abnormal   IMPRESSION:   1.  Acute pulmonary embolism in the segmental branch of right upper lobe, possibly additional subsegmental upper lobe emboli.    2.  No CT evidence right heart strain.   3.  Probable large volume intrahepatic portal vein THROMBUS entire left portal vein and multiple segmental and subsegmental intrahepatic branches. Recommend right upper quadrant liver duplex for confirmation. No extrahepatic portal vein, splenic, SMV, or    IMV thrombus.   4.  Moderate degree of proctocolitis involving left hemicolon and rectum. Ordinarily infectious or inflammatory(such as ulcerative colitis) etiologies are favored, but this could alternatively represent recent IMV venous thombosis (which could have    migrated to the intrahepatic portal vein at the time of imaging).         [Critical Result: New diagnosis of pulmonary embolism, portal vein thrombosis]      Finding was identified on 7/27/2025 6:23 AM CDT.       Dr. Bhatia was contacted by me on 7/27/2025 6:53 AM CDT and verbalized understanding of the critical result.      Head CT w/o contrast   Final Result   IMPRESSION:   1.  No CT finding of a mass, hemorrhage or focal area suggestive of acute infarct.         US Abdomen Limited    (Results Pending)       Treatments provided: See MAR  Family Comments: N/A  OBS brochure/video discussed/provided to patient:  No  ED Medications:   Medications   piperacillin-tazobactam (ZOSYN) 4.5 g vial to attach to  mL bag (has no administration in time range)   heparin ANTICOAGULANT Loading dose for HIGH INTENSITY TREATMENT * Give BEFORE starting heparin infusion (has no administration in time range)   heparin 25,000 units in 0.45% NaCl 250 mL ANTICOAGULANT infusion (has no administration in time range)    sodium chloride 0.9% BOLUS 1,000 mL (0 mLs Intravenous Stopped 7/27/25 0608)   sodium chloride 0.9% BOLUS 1,000 mL (0 mLs Intravenous Stopped 7/27/25 0608)   insulin aspart (NovoLOG) injection (RAPID ACTING) (10 Units Subcutaneous $Given 7/27/25 0643)   iopamidol (ISOVUE-370) solution 500 mL (71 mLs Intravenous $Given 7/27/25 0531)   sodium chloride 0.9 % bag for CT scan flush (91 mLs Intravenous $Given 7/27/25 0531)       Drips infusing:  No  For the majority of the shift this patient was Green.   Interventions performed were N/A.    Sepsis treatment initiated: No    Cares/treatment/interventions/medications to be completed following ED care: N/A    ED Nurse Name: Johnson Lanier RN  7:10 AM    RECEIVING UNIT ED HANDOFF REVIEW    Above ED Nurse Handoff Report was reviewed: Yes  Reviewed by: Shasta Mooney RN on July 27, 2025 at 12:53 PM   NEVAEH Deshpande called the ED to inform them the note was read: Yes

## 2025-07-27 NOTE — ED NOTES
"Pt asked if she would like to be cleaned up at all. She states \"no, I just need to shower and I've been too tired to do that\".   "

## 2025-07-27 NOTE — ED NOTES
Spoke to pt's boyfriend, Moe Jose Ramon, [phone]. Pt would like to be called with any updates.

## 2025-07-27 NOTE — PROVIDER NOTIFICATION
Paged Dr. Ramsay that pt is refusing MRI because she said that she is very claustrophobic. Pt mention that she had tried ativan before for an MRI and it did not help.     Dr. Ramsay responded saying that we can hold off for now.

## 2025-07-27 NOTE — ED TRIAGE NOTES
Pt arrives via EMS from home. Nausea, vomiting and diarrhea have started a few hours ago. States she has had numerous falls the past two weeks. States she may have passed out today. When EMS arrived pt was on floor and laying in diarrhea.    BP 73/48 originally. Pt received ~800 of fluids en route. EMS state they had one blood pressure with a MAP of 65 PTA.    . Pt recently restarted metformin.

## 2025-07-27 NOTE — PHARMACY-ADMISSION MEDICATION HISTORY
Pharmacist Admission Medication History    Admission medication history is complete. The information provided in this note is only as accurate as the sources available at the time of the update.    Information Source(s): Patient via in-person  Pertinent Information: none    Changes made to PTA medication list:  Added: cetirizine, duoneb, ondansetron  Deleted: prednisone, insulin aspart, guaifenesin, fluticasone inhaler, azithromycin, duplicate albuterol inhaler  Changed: insulin glargine (40 units --> 20 units), oxycodone 10 mg (q4h PRN --> 5x/day scheduled), metformin XR (1000 mg daily --> 500 mg BID WM)    For patients on insulin therapy:  Do you use sliding scale insulin based on blood sugars? No  Do you typically eat three meals a day? Yes  How many times do you check your blood glucose per day? CGM  How many episodes of hypoglycemia do you typically have per month? none      Allergies reviewed with patient and updates made in EHR: no  Medication History Completed By: William Hudson RPH 7/27/2025 1:41 PM    PTA Med List   Medication Sig Last Dose/Taking    albuterol (PROAIR HFA/PROVENTIL HFA/VENTOLIN HFA) 108 (90 Base) MCG/ACT inhaler Inhale 2 puffs into the lungs every 6 hours as needed for shortness of breath, wheezing or cough. More than a month    cetirizine (ZYRTEC) 10 MG tablet Take 10 mg by mouth every evening. 7/26/2025 Evening    cyclobenzaprine (FLEXERIL) 10 MG tablet Take 10 mg by mouth 3 times daily. 7/26/2025 Noon    Insulin Glargine-yfgn 100 UNIT/ML SOLN Inject 20 Units subcutaneously every evening. 7/26/2025    ipratropium - albuterol 0.5 mg/2.5 mg, 3mg,/3 mL (DUONEB) 0.5-2.5 (3) MG/3ML neb solution Take 1 vial by nebulization every 6 hours as needed for shortness of breath, wheezing or cough. More than a month    levothyroxine (SYNTHROID/LEVOTHROID) 100 MCG tablet Take 1 tablet by mouth daily. 7/26/2025 Morning    metFORMIN (GLUCOPHAGE XR) 500 MG 24 hr tablet Take 500 mg by mouth 2 times  daily (with meals). 7/26/2025 Morning    ondansetron (ZOFRAN ODT) 4 MG ODT tab Take 4 mg by mouth every 12 hours as needed for nausea or vomiting. Past Month    oxyCODONE IR (ROXICODONE) 10 MG tablet Take 10 mg by mouth 5 times daily. 7/26/2025 Noon    rizatriptan (MAXALT) 10 MG tablet Take 10 mg by mouth at onset of headache for migraine. More than a month

## 2025-07-27 NOTE — H&P
"[facility]    History and Physical - Hospitalist Service       Date of Admission:  7/27/2025    Assessment & Plan     Sandra Lilly is a 55 year old female w/PMH IDDMT2, hypothyroidism, anxiety, chronic pain, HTN who presents with LOC, falls and found to have PE    Acute PE  Syncope/LOC  -presents with LOC after developing NV, diarrhea today but has been having frequent falls for a little bit. Noted to be hypotensive by EMS and d dimer markedly elevated. CT head negative  -CTA chest done showing \"Acute pulmonary embolism in the segmental branch of right upper lobe, possibly additional subsegmental upper lobe emboli\" but without evidence of R heart strain, trops normal, EKG sinus  -start on heparin drip for now, anticipate transition to DOAC if no GI bleeding  -check ECHO, monitor on tele, LE dopplers  -suspect this is provoked but could consider hematology eval with below findings as well    NV, diarrhea  Colitis on CT  Probable large volume intrahepatic vein thrombus  -Ct with findings noted above, US ordered by ED  -given antibiotics in ED but hold further for now, seems like diarrhea may not be infectious based on CT  -heparin as above and check stool studies    IDDMT2  -poorly controlled, resume home lantus 40 daily, 10 units TID meals  -hold metformin with diarrhea  -high dose ISS, DM diet    Hx hypothyroidism, HTN, anxiety, chronic pain  -await med rec but resumed synthroid, insulin, inhalers for now         Diet:  diabetic  DVT Prophylaxis: heparin drip  Souza Catheter: Not present  Lines: None     Cardiac Monitoring: None  Code Status:  full code      Disposition Plan     Medically Ready for Discharge: Anticipated in 2-4 Days           Lan Noguera DO  Hospitalist Service  [facility]  Securely message with [employer] (more info)  Text page via [employer] Paging/Directory     ______________________________________________________________________    Chief " Complaint   LOC, falls    History of Present Illness   Sandra Lilly is a 55 year old female w/PMH IDDMT2, hypothyroidism, anxiety, chronic pain, HTN who presents with LOC, falls. She reports over past couple days has been falling and today was found down by her significant other in the bathroom. She had NV and diarrhea today and attributed her LOC to that. Denies any CP but did have some sob, no calf pain or recent long distance travel. BP was initially low in the field but has responded to fluids.    In ED had elevated d dimer and had CTA chest done showing evidence of PE but no strain. Heparin is being started. Had mild leukocytosis but afebrile, no blood in her stools.      Past Medical History    Past Medical History:   Diagnosis Date    Anxiety     Chronic pain     On oxycodone for chronic back pain    Diabetes (H)     Herniated disc     Hypertension     Memory change     PTSD (post-traumatic stress disorder)     Thyroid disease     Uncomplicated asthma        Past Surgical History   No past surgical history on file.    Prior to Admission Medications   Prior to Admission Medications   Prescriptions Last Dose Informant Patient Reported? Taking?   Continuous Glucose  (DEXCOM G6 ) RACQUEL   No No   Sig: Use to read blood sugars as per 's instructions.   Continuous Glucose Sensor (DEXCOM G6 SENSOR) MISC   No No   Sig: Change every 10 days.   Continuous Glucose Transmitter (DEXCOM G6 TRANSMITTER) MISC   No No   Sig: Change every 3 months.   Insulin Glargine-yfgn 100 UNIT/ML SOPN   No No   Sig: Inject 40 Units subcutaneously daily.   albuterol (PROAIR HFA/PROVENTIL HFA/VENTOLIN HFA) 108 (90 Base) MCG/ACT inhaler   No No   Sig: Inhale 1-2 puffs into the lungs every 6 hours as needed for shortness of breath, wheezing or cough.   albuterol (PROAIR HFA/PROVENTIL HFA/VENTOLIN HFA) 108 (90 Base) MCG/ACT inhaler   No No   Sig: Inhale 2 puffs into the lungs every 6 hours as needed for  shortness of breath, wheezing or cough.   azithromycin (ZITHROMAX) 250 MG tablet   No No   Sig: Take 1 tablet (250 mg) by mouth daily.   blood glucose monitoring (NO BRAND SPECIFIED) meter device kit   No No   Sig: Use to test blood sugar four times per day, before each meal and at bedtime times daily or as directed.   cyclobenzaprine (FLEXERIL) 10 MG tablet   Yes No   Sig: Take 10 mg by mouth 3 times daily.   fluticasone (ARNUITY ELLIPTA) 100 MCG/ACT inhaler   No No   Sig: Inhale 1 puff into the lungs daily.   guaiFENesin (ROBITUSSIN) 20 mg/mL liquid   No No   Sig: Take 10 mLs (200 mg) by mouth every 4 hours as needed for cough.   insulin aspart (NOVOLOG PEN) 100 UNIT/ML pen   No No   Sig: Inject 10 Units subcutaneously 3 times daily (with meals).   levothyroxine (SYNTHROID/LEVOTHROID) 100 MCG tablet   Yes No   Sig: Take 1 tablet by mouth daily.   metFORMIN (GLUCOPHAGE XR) 500 MG 24 hr tablet   No No   Sig: Take 2 tablets (1,000 mg) by mouth daily (with dinner).   oxyCODONE IR (ROXICODONE) 10 MG tablet   Yes No   Sig: Take 10 mg by mouth every 4 hours as needed for severe pain.   predniSONE (DELTASONE) 20 MG tablet   No No   Sig: Take 2 tablets (40 mg) by mouth daily.   rizatriptan (MAXALT) 10 MG tablet   Yes No   Sig: Take 10 mg by mouth at onset of headache for migraine.      Facility-Administered Medications: None        Review of Systems    The 10 point Review of Systems is negative other than noted in the HPI or here.     Social History   I have reviewed this patient's social history and updated it with pertinent information if needed.  Social History     Tobacco Use    Smoking status: Every Day   Substance Use Topics    Alcohol use: No         Family History   Family history of cardiac stents but no blood clots      Allergies   Allergies   Allergen Reactions    Other [No Clinical Screening - See Comments]      Flu shot        Physical Exam   Vital Signs: Temp: 98.9  F (37.2  C) Temp src: Oral BP: 129/71  Pulse: 100   Resp: 18 SpO2: 96 % O2 Device: None (Room air)    Weight: 0 lbs 0 oz    Constitutional: awake, alert, and cooperative  Respiratory: no increased work of breathing, good air exchange, and clear to auscultation  Cardiovascular: regular rate and rhythm and no murmur noted  GI: normal bowel sounds, soft, non-distended, and non-tender  Skin: no bruising or bleeding and no pain to palpation of bilateral calves  Neurologic: alert, interactive, no focal deficits    Medical Decision Making       65 MINUTES SPENT BY ME on the date of service doing chart review, history, exam, documentation & further activities per the note.      Data   ------------------------- PAST 24 HR DATA REVIEWED -----------------------------------------------    I have personally reviewed the following data over the past 24 hrs:    12.9 (H)  \   13.4   / 226     139 102 16.0 /  346 (H)   3.8 23 0.67 \     ALT: 15 AST: 18 AP: 141 TBILI: 0.5   ALB: 3.7 TOT PROTEIN: 6.2 (L) LIPASE: 20     Trop: 14 BNP: N/A     Procal: N/A CRP: N/A Lactic Acid: 1.1       INR:  N/A PTT:  N/A   D-dimer:  >20.00 (HH) Fibrinogen:  N/A       Imaging results reviewed over the past 24 hrs:   Recent Results (from the past 24 hours)   Head CT w/o contrast    Narrative    EXAM: CT HEAD W/O CONTRAST  LOCATION: [facility]  DATE: 7/27/2025    INDICATION: syncope  COMPARISON: 6/21/2025.  TECHNIQUE: Routine CT Head without IV contrast. Multiplanar reformats. Dose reduction techniques were used.    FINDINGS:  INTRACRANIAL CONTENTS: No intracranial hemorrhage, extraaxial collection, or mass effect.  No CT evidence of acute infarct. Normal parenchymal attenuation. Normal ventricles and sulci.     VISUALIZED ORBITS/SINUSES/MASTOIDS: No intraorbital abnormality. No paranasal sinus mucosal disease. No middle ear or mastoid effusion.    BONES/SOFT TISSUES: No acute abnormality.      Impression    IMPRESSION:  1.  No CT finding of a mass, hemorrhage or focal  area suggestive of acute infarct.     CT Chest (PE) Abdomen Pelvis w Contrast   Result Value    Radiologist flags (AA)     New diagnosis of pulmonary embolism, portal vein thrombosis    Narrative    EXAM: CT CHEST PE ABDOMEN PELVIS W CONTRAST  LOCATION: [facility]  DATE: 7/27/2025    INDICATION: syncope, abdominal pain, v d  COMPARISON: Abdomen and pelvis CT from 10/19/2018. No prior chest CTs available for review at time of dictation.  TECHNIQUE: CT chest pulmonary angiogram and routine CT abdomen pelvis with IV contrast. Arterial phase through the chest and venous phase through the abdomen and pelvis. Multiplanar reformats and MIP reconstructions were performed. Dose reduction   techniques were used.   CONTRAST: 71mL Isovue 370    FINDINGS:    **The patient was unable to place their arms above their head.  This results in streak artifact and reduces image quality. **    ANGIOGRAM CHEST: Acute pulmonary emboli in the segmental right upper lobe branch (series 9, image 102) and possibly additional smaller subsegmental emboli elsewhere in the right upper and left upper lobes. Normal heart size without pericardial effusion.   Normal LV: RV ratio. Coronary artery disease in the proximal LAD. Normal thoracic aorta without aneurysm or dissection.     LUNGS AND PLEURA: Minimal dependent atelectasis. No pneumonia or pulmonary edema. No pleural effusion or pneumothorax.    MEDIASTINUM/AXILLAE: No enlarged lymph nodes.    HEPATOBILIARY: Hepatomegaly (roughly 21 cm craniocaudal). Although there is excessive streak artifact from the patient's arms, high index of suspicion for thrombosed main and left portal vein with extensive intrahepatic portal vein thrombus in the right   anterior, entire left, and several subsegmental right posterior portal vein branches.    PANCREAS: Normal.    SPLEEN: Normal.    ADRENAL GLANDS: Normal.    KIDNEYS/BLADDER: Normal.    BOWEL: Mild surgical ventral wall thickening the  lower esophagus, possibly from vomiting or GERD. No gastric or bowel distention. Normal appendix. Moderate degree of colitis involving the splenic flexure, descending colon, sigmoid colon, and rectum. This   is manifest with circumferential wall thickening, mucosal hyperenhancement, and faint pericolonic inflammation. The rectal involvement makes colonic ischemia less likely, given dual blood supply of the rectum. Therefore infectious and inflammatory   etiologies (such as ulcerative colitis) are favored. No significant colorectal distention and no significant stool impaction of the involved segments. No pneumoperitoneum, pneumatosis intestinalis, mesenteric venous gas, or portal venous gas.    LYMPH NODES: Normal.    VASCULATURE: Normal appearing SMA and ROCK. While the SMV, IMV, splenic vein, and extrahepatic main portal vein are patent, the left portal vein is not confidently identified. Several suspected filling defects in the anterior and posterior right hepatic   vein are noted, in spite of streak artifact.    PELVIC ORGANS: Normal urinary bladder, uterus, and ovaries.    MUSCULOSKELETAL: No acute or aggressive osseous abnormalities.      Impression    IMPRESSION:  1.  Acute pulmonary embolism in the segmental branch of right upper lobe, possibly additional subsegmental upper lobe emboli.   2.  No CT evidence right heart strain.  3.  Probable large volume intrahepatic portal vein THROMBUS entire left portal vein and multiple segmental and subsegmental intrahepatic branches. Recommend right upper quadrant liver duplex for confirmation. No extrahepatic portal vein, splenic, SMV, or   IMV thrombus.  4.  Moderate degree of proctocolitis involving left hemicolon and rectum. Ordinarily infectious or inflammatory(such as ulcerative colitis) etiologies are favored, but this could alternatively represent recent IMV venous thombosis (which could have   migrated to the intrahepatic portal vein at the time of  imaging).      [Critical Result: New diagnosis of pulmonary embolism, portal vein thrombosis]    Finding was identified on 7/27/2025 6:23 AM CDT.     Dr. Bhatia was contacted by me on 7/27/2025 6:53 AM CDT and verbalized understanding of the critical result.

## 2025-07-27 NOTE — PROGRESS NOTES
"Provided cares for pt from [id number]. Pt a/o x4, forgetful at times. SBA up to commode, pt had incontinent episode of diarrhea prior to transferring to ortho floor, unable to get stool sample. Pt transferred to ortho floor.     [facility]    ED Boarding Nurse Handoff Addendum Report:    Date/time: 7/27/2025, 3:10 PM    Activity Level: assist of 1    Fall Risk: Yes:  nonskid shoes/slippers when out of bed, activity supervised, and room door open    Active Infusions: heparin and fluid bolus       Current care needs: stool sample needed    Oxygen requirements (liters/min and/or FiO2): none    Respiratory status: Room air    Vital signs (within last 30 minutes):    Vitals:    07/27/25 1146 07/27/25 1201 07/27/25 1344 07/27/25 1447   BP:  109/65 131/77 98/68   BP Location:   Right arm Right arm   Patient Position:   Semi-Laguerre's Semi-Laguerre's   Cuff Size:   Adult Regular Adult Regular   Pulse: 99 98 98 107   Resp:    18   Temp:    97.5  F (36.4  C)   TempSrc:    Temporal   SpO2: 98% 98% 97% 96%   Weight:    75.4 kg (166 lb 3.6 oz)   Height:    1.676 m (5' 6\")           ED Boarding Nurse name: Nikkie Macedo RN        "

## 2025-07-27 NOTE — PROGRESS NOTES
Hospitalist Update:    Patient seen in follow-up, admitted earlier this morning by my colleague.  She presented with nausea, vomiting and diarrhea with colitis on CT scan and also noted to have probable large volume intrahepatic vein thrombosis as well as acute pulmonary embolism.  Currently hemodynamically stable but continues to have diarrhea.    Assessment/plan:   -- Checking liver MRI  --Hematology oncology consulted, checking for antiphospholipid antibody syndrome  --Restarting numerous home meds including insulin regimen, home oxycodone and Flexeril continuing heparin drip for now  --remains on heparin gtt  --await echo  --per med rec daily lantus is actually 20 units, updated orders.

## 2025-07-27 NOTE — ED PROVIDER NOTES
Emergency Department Note      History of Present Illness     Chief Complaint   Nausea, Vomiting, & Diarrhea    HPI   Sandra Lilly is a 55 year old female with a history as noted below who presents to the emergency department for nausea, vomiting, diarrhea amongst other symptoms.  She states that within the past 2 weeks, she has fallen 4 times and is concerned that she struck her head multiple times, noting that today, she became syncopal after falling. She adds that just before each of these falls, she has been ambulating but then becomes lightheaded, prompting her to fall. Yesterday she reports developing intractable nonbloody vomiting and nonbloody diarrhea.  She endorses some lower abdominal pain but attributes this to constipation. She reports that she has a hx of type 2 diabetes and has been taking her insulin as prescribed but adds that she has not been checking her BG readings. She adds that today, she took her first dose of her new Metformin prescription, adding that she has taken this medication in the past. No midline neck pain or back pain. No hip pain or extremity pains. No fever or chills. No cough, shortness of breath, or chest pain. No dysuria or hematuria. No ETOH use or drug use. No recent known sick contacts. No new foods. No blood thinner use or antibiotic use. She lives at home with her boyfriend. She ambulates with a cane but also sometimes with a walker at baseline.    EMS states that the patient's initial blood pressure was 73/48 in which she received approximately 800 mL of fluids en route to the ED. BG was 470.    Independent Historian   EMS as detailed above.    Review of External Notes   I reviewed [facility] note 7/24/25    Past Medical History     Medical History and Problem List   Anxiety  Chronic pain  Diabetes   Herniated disc  Hypertension  Memory change  PTSD  Thyroid disease  Uncomplicated asthma    Medications   albuterol (PROAIR HFA/PROVENTIL  HFA/VENTOLIN HFA) 108 (90 Base) MCG/ACT inhaler  azithromycin (ZITHROMAX) 250 MG tablet  cyclobenzaprine (FLEXERIL) 10 MG tablet  fluticasone (ARNUITY ELLIPTA) 100 MCG/ACT inhaler  insulin aspart (NOVOLOG PEN) 100 UNIT/ML pen  Insulin Glargine-yfgn 100 UNIT/ML SOPN  levothyroxine (SYNTHROID/LEVOTHROID) 100 MCG tablet  metFORMIN (GLUCOPHAGE XR) 500 MG 24 hr tablet  oxyCODONE IR (ROXICODONE) 10 MG tablet  predniSONE (DELTASONE) 20 MG tablet  rizatriptan (MAXALT) 10 MG tablet    Surgical History   Laparoscopic supracervical hysterectomy  Lumbar fusion  Vestibule mouth  [place] teeth extraction    Physical Exam     Patient Vitals for the past 24 hrs:   BP Temp Temp src Pulse Resp SpO2   07/27/25 0350 129/71 -- -- 100 -- 96 %   07/27/25 0313 -- 98.9  F (37.2  C) Oral 98 18 94 %   07/27/25 0250 100/60 -- -- -- -- --     Physical Exam  General: Alert. Disheveled  Head:  The scalp, face, and head appear normal without trauma  Eyes:  Sclera white; Pupils are equal and round  ENT:    External ears normal.  No hemotympanum.      External nares normal.  No septal hematoma.    Neck:  No midline tenderness or pain with full ROM.  CV:  Rate as above with regular rhythm   2/2 radial and dorsal pedal pulses  Resp:  Breath sounds clear and equal bilaterally    Non-labored, no retractions or accessory muscle use  GI:  Abdomen soft, mild lower abdominal tenderness  MSK:  No midline tenderness or bony step-off    No deformity    Moves all extremities equally and symmetrically  Skin:  No rash or lesions noted.  Neuro:   No apparent deficit.    Strength 5/5 x4.  Sensation intact x4.     GCS: 15  Psych:  Normal affect.      Diagnostics     Lab Results   Labs Ordered and Resulted from Time of ED Arrival to Time of ED Departure   COMPREHENSIVE METABOLIC PANEL (LIMITED OCCURRENCES) - Abnormal       Result Value    Sodium 139      Potassium 3.8      Carbon Dioxide (CO2) 23      Anion Gap 14      Urea Nitrogen 16.0      Creatinine 0.67      GFR  Estimate >90      Calcium 8.9      Chloride 102      Glucose 414 (*)     Alkaline Phosphatase 141      AST 18      ALT 15      Protein Total 6.2 (*)     Albumin 3.7      Bilirubin Total 0.5     TROPONIN T, HIGH SENSITIVITY - Abnormal    Troponin T, High Sensitivity 15 (*)    D DIMER QUANTITATIVE - Abnormal    D-Dimer Quantitative >20.00 (*)    CBC WITH PLATELETS AND DIFFERENTIAL - Abnormal    WBC Count 12.9 (*)     RBC Count 4.81      Hemoglobin 13.4      Hematocrit 40.4      MCV 84      MCH 27.9      MCHC 33.2      RDW 13.2      Platelet Count 226      % Neutrophils 90      % Lymphocytes 5      % Monocytes 4      % Eosinophils 1      % Basophils 0      % Immature Granulocytes 0      NRBCs per 100 WBC 0      Absolute Neutrophils 11.5 (*)     Absolute Lymphocytes 0.7 (*)     Absolute Monocytes 0.5      Absolute Eosinophils 0.2      Absolute Basophils 0.0      Absolute Immature Granulocytes 0.0      Absolute NRBCs 0.0     ISTAT GASES LACTATE VENOUS POCT - Abnormal    Lactic Acid POCT 2.7 (*)     Bicarbonate Venous POCT 26      O2 Sat, Venous POCT 37 (*)     pCO2 Venous POCT 48      pH Venous POCT 7.34      pO2 Venous POCT 23 (*)    ROUTINE UA WITH MICROSCOPIC REFLEX TO CULTURE - Abnormal    Color Urine Yellow      Appearance Urine Clear      Glucose Urine >=1000 (*)     Bilirubin Urine Negative      Ketones Urine Negative      Specific Gravity Urine 1.035      Blood Urine Negative      pH Urine 5.0      Protein Albumin Urine Negative      Urobilinogen Urine Normal      Nitrite Urine Negative      Leukocyte Esterase Urine Negative      Mucus Urine Present (*)     RBC Urine 1      WBC Urine 1      Squamous Epithelials Urine <1      Hyaline Casts Urine 5 (*)    LIPASE - Normal    Lipase 20     MAGNESIUM (LIMITED OCCURRENCES) - Normal    Magnesium 1.7     INFLUENZA A/B, RSV AND SARS-COV2 PCR - Normal    Influenza A PCR Negative      Influenza B PCR Negative      RSV PCR Negative      SARS CoV2 PCR Negative     KETONE  BETA-HYDROXYBUTYRATE QUANTITATIVE, RAPID - Normal    Ketone (Beta-Hydroxybutyrate) Quantitative 0.24     CK TOTAL - Normal    CK 29     TROPONIN T, HIGH SENSITIVITY   GLUCOSE MONITOR NURSING POCT   LACTIC ACID WHOLE BLOOD     Imaging   CT Chest (PE) Abdomen Pelvis w Contrast   Final Result   Abnormal   IMPRESSION:   1.  Acute pulmonary embolism in the segmental branch of right upper lobe, possibly additional subsegmental upper lobe emboli.    2.  No CT evidence right heart strain.   3.  Probable large volume intrahepatic portal vein THROMBUS entire left portal vein and multiple segmental and subsegmental intrahepatic branches. Recommend right upper quadrant liver duplex for confirmation. No extrahepatic portal vein, splenic, SMV, or    IMV thrombus.   4.  Moderate degree of proctocolitis involving left hemicolon and rectum. Ordinarily infectious or inflammatory(such as ulcerative colitis) etiologies are favored, but this could alternatively represent recent IMV venous thombosis (which could have    migrated to the intrahepatic portal vein at the time of imaging).         [Critical Result: New diagnosis of pulmonary embolism, portal vein thrombosis]      Finding was identified on 7/27/2025 6:23 AM CDT.       Dr. Bhatia was contacted by me on 7/27/2025 6:53 AM CDT and verbalized understanding of the critical result.      Head CT w/o contrast   Final Result   IMPRESSION:   1.  No CT finding of a mass, hemorrhage or focal area suggestive of acute infarct.         US Abdomen Limited w Abd/Pelvis Duplex Complete    (Results Pending)     EKG   ECG results from 07/27/25   EKG 12-lead, tracing only     Value    Systolic Blood Pressure     Diastolic Blood Pressure     Ventricular Rate 97    Atrial Rate 97    KS Interval 136    QRS Duration 88        QTc 454    P Axis 43    R AXIS 56    T Axis 44    Interpretation ECG      Sinus rhythm  Possible Inferior infarct , age undetermined  Poor R-wave progression ; consider  septal infarct, lead placement, or normal variant  Abnormal ECG  When compared with ECG of 21-Jun-2025 09:26,  Nonspecific T wave abnormality now evident in Inferior leads       Independent Interpretation   None    ED Course      Medications Administered   Medications   piperacillin-tazobactam (ZOSYN) 4.5 g vial to attach to  mL bag (has no administration in time range)   heparin 25,000 units in 0.45% NaCl 250 mL ANTICOAGULANT infusion (1,450 Units/hr Intravenous $New Bag 7/27/25 0725)   sodium chloride 0.9% BOLUS 1,000 mL (0 mLs Intravenous Stopped 7/27/25 0608)   sodium chloride 0.9% BOLUS 1,000 mL (0 mLs Intravenous Stopped 7/27/25 0608)   insulin aspart (NovoLOG) injection (RAPID ACTING) (10 Units Subcutaneous $Given 7/27/25 0643)   iopamidol (ISOVUE-370) solution 500 mL (71 mLs Intravenous $Given 7/27/25 0531)   sodium chloride 0.9 % bag for CT scan flush (91 mLs Intravenous $Given 7/27/25 0531)   heparin ANTICOAGULANT Loading dose for HIGH INTENSITY TREATMENT * Give BEFORE starting heparin infusion (6,350 Units Intravenous $Given 7/27/25 0725)   ondansetron (ZOFRAN) injection 4 mg (4 mg Intravenous $Given 7/27/25 0740)     Procedures   Procedures     Discussion of Management   Admitting Hospitalist, Dr. Noguera    ED Course   ED Course as of 07/27/25 0806   Sun Jul 27, 2025   0309 I obtained history and examined the patient as noted above.      0657 I spoke to hospitalist Dr. Noguera     Additional Documentation  None    Medical Decision Making / Diagnosis     CMS Diagnoses: Lactic acid elevated 2/2 to dehydration and possible early sepsis    MIPS   CT for PE was ordered because the patient had an abnormal d-dimer.    DALIA Lilly is a 55 year old female ending with predominant complaints of nausea, vomiting, diarrhea as well as recurrent falls and an episode of syncope.  She was hypotensive by EMS.  She underwent extensive workup during her time in the ED.  Labs with mild leukocytosis.   Lactate elevated on arrival.  Notable hyperglycemia though no evidence to suggest DKA.  She was given aggressive IV fluids.  Also given insulin with downtrending glucose.  She did undergo formal CT which showed concerns for proctocolitis.  D-dimer elevated so she did undergo CT chest as well and there is concern for acute PE.  No right heart strain.  There was concern for intrahepatic portal vein thrombus as well.  Formal right upper quadrant ultrasound liver duplex pending at admission to confirm. She was consented for IV heparin after contraindications reviewed. EKG without STEMI. Mild troponin elevation though no chest pain. She is currently not on supplemental nasal cannula. Patient was covered with IV zosyn, blood cultures sent given concern for possible proctocolitis and cannot exclude early sepsis.  CT head completed as well given recurrent falls vs syncope though without acute process. No obvious trauma on exam. She remained otherwise hemodynamically stable.     Disposition   The patient was admitted to the hospital.     Diagnosis     ICD-10-CM    1. Colitis  K52.9       2. Other acute pulmonary embolism without acute cor pulmonale (H)  I26.99       3. Type 2 diabetes mellitus with hyperglycemia, with long-term current use of insulin (H)  E11.65     Z79.4       4. Nausea vomiting and diarrhea  R11.2     R19.7            Discharge Medications   New Prescriptions    No medications on file     Scribe Disclosure:  Konrad HERRING, am serving as a scribe at 3:16 AM on 7/27/2025 to document services personally performed by Alice Bhatia DO based on my observations and the provider's statements to me.             Alice Bhatia DO  07/27/25 0078

## 2025-07-27 NOTE — CONSULTS
[facility] Consultation      Sandra Lilly MRN# [id number]   YOB: 1970 Age: 55 year old   Date of Admission: 7/27/2025  Requesting physician: Dr Ramsay   Reason for consult: suspected large volume hepatic thrombosis w/ PE            Assessment and Plan:   First lifetime venous thromboembolic event manifesting as provoked segmental/subsegmental right upper and left upper lobe/portal vein DVT  -in the background of CT showing evidence of proctocolitis (likely the provoking insult)  -non-smoker  -not on OCPs;  has had prior hysterectomy in 2015 for endometriosis  -no personal h/o malignancy/inflammatory bowel disease/liver cirrhosis  -family h/o CLL and skin cancers in her father, mother with significant cardiac history including Afib/pacemaker placement, brother with Rheumatoid arthritis   -no family h/o VTE  -CT 7/27/2025 showed acute PE in the segmental right upper lobe branch and possibly additional smaller subsegmental emboli elsewhere in the right upper and left upper lobes.  Normal heart size.  No obvious masses.  Portal vein thrombus.  Moderate degree of proctocolitis involving left hemicolon and rectum.  - Noted ultrasound abdomen Doppler findings.  Liver MRI was recommended to better characterized echogenic left lobe liver lesion and the portal vein thrombosis.  Agree with obtaining liver MRI  - EKG showing normal sinus rhythm; troponin T normal  - Echocardiogram pending  - Will obtain testing for antiphospholipid antibodies.  Labs not suggestive of myeloproliferative neoplasm - would not test for JAK2.  Would not favor further thrombophilia testing given her presentation with first lifetime DVT and negative family history  - Continue heparin GTT for now with plan to transition to transition to DOAC if antiphospholipid antibody testing comes back negative  - Duration of anticoagulation based on results of above testing (minimum 3-6 months, longer based on  results).    Proctocolitis  - Denied preceding fevers, being sick  - No sick contacts  - Shared with me that she started taking metformin on 7/26/2025 morning.  Started having nausea, vomiting along with profuse loose bowel movements beginning at around 1 AM on 7/27/2025.  Unclear if the proctocolitis is related to medication (metformin) use.  No prior history of known inflammatory bowel disease.  Current or prior history of hematochezia etc.  - Hold metformin  -stool for C.diff    Full code     Thank you for the consultation.  We will continue to follow her with you    Speedy Swenson MD   [facility]                Chief Complaint:   Nausea, Vomiting, & Diarrhea           History of Present Illness:   Sandra Lilly is a 55-year-old lady who presented to the ER with history of preceding falls and onset of nausea, vomiting and passing loose multiple watery bowel movements beginning at around 1 AM on 7/27/2025.  She reported starting metformin twice daily dose on 7/26/2025 morning for her known diabetes.  Denied hematochezia or melena.  No known history of inflammatory bowel disease/ulcerative colitis or liver cirrhosis.  Nonalcoholic.  Non-smoker.  Not on birth control pills.  Reports having a hysterectomy around 2016 for endometriosis.    In the ER she underwent a CT chest PE protocol along with CT abdomen and pelvis which showed right upper and left upper lobe PEs along with portal vein thrombosis    No known personal history of malignancies.    Her father had skin cancers and CLL.  Mother had atrial fibrillation and eventually underwent a permanent pacemaker placement.  No family history of VTE.    She used to follow-up with Dr. Chaz Ny.   Now follows with Dr Maurice at [facility].    Past medical history is significant for history of anxiety, hypertension, hypothyroidism, bronchial asthma, sleep apnea, type 2 diabetes mellitus, uterine fibroids, endometriosis, depression.         Physical  Exam:   Vitals were reviewed  Blood pressure 118/75, pulse 103, temperature 98.9  F (37.2  C), temperature source Oral, resp. rate 18, SpO2 96%.  Temperatures:  Current - Temp: 98.9  F (37.2  C); Max - Temp  Av.9  F (37.2  C)  Min: 98.9  F (37.2  C)  Max: 98.9  F (37.2  C)  Respiration range: Resp  Av  Min: 18  Max: 18  Pulse range: Pulse  Av.5  Min: 98  Max: 104  Blood pressure range: Systolic (24hrs), Av , Min:97 , Max:138   ; Diastolic (24hrs), Av, Min:51, Max:81    Pulse oximetry range: SpO2  Av.4 %  Min: 94 %  Max: 100 %    Intake/Output Summary (Last 24 hours) at 2025 1032  Last data filed at 2025 0352  Gross per 24 hour   Intake 800 ml   Output --   Net 800 ml       GENERAL: No acute distress.  SKIN: No rashes or jaundice.  HEENT: No conjunctival pallor or scleral icterus.  BREAST: not examined   LYMPH: No palpable lymphadenopathy in the cervical, supraclavicular, axillary regions.  HEART: Regular rate and rhythm with no murmurs.  LUNGS: Clear bilaterally.  ABDOMEN: diffuse tenderness.  BS(+)  EXTREMITIES: No leg edema.              Past Medical History:   I have reviewed this patient's past medical history  Past Medical History:   Diagnosis Date    Anxiety     Chronic pain     On oxycodone for chronic back pain    Diabetes (H)     Herniated disc     Hypertension     Memory change     PTSD (post-traumatic stress disorder)     Thyroid disease     Uncomplicated asthma              Past Surgical History:   I have reviewed this patient's past surgical history  No past surgical history on file.            Social History:   I have reviewed this patient's social history  Social History     Tobacco Use    Smoking status: Every Day    Smokeless tobacco: Not on file   Substance Use Topics    Alcohol use: No             Family History:   I have reviewed this patient's family history  No family history on file.          Allergies:     Allergies   Allergen Reactions    Other [No  Clinical Screening - See Comments]      Flu shot             Medications:   I have reviewed this patient's current medications  (Not in a hospital admission)    Current Facility-Administered Medications   Medication Dose Route Frequency Provider Last Rate Last Admin    heparin 25,000 units in 0.45% NaCl 250 mL ANTICOAGULANT infusion  0-5,000 Units/hr Intravenous Continuous Alice Bhatia, DO 14.5 mL/hr at 07/27/25 0933 1,450 Units/hr at 07/27/25 0933     Current Outpatient Medications   Medication Sig Dispense Refill    albuterol (PROAIR HFA/PROVENTIL HFA/VENTOLIN HFA) 108 (90 Base) MCG/ACT inhaler Inhale 1-2 puffs into the lungs every 6 hours as needed for shortness of breath, wheezing or cough. 8.5 g 0    albuterol (PROAIR HFA/PROVENTIL HFA/VENTOLIN HFA) 108 (90 Base) MCG/ACT inhaler Inhale 2 puffs into the lungs every 6 hours as needed for shortness of breath, wheezing or cough. 8.5 g 0    azithromycin (ZITHROMAX) 250 MG tablet Take 1 tablet (250 mg) by mouth daily. 2 tablet 0    blood glucose monitoring (NO BRAND SPECIFIED) meter device kit Use to test blood sugar four times per day, before each meal and at bedtime times daily or as directed. 1 kit 0    Continuous Glucose  (DEXCOM G6 ) RACQUEL Use to read blood sugars as per 's instructions. 1 each 0    Continuous Glucose Sensor (DEXCOM G6 SENSOR) MISC Change every 10 days. 3 each 5    Continuous Glucose Transmitter (DEXCOM G6 TRANSMITTER) MISC Change every 3 months. 1 each 1    cyclobenzaprine (FLEXERIL) 10 MG tablet Take 10 mg by mouth 3 times daily.      fluticasone (ARNUITY ELLIPTA) 100 MCG/ACT inhaler Inhale 1 puff into the lungs daily. 1 each 0    guaiFENesin (ROBITUSSIN) 20 mg/mL liquid Take 10 mLs (200 mg) by mouth every 4 hours as needed for cough. 180 mL 0    insulin aspart (NOVOLOG PEN) 100 UNIT/ML pen Inject 10 Units subcutaneously 3 times daily (with meals). 15 mL 0    Insulin Glargine-yfgn 100 UNIT/ML SOPN Inject 40  Units subcutaneously daily. 15 mL 0    levothyroxine (SYNTHROID/LEVOTHROID) 100 MCG tablet Take 1 tablet by mouth daily.      metFORMIN (GLUCOPHAGE XR) 500 MG 24 hr tablet Take 2 tablets (1,000 mg) by mouth daily (with dinner). 60 tablet 0    oxyCODONE IR (ROXICODONE) 10 MG tablet Take 10 mg by mouth every 4 hours as needed for severe pain.      predniSONE (DELTASONE) 20 MG tablet Take 2 tablets (40 mg) by mouth daily. 2 tablet 0    rizatriptan (MAXALT) 10 MG tablet Take 10 mg by mouth at onset of headache for migraine.               Review of Systems:     The 10 point Review of Systems is negative other than noted in the HPI.            Data:   All laboratory data reviewed  Recent Results (from the past 24 hours)   EKG 12-lead, tracing only   Result Value Ref Range    Systolic Blood Pressure  mmHg    Diastolic Blood Pressure  mmHg    Ventricular Rate 97 BPM    Atrial Rate 97 BPM    WI Interval 136 ms    QRS Duration 88 ms     ms    QTc 454 ms    P Axis 43 degrees    R AXIS 56 degrees    T Axis 44 degrees    Interpretation ECG       Sinus rhythm  Possible Inferior infarct , age undetermined  Poor R-wave progression ; consider septal infarct, lead placement, or normal variant  Abnormal ECG  When compared with ECG of 21-Jun-2025 09:26,  Nonspecific T wave abnormality now evident in Inferior leads     iStat Gases (lactate) venous, POCT   Result Value Ref Range    Lactic Acid POCT 2.7 (H) 0.7 - 2.0 mmol/L    Bicarbonate Venous POCT 26 21 - 28 mmol/L    O2 Sat, Venous POCT 37 (L) 70 - 75 %    pCO2 Venous POCT 48 40 - 50 mm Hg    pH Venous POCT 7.34 7.32 - 7.43    pO2 Venous POCT 23 (L) 25 - 47 mm Hg   CBC with Platelets and Differential (Limited Occurrences)    Narrative    The following orders were created for panel order CBC with Platelets and Differential (Limited Occurrences).  Procedure                               Abnormality         Status                     ---------                                -----------         ------                     CBC with platelets and ...[[id number]]  Abnormal            Final result                 Please view results for these tests on the individual orders.   Comprehensive Metabolic Panel (Limited Occurrences)   Result Value Ref Range    Sodium 139 135 - 145 mmol/L    Potassium 3.8 3.4 - 5.3 mmol/L    Carbon Dioxide (CO2) 23 22 - 29 mmol/L    Anion Gap 14 7 - 15 mmol/L    Urea Nitrogen 16.0 6.0 - 20.0 mg/dL    Creatinine 0.67 0.51 - 0.95 mg/dL    GFR Estimate >90 >60 mL/min/1.73m2    Calcium 8.9 8.8 - 10.4 mg/dL    Chloride 102 98 - 107 mmol/L    Glucose 414 (H) 70 - 99 mg/dL    Alkaline Phosphatase 141 40 - 150 U/L    AST 18 0 - 45 U/L    ALT 15 0 - 50 U/L    Protein Total 6.2 (L) 6.4 - 8.3 g/dL    Albumin 3.7 3.5 - 5.2 g/dL    Bilirubin Total 0.5 <=1.2 mg/dL   Lipase   Result Value Ref Range    Lipase 20 13 - 60 U/L   Magnesium (Limited Occurrences)   Result Value Ref Range    Magnesium 1.7 1.7 - 2.3 mg/dL   Ketone Beta-Hydroxybutyrate Quantitative   Result Value Ref Range    Ketone (Beta-Hydroxybutyrate) Quantitative 0.24 <=0.30 mmol/L   Troponin T, High Sensitivity   Result Value Ref Range    Troponin T, High Sensitivity 15 (H) <=14 ng/L   CK total   Result Value Ref Range    CK 29 26 - 192 U/L   D dimer quantitative   Result Value Ref Range    D-Dimer Quantitative >20.00 (HH) 0.00 - 0.50 ug/mL FEU    Narrative    This D-dimer assay is intended for use in conjunction with a clinical pretest probability assessment model to exclude pulmonary embolism (PE) and deep venous thrombosis (DVT) in outpatients suspected of PE or DVT. The cut-off value is 0.50 ug/mL FEU.    For patients 50 years of age or older, the application of age-adjusted cut-off values for D-Dimer may increase the specificity without significant effect on sensitivity. The literature suggested calculation age adjusted cut-off in ug/L = age in years x 10 ug/L. The results in this laboratory are reported as  ug/mL rather than ug/L. The calculation for age adjusted cut off in ug/mL= age in years x 0.01 ug/mL. For example, the cut off for a 76 year old male is 75 x 0.01 ug/mL = 0.76 ug/mL (760 ug/L).    M Vidhya et al. Age adjusted D-dimer cut-off levels to rule out pulmonary embolism: The ADJUST-PE Study. LUX 2014;311:4793-9832.; HJ Lou et al. Diagnostic accuracy of conventional or age adjusted D-dimer cutoff values in older patients with suspected venous thromboembolism. Systemic review and meta-analysis. BMJ 2013:346:f2492.   CBC with platelets and differential   Result Value Ref Range    WBC Count 12.9 (H) 4.0 - 11.0 10e3/uL    RBC Count 4.81 3.80 - 5.20 10e6/uL    Hemoglobin 13.4 11.7 - 15.7 g/dL    Hematocrit 40.4 35.0 - 47.0 %    MCV 84 78 - 100 fL    MCH 27.9 26.5 - 33.0 pg    MCHC 33.2 31.5 - 36.5 g/dL    RDW 13.2 10.0 - 15.0 %    Platelet Count 226 150 - 450 10e3/uL    % Neutrophils 90 %    % Lymphocytes 5 %    % Monocytes 4 %    % Eosinophils 1 %    % Basophils 0 %    % Immature Granulocytes 0 %    NRBCs per 100 WBC 0 <1 /100    Absolute Neutrophils 11.5 (H) 1.6 - 8.3 10e3/uL    Absolute Lymphocytes 0.7 (L) 0.8 - 5.3 10e3/uL    Absolute Monocytes 0.5 0.0 - 1.3 10e3/uL    Absolute Eosinophils 0.2 0.0 - 0.7 10e3/uL    Absolute Basophils 0.0 0.0 - 0.2 10e3/uL    Absolute Immature Granulocytes 0.0 <=0.4 10e3/uL    Absolute NRBCs 0.0 10e3/uL   Influenza A/B, RSV and SARS-CoV2 PCR (COVID-19) Nasopharyngeal    Specimen: Nasopharyngeal; Swab   Result Value Ref Range    Influenza A PCR Negative Negative    Influenza B PCR Negative Negative    RSV PCR Negative Negative    SARS CoV2 PCR Negative Negative    Narrative    Testing was performed using the Xpert Xpress CoV2/Flu/RSV Assay on the [employer] Instrument. This test should be ordered for the detection of SARS-CoV2, influenza, and RSV viruses in individuals with signs and symptoms of respiratory tract infection. This test is for in vitro diagnostic use  under the [place] FDA for laboratories certified under CLIA to perform high or moderate complexity testing. This test has been [place] FDA cleared. A negative result does not rule out the presence of PCR inhibitors in the specimen or target RNA in concentration below the limit of detection for the assay. If only one viral target is positive but coinfection with multiple targets is suspected, the sample should be re-tested with another FDA cleared, approved, or authorized test, if coninfection would change clinical management. This test was validated by the [facility] and [facility]. These laboratories are certified under the Clinical Laboratory Improvement Amendments of 1988 (CLIA-88) as qualified to perfom high complexity laboratory testing.   UA with Microscopic reflex to Culture    Specimen: Urine, Catheter   Result Value Ref Range    Color Urine Yellow Colorless, Straw, Light Yellow, Yellow    Appearance Urine Clear Clear    Glucose Urine >=1000 (A) Negative mg/dL    Bilirubin Urine Negative Negative    Ketones Urine Negative Negative mg/dL    Specific Gravity Urine 1.035 1.003 - 1.035    Blood Urine Negative Negative    pH Urine 5.0 5.0 - 7.0    Protein Albumin Urine Negative Negative mg/dL    Urobilinogen Urine Normal Normal mg/dL    Nitrite Urine Negative Negative    Leukocyte Esterase Urine Negative Negative    Mucus Urine Present (A) None Seen /LPF    RBC Urine 1 <=2 /HPF    WBC Urine 1 <=5 /HPF    Squamous Epithelials Urine <1 <=1 /HPF    Hyaline Casts Urine 5 (H) <=2 /LPF    Narrative    Urine Culture not indicated   Glucose by meter   Result Value Ref Range    GLUCOSE BY METER POCT 346 (H) 70 - 99 mg/dL   Head CT w/o contrast    Narrative    EXAM: CT HEAD W/O CONTRAST  LOCATION: [facility]  DATE: 7/27/2025    INDICATION: syncope  COMPARISON: 6/21/2025.  TECHNIQUE: Routine CT Head without IV contrast. Multiplanar reformats. Dose reduction techniques were used.    FINDINGS:  INTRACRANIAL  CONTENTS: No intracranial hemorrhage, extraaxial collection, or mass effect.  No CT evidence of acute infarct. Normal parenchymal attenuation. Normal ventricles and sulci.     VISUALIZED ORBITS/SINUSES/MASTOIDS: No intraorbital abnormality. No paranasal sinus mucosal disease. No middle ear or mastoid effusion.    BONES/SOFT TISSUES: No acute abnormality.      Impression    IMPRESSION:  1.  No CT finding of a mass, hemorrhage or focal area suggestive of acute infarct.     CT Chest (PE) Abdomen Pelvis w Contrast   Result Value Ref Range    Radiologist flags (AA)      New diagnosis of pulmonary embolism, portal vein thrombosis    Narrative    EXAM: CT CHEST PE ABDOMEN PELVIS W CONTRAST  LOCATION: [facility]  DATE: 7/27/2025    INDICATION: syncope, abdominal pain, v d  COMPARISON: Abdomen and pelvis CT from 10/19/2018. No prior chest CTs available for review at time of dictation.  TECHNIQUE: CT chest pulmonary angiogram and routine CT abdomen pelvis with IV contrast. Arterial phase through the chest and venous phase through the abdomen and pelvis. Multiplanar reformats and MIP reconstructions were performed. Dose reduction   techniques were used.   CONTRAST: 71mL Isovue 370    FINDINGS:    **The patient was unable to place their arms above their head.  This results in streak artifact and reduces image quality. **    ANGIOGRAM CHEST: Acute pulmonary emboli in the segmental right upper lobe branch (series 9, image 102) and possibly additional smaller subsegmental emboli elsewhere in the right upper and left upper lobes. Normal heart size without pericardial effusion.   Normal LV: RV ratio. Coronary artery disease in the proximal LAD. Normal thoracic aorta without aneurysm or dissection.     LUNGS AND PLEURA: Minimal dependent atelectasis. No pneumonia or pulmonary edema. No pleural effusion or pneumothorax.    MEDIASTINUM/AXILLAE: No enlarged lymph nodes.    HEPATOBILIARY: Hepatomegaly (roughly 21  cm craniocaudal). Although there is excessive streak artifact from the patient's arms, high index of suspicion for thrombosed main and left portal vein with extensive intrahepatic portal vein thrombus in the right   anterior, entire left, and several subsegmental right posterior portal vein branches.    PANCREAS: Normal.    SPLEEN: Normal.    ADRENAL GLANDS: Normal.    KIDNEYS/BLADDER: Normal.    BOWEL: Mild surgical ventral wall thickening the lower esophagus, possibly from vomiting or GERD. No gastric or bowel distention. Normal appendix. Moderate degree of colitis involving the splenic flexure, descending colon, sigmoid colon, and rectum. This   is manifest with circumferential wall thickening, mucosal hyperenhancement, and faint pericolonic inflammation. The rectal involvement makes colonic ischemia less likely, given dual blood supply of the rectum. Therefore infectious and inflammatory   etiologies (such as ulcerative colitis) are favored. No significant colorectal distention and no significant stool impaction of the involved segments. No pneumoperitoneum, pneumatosis intestinalis, mesenteric venous gas, or portal venous gas.    LYMPH NODES: Normal.    VASCULATURE: Normal appearing SMA and ROCK. While the SMV, IMV, splenic vein, and extrahepatic main portal vein are patent, the left portal vein is not confidently identified. Several suspected filling defects in the anterior and posterior right hepatic   vein are noted, in spite of streak artifact.    PELVIC ORGANS: Normal urinary bladder, uterus, and ovaries.    MUSCULOSKELETAL: No acute or aggressive osseous abnormalities.      Impression    IMPRESSION:  1.  Acute pulmonary embolism in the segmental branch of right upper lobe, possibly additional subsegmental upper lobe emboli.   2.  No CT evidence right heart strain.  3.  Probable large volume intrahepatic portal vein THROMBUS entire left portal vein and multiple segmental and subsegmental intrahepatic  branches. Recommend right upper quadrant liver duplex for confirmation. No extrahepatic portal vein, splenic, SMV, or   IMV thrombus.  4.  Moderate degree of proctocolitis involving left hemicolon and rectum. Ordinarily infectious or inflammatory(such as ulcerative colitis) etiologies are favored, but this could alternatively represent recent IMV venous thombosis (which could have   migrated to the intrahepatic portal vein at the time of imaging).      [Critical Result: New diagnosis of pulmonary embolism, portal vein thrombosis]    Finding was identified on 7/27/2025 6:23 AM CDT.     Dr. Bhatia was contacted by me on 7/27/2025 6:53 AM CDT and verbalized understanding of the critical result.   Troponin T, High Sensitivity   Result Value Ref Range    Troponin T, High Sensitivity 14 <=14 ng/L   Lactic Acid Whole Blood with 1X Repeat in 2 HR when >2   Result Value Ref Range    Lactic Acid, Initial 1.1 0.7 - 2.0 mmol/L   NT-proBNP   Result Value Ref Range    NT-proBNP <36 0 - 226 pg/mL   Glucose by meter   Result Value Ref Range    GLUCOSE BY METER POCT 321 (H) 70 - 99 mg/dL   CBC with platelets   Result Value Ref Range    WBC Count 12.4 (H) 4.0 - 11.0 10e3/uL    RBC Count 4.62 3.80 - 5.20 10e6/uL    Hemoglobin 12.9 11.7 - 15.7 g/dL    Hematocrit 37.9 35.0 - 47.0 %    MCV 82 78 - 100 fL    MCH 27.9 26.5 - 33.0 pg    MCHC 34.0 31.5 - 36.5 g/dL    RDW 13.4 10.0 - 15.0 %    Platelet Count 221 150 - 450 10e3/uL   US Abdomen Limited w Abd/Pelvis Duplex Complete    Narrative    EXAM: US ABDOMEN LIMITED WITH DOPPLER COMPLETE  LOCATION: [facility]  DATE: 7/27/2025    INDICATION: portal vein thrombosis. Abdominal pain.  COMPARISON: 7/27/2025, 10/19/2018 CT scans.  TECHNIQUE: Complete abdominal ultrasound. Color flow with spectral Doppler and waveform analysis performed.     FINDINGS:    GALLBLADDER: Normal. No gallstones, wall thickening, or pericholecystic fluid. Negative sonographic Otero's sign.      BILE DUCTS: No biliary dilatation. The common duct measures 4 mm.    LIVER: Mild diffuse fatty infiltration with smooth contour. 13 x 9 mm echogenic lesion left lobe suggestive of probable benign hemangioma which, however poorly visualized on earlier venous phase CT, so technically indeterminate.     RIGHT KIDNEY: Normal size. Normal echogenicity with no hydronephrosis or mass.     LEFT KIDNEY: Normal size. Normal echogenicity with no hydronephrosis or mass.    SPLEEN: Normal.    PANCREAS: The visualized portions are normal.    AORTA: Normal in caliber.    IVC: Normal where visualized.    No ascites.    ABDOMINAL DUPLEX: Main and right portal veins are patent with appropriate flow direction. Portion of the central portal vein is patent however thrombus involving segmental/subsegmental left portal vein branches on earlier CT are poorly visualized   sonographically. Probable small amount of nonocclusive thrombus noted near the confluence of left portal/distal main portal vein.    Patent hepatic veins with appropriate flow direction and respiratory phasicity. Normal antegrade hepatic artery waveform with velocity 61 cm/s. Splenic vein patent.      Impression    IMPRESSION:  1.  Mild hepatic steatosis.  2.  Small echogenic left lobe lesion, favor benign hemangioma but poorly visualized on earlier CT so technically indeterminate.  3.   Intrahepatic left portal vein thrombus involving segmental/subsegmental left branches on earlier CT is poorly visualized sonographically. Probable small amount of nonocclusive thrombus near the confluence of left portal/distal main portal vein.  4.  Main and right portal veins patent with appropriate flow direction.  2.  Recommend liver MRI to better assess intrahepatic left portal vein thrombus and characterize small left lobe lesion.  1.

## 2025-07-28 LAB
ANION GAP SERPL CALCULATED.3IONS-SCNC: 12 MMOL/L (ref 7–15)
ATRIAL RATE - MUSE: 97 BPM
BUN SERPL-MCNC: 7.8 MG/DL (ref 6–20)
CALCIUM SERPL-MCNC: 8.1 MG/DL (ref 8.8–10.4)
CHLORIDE SERPL-SCNC: 113 MMOL/L (ref 98–107)
CREAT SERPL-MCNC: 0.52 MG/DL (ref 0.51–0.95)
DIASTOLIC BLOOD PRESSURE - MUSE: NORMAL MMHG
EGFRCR SERPLBLD CKD-EPI 2021: >90 ML/MIN/1.73M2
ERYTHROCYTE [DISTWIDTH] IN BLOOD BY AUTOMATED COUNT: 13.4 % (ref 10–15)
GLUCOSE BLDC GLUCOMTR-MCNC: 105 MG/DL (ref 70–99)
GLUCOSE BLDC GLUCOMTR-MCNC: 113 MG/DL (ref 70–99)
GLUCOSE BLDC GLUCOMTR-MCNC: 145 MG/DL (ref 70–99)
GLUCOSE BLDC GLUCOMTR-MCNC: 166 MG/DL (ref 70–99)
GLUCOSE SERPL-MCNC: 108 MG/DL (ref 70–99)
HCO3 SERPL-SCNC: 20 MMOL/L (ref 22–29)
HCT VFR BLD AUTO: 34.3 % (ref 35–47)
HGB BLD-MCNC: 11.5 G/DL (ref 11.7–15.7)
INTERPRETATION ECG - MUSE: NORMAL
LVEF ECHO: NORMAL
MCH RBC QN AUTO: 28.3 PG (ref 26.5–33)
MCHC RBC AUTO-ENTMCNC: 33.5 G/DL (ref 31.5–36.5)
MCV RBC AUTO: 84 FL (ref 78–100)
P AXIS - MUSE: 43 DEGREES
PLATELET # BLD AUTO: 200 10E3/UL (ref 150–450)
POTASSIUM SERPL-SCNC: 2.9 MMOL/L (ref 3.4–5.3)
POTASSIUM SERPL-SCNC: 4.6 MMOL/L (ref 3.4–5.3)
PR INTERVAL - MUSE: 136 MS
QRS DURATION - MUSE: 88 MS
QT - MUSE: 358 MS
QTC - MUSE: 454 MS
R AXIS - MUSE: 56 DEGREES
RBC # BLD AUTO: 4.07 10E6/UL (ref 3.8–5.2)
SODIUM SERPL-SCNC: 145 MMOL/L (ref 135–145)
SYSTOLIC BLOOD PRESSURE - MUSE: NORMAL MMHG
T AXIS - MUSE: 44 DEGREES
UFH PPP CHRO-ACNC: 0.51 IU/ML (ref ?–1.1)
UFH PPP CHRO-ACNC: 0.54 IU/ML (ref ?–1.1)
VENTRICULAR RATE- MUSE: 97 BPM
WBC # BLD AUTO: 9 10E3/UL (ref 4–11)

## 2025-07-28 PROCEDURE — 250N000013 HC RX MED GY IP 250 OP 250 PS 637: Performed by: HOSPITALIST

## 2025-07-28 PROCEDURE — 250N000011 HC RX IP 250 OP 636: Performed by: HOSPITALIST

## 2025-07-28 PROCEDURE — 250N000013 HC RX MED GY IP 250 OP 250 PS 637: Performed by: INTERNAL MEDICINE

## 2025-07-28 PROCEDURE — 36416 COLLJ CAPILLARY BLOOD SPEC: CPT | Performed by: HOSPITALIST

## 2025-07-28 PROCEDURE — 85520 HEPARIN ASSAY: CPT | Performed by: HOSPITALIST

## 2025-07-28 PROCEDURE — 250N000011 HC RX IP 250 OP 636: Performed by: INTERNAL MEDICINE

## 2025-07-28 PROCEDURE — 36415 COLL VENOUS BLD VENIPUNCTURE: CPT | Performed by: HOSPITALIST

## 2025-07-28 PROCEDURE — 82310 ASSAY OF CALCIUM: CPT | Performed by: HOSPITALIST

## 2025-07-28 PROCEDURE — 120N000001 HC R&B MED SURG/OB

## 2025-07-28 PROCEDURE — 84132 ASSAY OF SERUM POTASSIUM: CPT | Performed by: HOSPITALIST

## 2025-07-28 PROCEDURE — 85027 COMPLETE CBC AUTOMATED: CPT | Performed by: HOSPITALIST

## 2025-07-28 PROCEDURE — 99233 SBSQ HOSP IP/OBS HIGH 50: CPT | Performed by: HOSPITALIST

## 2025-07-28 RX ORDER — NALOXONE HYDROCHLORIDE 0.4 MG/ML
0.4 INJECTION, SOLUTION INTRAMUSCULAR; INTRAVENOUS; SUBCUTANEOUS
Status: DISCONTINUED | OUTPATIENT
Start: 2025-07-28 | End: 2025-07-30 | Stop reason: HOSPADM

## 2025-07-28 RX ORDER — RIZATRIPTAN BENZOATE 5 MG/1
10 TABLET, ORALLY DISINTEGRATING ORAL
Status: COMPLETED | OUTPATIENT
Start: 2025-07-28 | End: 2025-07-28

## 2025-07-28 RX ORDER — NALOXONE HYDROCHLORIDE 0.4 MG/ML
0.2 INJECTION, SOLUTION INTRAMUSCULAR; INTRAVENOUS; SUBCUTANEOUS
Status: DISCONTINUED | OUTPATIENT
Start: 2025-07-28 | End: 2025-07-30 | Stop reason: HOSPADM

## 2025-07-28 RX ORDER — CETIRIZINE HYDROCHLORIDE 10 MG/1
10 TABLET ORAL EVERY EVENING
Status: DISCONTINUED | OUTPATIENT
Start: 2025-07-28 | End: 2025-07-30 | Stop reason: HOSPADM

## 2025-07-28 RX ORDER — POTASSIUM CHLORIDE 1500 MG/1
20 TABLET, EXTENDED RELEASE ORAL ONCE
Status: COMPLETED | OUTPATIENT
Start: 2025-07-28 | End: 2025-07-28

## 2025-07-28 RX ORDER — POTASSIUM CHLORIDE 1500 MG/1
40 TABLET, EXTENDED RELEASE ORAL ONCE
Status: COMPLETED | OUTPATIENT
Start: 2025-07-28 | End: 2025-07-28

## 2025-07-28 RX ADMIN — ALBUTEROL SULFATE 2 PUFF: 90 AEROSOL, METERED RESPIRATORY (INHALATION) at 09:29

## 2025-07-28 RX ADMIN — ONDANSETRON 4 MG: 4 TABLET, ORALLY DISINTEGRATING ORAL at 12:16

## 2025-07-28 RX ADMIN — CETIRIZINE HYDROCHLORIDE 10 MG: 10 TABLET, FILM COATED ORAL at 19:06

## 2025-07-28 RX ADMIN — CYCLOBENZAPRINE 10 MG: 10 TABLET, FILM COATED ORAL at 13:09

## 2025-07-28 RX ADMIN — HEPARIN SODIUM 1350 UNITS/HR: 10000 INJECTION, SOLUTION INTRAVENOUS at 16:36

## 2025-07-28 RX ADMIN — POTASSIUM CHLORIDE 20 MEQ: 1500 TABLET, EXTENDED RELEASE ORAL at 10:36

## 2025-07-28 RX ADMIN — CYCLOBENZAPRINE 10 MG: 10 TABLET, FILM COATED ORAL at 19:06

## 2025-07-28 RX ADMIN — ONDANSETRON 4 MG: 4 TABLET, ORALLY DISINTEGRATING ORAL at 20:18

## 2025-07-28 RX ADMIN — RIZATRIPTAN BENZOATE 10 MG: 5 TABLET, ORALLY DISINTEGRATING ORAL at 13:25

## 2025-07-28 RX ADMIN — OXYCODONE HYDROCHLORIDE 10 MG: 10 TABLET ORAL at 17:24

## 2025-07-28 RX ADMIN — ALBUTEROL SULFATE 2 PUFF: 90 AEROSOL, METERED RESPIRATORY (INHALATION) at 18:55

## 2025-07-28 RX ADMIN — OXYCODONE HYDROCHLORIDE 10 MG: 10 TABLET ORAL at 08:34

## 2025-07-28 RX ADMIN — CYCLOBENZAPRINE 10 MG: 10 TABLET, FILM COATED ORAL at 08:35

## 2025-07-28 RX ADMIN — LEVOTHYROXINE SODIUM 100 MCG: 0.1 TABLET ORAL at 21:57

## 2025-07-28 RX ADMIN — POTASSIUM CHLORIDE 40 MEQ: 1500 TABLET, EXTENDED RELEASE ORAL at 08:35

## 2025-07-28 RX ADMIN — INSULIN GLARGINE 20 UNITS: 100 INJECTION, SOLUTION SUBCUTANEOUS at 21:57

## 2025-07-28 ASSESSMENT — ACTIVITIES OF DAILY LIVING (ADL)
ADLS_ACUITY_SCORE: 67
ADLS_ACUITY_SCORE: 65
ADLS_ACUITY_SCORE: 66
ADLS_ACUITY_SCORE: 67
ADLS_ACUITY_SCORE: 65
ADLS_ACUITY_SCORE: 66
DEPENDENT_IADLS:: INDEPENDENT
ADLS_ACUITY_SCORE: 67
ADLS_ACUITY_SCORE: 66
ADLS_ACUITY_SCORE: 67
ADLS_ACUITY_SCORE: 65
ADLS_ACUITY_SCORE: 67
ADLS_ACUITY_SCORE: 67
ADLS_ACUITY_SCORE: 66
ADLS_ACUITY_SCORE: 65
ADLS_ACUITY_SCORE: 67
ADLS_ACUITY_SCORE: 66
ADLS_ACUITY_SCORE: 67
ADLS_ACUITY_SCORE: 63
ADLS_ACUITY_SCORE: 66
ADLS_ACUITY_SCORE: 65

## 2025-07-28 NOTE — PLAN OF CARE
"Goal Outcome Evaluation:      Plan of Care Reviewed With: patient    Overall Patient Progress: no changeOverall Patient Progress: no change    Outcome Evaluation: Pt is A&Ox4. Forgetful. VSS on RA. LS Clear. No SOB. Denies nausea. Bowel sounds hyperactive. Loose stools x2. Tolerating PO. Modcarb diet. Assist x1 w/g, but refuses walker. Refuses to comb hair which is all tangled. Ambulating. Voiding to the bathroom. PIV R forearm running heparin @ 1350. PIV L forearm SL. Glucose checks. Pain controlled with scheduled regimen and PRN oxy. CMS: intact. Tele: SR HR: 91. Discharge plans TBD      Problem: Adult Inpatient Plan of Care  Goal: Plan of Care Review  Description: The Plan of Care Review/Shift note should be completed every shift.  The Outcome Evaluation is a brief statement about your assessment that the patient is improving, declining, or no change.  This information will be displayed automatically on your shift  note.  Outcome: Progressing  Flowsheets (Taken 7/28/2025 0245)  Outcome Evaluation: Pt is A&Ox4. Forgetful. VSS on RA. LS Clear. No SOB. Denies nausea. Bowel sounds hyperactive. Loose stools x2. Tolerating PO. Modcarb diet. Assist x1 w/g, but refuses walker. Refuses to comb hair which is all tangled. Ambulating. Voiding to the bathroom. PIV R forearm running heparin @ 1350. PIV L forearm SL. Glucose checks. Pain controlled with scheduled regimen and PRN oxy. CMS: intact. Tele: SR HR: 91. Discharge plans TBD  Plan of Care Reviewed With: patient  Overall Patient Progress: no change  Goal: Patient-Specific Goal (Individualized)  Description: You can add care plan individualizations to a care plan. Examples of Individualization might be:  \"Parent requests to be called daily at 9am for status\", \"I have a hard time hearing out of my right ear\", or \"Do not touch me to wake me up as it startles  me\".  Outcome: Progressing  Goal: Absence of Hospital-Acquired Illness or Injury  Outcome: " Progressing  Intervention: Identify and Manage Fall Risk  Recent Flowsheet Documentation  Taken 7/27/2025 2035 by James Cherry RN  Safety Promotion/Fall Prevention:   activity supervised   assistive device/personal items within reach   clutter free environment maintained   increased rounding and observation   nonskid shoes/slippers when out of bed   room door open   room near nurse's station   room organization consistent   safety round/check completed   supervised activity  Intervention: Prevent Skin Injury  Recent Flowsheet Documentation  Taken 7/27/2025 2212 by James Cherry RN  Body Position: position changed independently  Taken 7/27/2025 2035 by James Cherry RN  Body Position: position changed independently  Intervention: Prevent and Manage VTE (Venous Thromboembolism) Risk  Recent Flowsheet Documentation  Taken 7/27/2025 2035 by James Cherry RN  VTE Prevention/Management: SCDs on (sequential compression devices)  Intervention: Prevent Infection  Recent Flowsheet Documentation  Taken 7/27/2025 2035 by James Cherry RN  Infection Prevention:   rest/sleep promoted   personal protective equipment utilized   hand hygiene promoted  Goal: Optimal Comfort and Wellbeing  Outcome: Progressing  Intervention: Monitor Pain and Promote Comfort  Recent Flowsheet Documentation  Taken 7/27/2025 2355 by James Cherry RN  Pain Management Interventions: rest  Taken 7/27/2025 2313 by James Cherry RN  Pain Management Interventions: medication (see MAR)  Taken 7/27/2025 2035 by James Cherry RN  Pain Management Interventions: medication (see MAR)  Goal: Readiness for Transition of Care  Outcome: Progressing     Problem: Comorbidity Management  Goal: Maintenance of Asthma Control  Outcome: Progressing  Intervention: Maintain Asthma Symptom Control  Recent Flowsheet Documentation  Taken 7/27/2025 2035 by James Cherry RN  Medication Review/Management: medications  reviewed  Goal: Blood Glucose Levels Within Targeted Range  Outcome: Progressing  Intervention: Monitor and Manage Glycemia  Recent Flowsheet Documentation  Taken 7/27/2025 2035 by James Cherry, RN  Medication Review/Management: medications reviewed     Problem: Fall Injury Risk  Goal: Absence of Fall and Fall-Related Injury  Outcome: Progressing  Intervention: Identify and Manage Contributors  Recent Flowsheet Documentation  Taken 7/27/2025 2035 by James Cherry, RN  Medication Review/Management: medications reviewed  Intervention: Promote Injury-Free Environment  Recent Flowsheet Documentation  Taken 7/27/2025 2035 by James Cherry, RN  Safety Promotion/Fall Prevention:   activity supervised   assistive device/personal items within reach   clutter free environment maintained   increased rounding and observation   nonskid shoes/slippers when out of bed   room door open   room near nurse's station   room organization consistent   safety round/check completed   supervised activity

## 2025-07-28 NOTE — PROGRESS NOTES
"[facility]    Hospitalist Progress Note      Assessment & Plan   Sandra Lilly is a 55 year old female w/PMH IDDMT2, hypothyroidism, anxiety, chronic pain, HTN who presents with LOC, falls and found to have PE.     #Acute PE.  Right femoral vein DVT.  Syncope/LOC: Presents with LOC after developing NV, diarrhea today but has been having frequent falls for a little bit. Noted to be hypotensive by EMS and d dimer markedly elevated. CT head negative.  CTA chest done showing \"Acute pulmonary embolism in the segmental branch of right upper lobe, possibly additional subsegmental upper lobe emboli\" but without evidence of R heart strain, trops normal, EKG sinus.  Lower extremity doppler showed nonocclusive DVT of right femoral vein.    -TTE ordered but not yet done.  Await results.    -Started on hep gtt.  Continue pending antiphospholipid antibodies.  Likely needs minimum of 3-6 months of treatment.  Hematology consulted.       #NV, diarrhea.  Colitis on CT: Pt with diarrheal symptoms without blood.  Likely leading to dehydration and weakness.  Did start taking metformin day prior to loose stools starting on AM of 7/27 (day of admission).  Enteric panel negative.  C diff ordered but will need to be collected.  If c diff negative can use imodium.     #Large volume intrahepatic vein thrombus: Noted on CT scan.  US doppler showed small echogenic left lobe lesion likely benign hemangioma.  Intrahepatic left portal vein thrombus poorly visualized on US with probable small amount of nonocclusive thrombus near left portal/distal main portal veins.  Recommendation for liver MRI.  -Patient is not able to tolerate MRI of the abdomen due to severe anxiety.  She states that she needs an open MRI in order to have this done.  Unfortunately, we do not have this available at [facility].  This will need to be done as outpatient.  -She will be on anticoagulation as above.    #Generalized weakness: Patient with " multiple falls at home with generalized weakness and deconditioning.  She tells me that she recently had a primary care appointment where she could not even get to her car without assistance.  We will need to have physical therapy evaluate.  She may need placement.  Fall precautions.     #IDDM2: Resumed her home Lantus 20 units nightly along with sliding scale insulin.     #Hx hypothyroidism, chronic pain: Resumed her levothyroxine.  Resumed her home oxycodone along with Flexeril    DVT Prophylaxis: as above  Code Status: Full Code  Medically Ready for Discharge: Anticipated in 2-4 Days    BrotherTruong, called on 7/28 but went to [facility].       Jan Krishnan MD    Interval History   Assumed care.  Patient feels generally weak and deconditioned.  No chest pain.  She had ongoing diarrheal symptoms this morning.  No blood noted.  No vomiting.  Discussed plan of care as above.  She tells me that she has been generally weak and deconditioned for some time and needs significant assistance.    -Data reviewed today: I reviewed all new labs and imaging results over the last 24 hours. I personally reviewed     Physical Exam   Temp: 97.5  F (36.4  C) Temp src: Temporal BP: 114/71 Pulse: 81   Resp: 16 SpO2: 94 % O2 Device: None (Room air)    Vitals:    07/27/25 1447   Weight: 75.4 kg (166 lb 3.6 oz)     Vital Signs with Ranges  Temp:  [97.5  F (36.4  C)-99  F (37.2  C)] 97.5  F (36.4  C)  Pulse:  [] 81  Resp:  [16-18] 16  BP: ()/(61-77) 114/71  SpO2:  [94 %-99 %] 94 %  No intake/output data recorded.    Constitutional: Generally deconditioned.  No acute distress.  She appears older than stated age.  HEENT: Normocephalic. MMM, No elevation of JVD noted.   Respiratory: Nl WOB, Clear bilaterally, No wheezes or crackles  Cardiovascular: Regular, no murmur  GI: Soft, BS+, NT, ND  Skin/Integumen: WWP, no rash. No edema  Neuro: CNII-XII intact. Moves all extremities. No tremor. A&Ox3.    Medications   Current  Facility-Administered Medications   Medication Dose Route Frequency Provider Last Rate Last Admin    heparin 25,000 units in 0.45% NaCl 250 mL ANTICOAGULANT infusion  0-5,000 Units/hr Intravenous Continuous Mika Ramsay MD 13.5 mL/hr at 07/28/25 0839 1,350 Units/hr at 07/28/25 0839    Patient is already receiving anticoagulation with heparin, enoxaparin (LOVENOX), warfarin (COUMADIN)  or other anticoagulant medication   Does not apply Continuous PRN Lan Noguera DO         Current Facility-Administered Medications   Medication Dose Route Frequency Provider Last Rate Last Admin    cyclobenzaprine (FLEXERIL) tablet 10 mg  10 mg Oral TID Mika Ramsay MD   10 mg at 07/28/25 0835    insulin aspart (NovoLOG) injection (RAPID ACTING)  1-10 Units Subcutaneous TID  Lan Noguera DO   3 Units at 07/27/25 1819    insulin aspart (NovoLOG) injection (RAPID ACTING)  1-7 Units Subcutaneous At Bedtime Lan Noguera DO        insulin glargine (LANTUS PEN) injection 20 Units  20 Units Subcutaneous At Bedtime Mika Ramsay MD   20 Units at 07/27/25 2314    levothyroxine (SYNTHROID/LEVOTHROID) tablet 100 mcg  100 mcg Oral Daily Lan Noguera DO   100 mcg at 07/27/25 2313    potassium chloride george ER (KLOR-CON M20) CR tablet 20 mEq  20 mEq Oral Once Jan Krishnan MD        sodium chloride (PF) 0.9% PF flush 3 mL  3 mL Intracatheter Q8H [facility] Lan Noguera DO           Data   Recent Labs   Lab 07/28/25  0620 07/28/25  0146 07/27/25  2259 07/27/25  1501 07/27/25  0800 07/27/25  0557 07/27/25  0329   WBC 9.0  --   --   --  12.4*  --  12.9*   HGB 11.5*  --   --   --  12.9  --  13.4   MCV 84  --   --   --  82  --  84     --   --   --  221  --  226     --   --   --   --   --  139   POTASSIUM 2.9*  --   --   --   --   --  3.8   CHLORIDE 113*  --   --   --   --   --  102   CO2 20*  --   --   --   --   --  23   BUN 7.8  --   --   --   --   --  16.0   CR 0.52  --   --    --   --   --  0.67   ANIONGAP 12  --   --   --   --   --  14   SHARON 8.1*  --   --   --   --   --  8.9   * 145* 167*   < >  --    < > 414*   ALBUMIN  --   --   --   --   --   --  3.7   PROTTOTAL  --   --   --   --   --   --  6.2*   BILITOTAL  --   --   --   --   --   --  0.5   ALKPHOS  --   --   --   --   --   --  141   ALT  --   --   --   --   --   --  15   AST  --   --   --   --   --   --  18   LIPASE  --   --   --   --   --   --  20    < > = values in this interval not displayed.       Recent Results (from the past 24 hours)   US Lower Extremity Venous Duplex Bilateral    Narrative    EXAM: US LOWER EXTREMITY VENOUS DUPLEX BILATERAL  LOCATION: [facility]  DATE: 7/27/2025    INDICATION: rule out DVT, pulmonary embolism.  COMPARISON: None.  TECHNIQUE: Venous Duplex ultrasound of bilateral lower extremities with and without compression, augmentation and duplex. Color flow and spectral Doppler with waveform analysis performed.    FINDINGS: Exam includes the common femoral, femoral, popliteal veins as well as segmentally visualized deep calf veins and greater saphenous vein.     RIGHT: Nonocclusive filling defect within the distal right femoral vein. No superficial thrombophlebitis. No popliteal cyst.    LEFT: No deep vein thrombosis. No superficial thrombophlebitis. No popliteal cyst.      Impression    IMPRESSION:  1.  Nonocclusive DVT within the right femoral vein.  2.  No DVT in the left lower extremity.
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
## 2025-07-28 NOTE — PLAN OF CARE
"Goal Outcome Evaluation:      Plan of Care Reviewed With: patient    Overall Patient Progress: no changeOverall Patient Progress: no change    Outcome Evaluation: Pt A/O x4, forgetful. PIV SL and one with hep gtt. Hep gtt @1350unit/hr. Pain managed with PRN oxycodne and flexeril. Tolerating a mod carb diet well. BS when admitted to unit 406, 10 units given and recheck 285. MD notified. Assist x1 with gait belt, pt refusing walker. Tele in place. CMS intact. Plan TBD.      Problem: Adult Inpatient Plan of Care  Goal: Plan of Care Review  Description: The Plan of Care Review/Shift note should be completed every shift.  The Outcome Evaluation is a brief statement about your assessment that the patient is improving, declining, or no change.  This information will be displayed automatically on your shift  note.  Outcome: Progressing  Flowsheets (Taken 7/27/2025 1855)  Outcome Evaluation: Pt A/O x4, forgetful. PIV SL and one with hep gtt. Hep gtt @1350unit/hr. Pain managed with PRN oxycodne and flexeril. Tolerating a mod carb diet well. BS when admitted to unit 406, 10 units given and recheck 285. MD notified. Assist x1 with gait belt, pt refusing walker. Tele in place. CMS intact. Plan TBD.  Plan of Care Reviewed With: patient  Overall Patient Progress: no change  Goal: Patient-Specific Goal (Individualized)  Description: You can add care plan individualizations to a care plan. Examples of Individualization might be:  \"Parent requests to be called daily at 9am for status\", \"I have a hard time hearing out of my right ear\", or \"Do not touch me to wake me up as it startles  me\".  Outcome: Progressing  Goal: Absence of Hospital-Acquired Illness or Injury  Outcome: Progressing  Intervention: Identify and Manage Fall Risk  Recent Flowsheet Documentation  Taken 7/27/2025 1611 by Shasta Mooney RN  Safety Promotion/Fall Prevention:   activity supervised   assistive device/personal items within reach   clutter free " environment maintained   increased rounding and observation   nonskid shoes/slippers when out of bed   room door open   room near nurse's station   room organization consistent   safety round/check completed   supervised activity  Intervention: Prevent Skin Injury  Recent Flowsheet Documentation  Taken 7/27/2025 1447 by Shasta Mooney RN  Body Position: supine, head elevated  Intervention: Prevent and Manage VTE (Venous Thromboembolism) Risk  Recent Flowsheet Documentation  Taken 7/27/2025 1611 by Shasta Mooney RN  VTE Prevention/Management: SCDs off (sequential compression devices)  Intervention: Prevent Infection  Recent Flowsheet Documentation  Taken 7/27/2025 1611 by Shasta Mooney RN  Infection Prevention:   equipment surfaces disinfected   hand hygiene promoted   personal protective equipment utilized   rest/sleep promoted   single patient room provided  Goal: Optimal Comfort and Wellbeing  Outcome: Progressing  Goal: Readiness for Transition of Care  Outcome: Progressing  Intervention: Mutually Develop Transition Plan  Recent Flowsheet Documentation  Taken 7/27/2025 1511 by Shasta Mooney RN  Equipment Currently Used at Home:   cane, quad   walker, rolling     Problem: Comorbidity Management  Goal: Maintenance of Asthma Control  Outcome: Progressing  Intervention: Maintain Asthma Symptom Control  Recent Flowsheet Documentation  Taken 7/27/2025 1611 by Shasta Mooney RN  Medication Review/Management: medications reviewed  Goal: Blood Glucose Levels Within Targeted Range  Outcome: Progressing  Intervention: Monitor and Manage Glycemia  Recent Flowsheet Documentation  Taken 7/27/2025 1611 by Shasta Mooney RN  Medication Review/Management: medications reviewed     Problem: Fall Injury Risk  Goal: Absence of Fall and Fall-Related Injury  Outcome: Progressing  Intervention: Identify and Manage Contributors  Recent Flowsheet Documentation  Taken 7/27/2025 1611 by Shasta Mooney  RN  Medication Review/Management: medications reviewed  Intervention: Promote Injury-Free Environment  Recent Flowsheet Documentation  Taken 7/27/2025 1611 by Shasta Mooney RN  Safety Promotion/Fall Prevention:   activity supervised   assistive device/personal items within reach   clutter free environment maintained   increased rounding and observation   nonskid shoes/slippers when out of bed   room door open   room near nurse's station   room organization consistent   safety round/check completed   supervised activity

## 2025-07-28 NOTE — PLAN OF CARE
"Goal Outcome Evaluation:      Plan of Care Reviewed With: patient, sibling    Overall Patient Progress: improvingOverall Patient Progress: improving     A&Ox4. VSS on room air. Tele SR. Pain all over managed with scheduled flexeril and prn oxycodone. Prn rizatriptan given for migraine. Prn po zofran given for nausea. Denies SOB. Reports some n/t to R hand otherwise CMS intact. MD aware. Heparin remains infusing at 1350 units/hour. Xa recheck in am per orders. Tolerating mod carb diet. /105 this shift. Potassium 2.9 this am, MD notified. Replaced po. Recheck this afternoon. Up ax1 gb. Voiding appropriately. No loose bms this shift. Enteric precautions maintained, need stool sample to r/o C. Diff. Discharge plan is TBD.    Problem: Adult Inpatient Plan of Care  Goal: Plan of Care Review  Description: The Plan of Care Review/Shift note should be completed every shift.  The Outcome Evaluation is a brief statement about your assessment that the patient is improving, declining, or no change.  This information will be displayed automatically on your shift  note.  Outcome: Progressing  Flowsheets (Taken 7/28/2025 1220)  Plan of Care Reviewed With:   patient   sibling  Overall Patient Progress: improving  Goal: Patient-Specific Goal (Individualized)  Description: You can add care plan individualizations to a care plan. Examples of Individualization might be:  \"Parent requests to be called daily at 9am for status\", \"I have a hard time hearing out of my right ear\", or \"Do not touch me to wake me up as it startles  me\".  Outcome: Progressing  Goal: Absence of Hospital-Acquired Illness or Injury  Outcome: Progressing  Intervention: Identify and Manage Fall Risk  Recent Flowsheet Documentation  Taken 7/28/2025 1037 by Esmer Yoo, RN  Safety Promotion/Fall Prevention: safety round/check completed  Intervention: Prevent Skin Injury  Recent Flowsheet Documentation  Taken 7/28/2025 1037 by Esmer Yoo, RN  Body " Position: position changed independently  Skin Protection: adhesive use limited  Intervention: Prevent and Manage VTE (Venous Thromboembolism) Risk  Recent Flowsheet Documentation  Taken 7/28/2025 1037 by Esmer Yoo RN  VTE Prevention/Management: SCDs off (sequential compression devices)  Intervention: Prevent Infection  Recent Flowsheet Documentation  Taken 7/28/2025 1037 by Esmer Yoo RN  Infection Prevention:   hand hygiene promoted   rest/sleep promoted   single patient room provided  Goal: Optimal Comfort and Wellbeing  Outcome: Progressing  Intervention: Monitor Pain and Promote Comfort  Recent Flowsheet Documentation  Taken 7/28/2025 0834 by Esmer Yoo RN  Pain Management Interventions:   medication (see MAR)   rest  Goal: Readiness for Transition of Care  Outcome: Progressing     Problem: Comorbidity Management  Goal: Maintenance of Asthma Control  Outcome: Progressing  Intervention: Maintain Asthma Symptom Control  Recent Flowsheet Documentation  Taken 7/28/2025 1037 by Esmer Yoo RN  Medication Review/Management: medications reviewed  Goal: Blood Glucose Levels Within Targeted Range  Outcome: Progressing  Intervention: Monitor and Manage Glycemia  Recent Flowsheet Documentation  Taken 7/28/2025 1037 by Esmer Yoo RN  Medication Review/Management: medications reviewed     Problem: Fall Injury Risk  Goal: Absence of Fall and Fall-Related Injury  Outcome: Progressing  Intervention: Identify and Manage Contributors  Recent Flowsheet Documentation  Taken 7/28/2025 1037 by Esmer Yoo RN  Medication Review/Management: medications reviewed  Intervention: Promote Injury-Free Environment  Recent Flowsheet Documentation  Taken 7/28/2025 1037 by Esmer Yoo RN  Safety Promotion/Fall Prevention: safety round/check completed

## 2025-07-29 ENCOUNTER — APPOINTMENT (OUTPATIENT)
Dept: PHYSICAL THERAPY | Facility: CLINIC | Age: 55
DRG: 175 | End: 2025-07-29
Attending: HOSPITALIST
Payer: MEDICARE

## 2025-07-29 LAB
ANION GAP SERPL CALCULATED.3IONS-SCNC: 13 MMOL/L (ref 7–15)
B2 GLYCOPROT1 IGG SERPL IA-ACNC: <0.8 U/ML
B2 GLYCOPROT1 IGM SERPL IA-ACNC: <2.4 U/ML
BUN SERPL-MCNC: 4.3 MG/DL (ref 6–20)
CALCIUM SERPL-MCNC: 8.5 MG/DL (ref 8.8–10.4)
CARDIOLIPIN IGG SER IA-ACNC: <2 GPL-U/ML
CARDIOLIPIN IGG SER IA-ACNC: NEGATIVE
CARDIOLIPIN IGM SER IA-ACNC: <2 MPL-U/ML
CARDIOLIPIN IGM SER IA-ACNC: NEGATIVE
CHLORIDE SERPL-SCNC: 106 MMOL/L (ref 98–107)
CREAT SERPL-MCNC: 0.47 MG/DL (ref 0.51–0.95)
EGFRCR SERPLBLD CKD-EPI 2021: >90 ML/MIN/1.73M2
GLUCOSE BLDC GLUCOMTR-MCNC: 127 MG/DL (ref 70–99)
GLUCOSE BLDC GLUCOMTR-MCNC: 139 MG/DL (ref 70–99)
GLUCOSE BLDC GLUCOMTR-MCNC: 158 MG/DL (ref 70–99)
GLUCOSE BLDC GLUCOMTR-MCNC: 194 MG/DL (ref 70–99)
GLUCOSE BLDC GLUCOMTR-MCNC: 204 MG/DL (ref 70–99)
GLUCOSE SERPL-MCNC: 125 MG/DL (ref 70–99)
HCO3 SERPL-SCNC: 22 MMOL/L (ref 22–29)
POTASSIUM SERPL-SCNC: 3.3 MMOL/L (ref 3.4–5.3)
POTASSIUM SERPL-SCNC: 3.3 MMOL/L (ref 3.4–5.3)
POTASSIUM SERPL-SCNC: 4.2 MMOL/L (ref 3.4–5.3)
SODIUM SERPL-SCNC: 141 MMOL/L (ref 135–145)
UFH PPP CHRO-ACNC: 0.49 IU/ML (ref ?–1.1)

## 2025-07-29 PROCEDURE — 250N000013 HC RX MED GY IP 250 OP 250 PS 637: Performed by: HOSPITALIST

## 2025-07-29 PROCEDURE — 97530 THERAPEUTIC ACTIVITIES: CPT | Mod: GP | Performed by: PHYSICAL THERAPIST

## 2025-07-29 PROCEDURE — 250N000013 HC RX MED GY IP 250 OP 250 PS 637: Performed by: INTERNAL MEDICINE

## 2025-07-29 PROCEDURE — 80048 BASIC METABOLIC PNL TOTAL CA: CPT | Performed by: HOSPITALIST

## 2025-07-29 PROCEDURE — 36415 COLL VENOUS BLD VENIPUNCTURE: CPT | Performed by: HOSPITALIST

## 2025-07-29 PROCEDURE — 84132 ASSAY OF SERUM POTASSIUM: CPT | Performed by: HOSPITALIST

## 2025-07-29 PROCEDURE — 85520 HEPARIN ASSAY: CPT | Performed by: HOSPITALIST

## 2025-07-29 PROCEDURE — 97161 PT EVAL LOW COMPLEX 20 MIN: CPT | Mod: GP | Performed by: PHYSICAL THERAPIST

## 2025-07-29 PROCEDURE — 120N000001 HC R&B MED SURG/OB

## 2025-07-29 PROCEDURE — 250N000011 HC RX IP 250 OP 636: Performed by: HOSPITALIST

## 2025-07-29 PROCEDURE — 97116 GAIT TRAINING THERAPY: CPT | Mod: GP | Performed by: PHYSICAL THERAPIST

## 2025-07-29 PROCEDURE — 99233 SBSQ HOSP IP/OBS HIGH 50: CPT | Performed by: HOSPITALIST

## 2025-07-29 RX ORDER — OXYCODONE HYDROCHLORIDE 10 MG/1
10 TABLET ORAL
Refills: 0 | Status: DISCONTINUED | OUTPATIENT
Start: 2025-07-29 | End: 2025-07-29

## 2025-07-29 RX ORDER — PROCHLORPERAZINE 25 MG
25 SUPPOSITORY, RECTAL RECTAL EVERY 12 HOURS PRN
Status: DISCONTINUED | OUTPATIENT
Start: 2025-07-29 | End: 2025-07-30 | Stop reason: HOSPADM

## 2025-07-29 RX ORDER — POTASSIUM CHLORIDE 1500 MG/1
40 TABLET, EXTENDED RELEASE ORAL ONCE
Status: COMPLETED | OUTPATIENT
Start: 2025-07-29 | End: 2025-07-29

## 2025-07-29 RX ORDER — PROCHLORPERAZINE MALEATE 5 MG/1
10 TABLET ORAL EVERY 6 HOURS PRN
Status: DISCONTINUED | OUTPATIENT
Start: 2025-07-29 | End: 2025-07-30 | Stop reason: HOSPADM

## 2025-07-29 RX ORDER — OXYCODONE HYDROCHLORIDE 10 MG/1
10 TABLET ORAL
Refills: 0 | Status: DISCONTINUED | OUTPATIENT
Start: 2025-07-29 | End: 2025-07-30

## 2025-07-29 RX ORDER — AMOXICILLIN 250 MG
1 CAPSULE ORAL 2 TIMES DAILY
Status: DISCONTINUED | OUTPATIENT
Start: 2025-07-29 | End: 2025-07-30 | Stop reason: HOSPADM

## 2025-07-29 RX ADMIN — POTASSIUM CHLORIDE 40 MEQ: 20 TABLET, EXTENDED RELEASE ORAL at 08:08

## 2025-07-29 RX ADMIN — CYCLOBENZAPRINE 10 MG: 10 TABLET, FILM COATED ORAL at 20:59

## 2025-07-29 RX ADMIN — INSULIN GLARGINE 20 UNITS: 100 INJECTION, SOLUTION SUBCUTANEOUS at 21:04

## 2025-07-29 RX ADMIN — CYCLOBENZAPRINE 10 MG: 10 TABLET, FILM COATED ORAL at 08:08

## 2025-07-29 RX ADMIN — ONDANSETRON 4 MG: 4 TABLET, ORALLY DISINTEGRATING ORAL at 10:26

## 2025-07-29 RX ADMIN — OXYCODONE HYDROCHLORIDE 10 MG: 10 TABLET ORAL at 19:03

## 2025-07-29 RX ADMIN — OXYCODONE HYDROCHLORIDE 10 MG: 10 TABLET ORAL at 14:04

## 2025-07-29 RX ADMIN — CYCLOBENZAPRINE 10 MG: 10 TABLET, FILM COATED ORAL at 14:04

## 2025-07-29 RX ADMIN — PROCHLORPERAZINE MALEATE 10 MG: 5 TABLET ORAL at 14:31

## 2025-07-29 RX ADMIN — ALBUTEROL SULFATE 2 PUFF: 90 AEROSOL, METERED RESPIRATORY (INHALATION) at 09:21

## 2025-07-29 RX ADMIN — OXYCODONE HYDROCHLORIDE 10 MG: 10 TABLET ORAL at 08:08

## 2025-07-29 RX ADMIN — SENNOSIDES AND DOCUSATE SODIUM 1 TABLET: 50; 8.6 TABLET ORAL at 20:59

## 2025-07-29 RX ADMIN — SENNOSIDES AND DOCUSATE SODIUM 1 TABLET: 50; 8.6 TABLET ORAL at 09:22

## 2025-07-29 RX ADMIN — PROCHLORPERAZINE MALEATE 10 MG: 5 TABLET ORAL at 00:28

## 2025-07-29 RX ADMIN — CETIRIZINE HYDROCHLORIDE 10 MG: 10 TABLET, FILM COATED ORAL at 20:59

## 2025-07-29 RX ADMIN — INSULIN ASPART 3 UNITS: 100 INJECTION, SOLUTION INTRAVENOUS; SUBCUTANEOUS at 16:52

## 2025-07-29 RX ADMIN — LEVOTHYROXINE SODIUM 100 MCG: 0.1 TABLET ORAL at 21:00

## 2025-07-29 ASSESSMENT — ACTIVITIES OF DAILY LIVING (ADL)
ADLS_ACUITY_SCORE: 65
ADLS_ACUITY_SCORE: 66
ADLS_ACUITY_SCORE: 65
ADLS_ACUITY_SCORE: 66
ADLS_ACUITY_SCORE: 65
ADLS_ACUITY_SCORE: 66
ADLS_ACUITY_SCORE: 65
ADLS_ACUITY_SCORE: 66
ADLS_ACUITY_SCORE: 65
ADLS_ACUITY_SCORE: 66
ADLS_ACUITY_SCORE: 65
ADLS_ACUITY_SCORE: 65

## 2025-07-29 NOTE — CONSULTS
Patient did not elect Medicare D and has no other prescription coverage.     Prices using [email or web address] discount card at [facility]:     Xarelto: $584/mo   Eliquis: $570/mo   Dabigatran: $177/mo   Jantoven (warfarin): $15/mo      Enoxaparin 100mg x 10 syringes:  $201.     Discharge Pharmacy can dispense 1 mo free Xarelto or Eliquis, provided patient has not received a previous supply.    Free Eliquis and Xarelto are available through the mail to income-eligible patients.  Application and info for Eliquis at [employer] or by calling [phone].  Application and info for Xarelto at [employer] or [phone].    Dabigatran is available at [email or web address] for $60/mo.       Cammie Ghotra  Pharmacy Technician/Liaison, Discharge Pharmacy   [phone] (voice or text)  [email or web address]  Pharmacy test claims are estimates and are rounded to the nearest dollar amount.  Pricing is subject to change.  Suggested alternatives aim to be cost-effective and may not be therapeutically equivalent as this consult is informational and does not constitute medical advice.  Clinical decisions should be made by qualified healthcare providers.

## 2025-07-29 NOTE — PLAN OF CARE
"Pt did not have BM for the last two days. Dr. Krishnan notified and ok to discontinue enteric precaution. Up with SBA/IV pole. Tolerating diet. Scheduled oxy given. Voiding without any issues. Hep drip infusing at 1350ml/hr. No major event noted during shift.  Problem: Adult Inpatient Plan of Care  Goal: Plan of Care Review  Description: The Plan of Care Review/Shift note should be completed every shift.  The Outcome Evaluation is a brief statement about your assessment that the patient is improving, declining, or no change.  This information will be displayed automatically on your shift  note.  Outcome: Progressing  Flowsheets (Taken 7/29/2025 1736)  Plan of Care Reviewed With: patient  Overall Patient Progress: improving  Goal: Patient-Specific Goal (Individualized)  Description: You can add care plan individualizations to a care plan. Examples of Individualization might be:  \"Parent requests to be called daily at 9am for status\", \"I have a hard time hearing out of my right ear\", or \"Do not touch me to wake me up as it startles  me\".  Outcome: Progressing  Goal: Absence of Hospital-Acquired Illness or Injury  Outcome: Progressing  Intervention: Identify and Manage Fall Risk  Recent Flowsheet Documentation  Taken 7/29/2025 1609 by Aida Vargas RN  Safety Promotion/Fall Prevention:   safety round/check completed   room organization consistent   lighting adjusted   nonskid shoes/slippers when out of bed   patient and family education   clutter free environment maintained  Intervention: Prevent Skin Injury  Recent Flowsheet Documentation  Taken 7/29/2025 1609 by Aida Vargas RN  Body Position:   position changed independently   supine, head elevated  Intervention: Prevent and Manage VTE (Venous Thromboembolism) Risk  Recent Flowsheet Documentation  Taken 7/29/2025 1609 by Aida Vargas RN  VTE Prevention/Management: SCDs off (sequential compression devices)  Intervention: Prevent Infection  Recent Flowsheet " Documentation  Taken 7/29/2025 1609 by Aida Vargas RN  Infection Prevention:   personal protective equipment utilized   rest/sleep promoted   single patient room provided  Goal: Optimal Comfort and Wellbeing  Outcome: Progressing  Goal: Readiness for Transition of Care  Outcome: Progressing     Problem: Comorbidity Management  Goal: Maintenance of Asthma Control  Outcome: Progressing  Intervention: Maintain Asthma Symptom Control  Recent Flowsheet Documentation  Taken 7/29/2025 1609 by Aida Vargas RN  Medication Review/Management: medications reviewed  Goal: Blood Glucose Levels Within Targeted Range  Outcome: Progressing  Intervention: Monitor and Manage Glycemia  Recent Flowsheet Documentation  Taken 7/29/2025 1609 by Aida Vargas RN  Medication Review/Management: medications reviewed     Problem: Fall Injury Risk  Goal: Absence of Fall and Fall-Related Injury  Outcome: Progressing  Intervention: Identify and Manage Contributors  Recent Flowsheet Documentation  Taken 7/29/2025 1609 by Aida Vargas RN  Medication Review/Management: medications reviewed  Intervention: Promote Injury-Free Environment  Recent Flowsheet Documentation  Taken 7/29/2025 1609 by Aida Vargas RN  Safety Promotion/Fall Prevention:   safety round/check completed   room organization consistent   lighting adjusted   nonskid shoes/slippers when out of bed   patient and family education   clutter free environment maintained   Goal Outcome Evaluation:      Plan of Care Reviewed With: patient    Overall Patient Progress: improvingOverall Patient Progress: improving

## 2025-07-29 NOTE — PLAN OF CARE
" Goal Outcome Evaluation:      Plan of Care Reviewed With: patient    Overall Patient Progress: no changeOverall Patient Progress: no change    Outcome Evaluation: A&O x4, VSS on RA, pain mngd w/ oxycodone & flexeril, Ax1 Gb walker, voiding, tele SR 80's, hep gtt infusing per protocol- recheck 0600, k protocol Am recheck, ACHS glucose, discharge plan tbd    Problem: Adult Inpatient Plan of Care  Goal: Plan of Care Review  Description: The Plan of Care Review/Shift note should be completed every shift.  The Outcome Evaluation is a brief statement about your assessment that the patient is improving, declining, or no change.  This information will be displayed automatically on your shift  note.  Outcome: Progressing  Flowsheets (Taken 7/28/2025 2216)  Outcome Evaluation: A&O x4, VSS on RA, pain mngd w/ oxycodone & flexeril, Ax1 Gb walker, voiding, tele SR 80's, hep gtt infusing per protocol- recheck 0600, k protocol Am recheck, ACHS glucose, discharge plan tbd  Plan of Care Reviewed With: patient  Overall Patient Progress: no change  Goal: Patient-Specific Goal (Individualized)  Description: You can add care plan individualizations to a care plan. Examples of Individualization might be:  \"Parent requests to be called daily at 9am for status\", \"I have a hard time hearing out of my right ear\", or \"Do not touch me to wake me up as it startles  me\".  Outcome: Progressing  Goal: Absence of Hospital-Acquired Illness or Injury  Outcome: Progressing  Intervention: Identify and Manage Fall Risk  Recent Flowsheet Documentation  Taken 7/28/2025 1700 by Citlali Parikh RN  Safety Promotion/Fall Prevention: safety round/check completed  Intervention: Prevent Skin Injury  Recent Flowsheet Documentation  Taken 7/28/2025 1700 by Citlali Parikh RN  Body Position: supine, head elevated  Intervention: Prevent and Manage VTE (Venous Thromboembolism) Risk  Recent Flowsheet Documentation  Taken 7/28/2025 1700 by Citlali Parikh, " RN  VTE Prevention/Management: SCDs off (sequential compression devices)  Intervention: Prevent Infection  Recent Flowsheet Documentation  Taken 7/28/2025 1700 by Citlali Parikh RN  Infection Prevention: personal protective equipment utilized  Goal: Optimal Comfort and Wellbeing  Outcome: Progressing  Intervention: Monitor Pain and Promote Comfort  Recent Flowsheet Documentation  Taken 7/28/2025 2008 by Citlali Parikh RN  Pain Management Interventions:   MD notified (comment)   emotional support   care clustered   rest   quiet environment facilitated  Taken 7/28/2025 1806 by Citlali Parikh RN  Pain Management Interventions:   rest   emotional support   quiet environment facilitated  Taken 7/28/2025 1724 by Citlali Parikh RN  Pain Management Interventions: medication (see MAR)  Goal: Readiness for Transition of Care  Outcome: Progressing     Problem: Comorbidity Management  Goal: Maintenance of Asthma Control  Outcome: Progressing  Intervention: Maintain Asthma Symptom Control  Recent Flowsheet Documentation  Taken 7/28/2025 1700 by Citlali Parikh RN  Medication Review/Management: medications reviewed  Goal: Blood Glucose Levels Within Targeted Range  Outcome: Progressing  Intervention: Monitor and Manage Glycemia  Recent Flowsheet Documentation  Taken 7/28/2025 1700 by Citlali Parikh RN  Medication Review/Management: medications reviewed     Problem: Fall Injury Risk  Goal: Absence of Fall and Fall-Related Injury  Outcome: Progressing  Intervention: Identify and Manage Contributors  Recent Flowsheet Documentation  Taken 7/28/2025 1700 by Citlali Parikh RN  Medication Review/Management: medications reviewed  Intervention: Promote Injury-Free Environment  Recent Flowsheet Documentation  Taken 7/28/2025 1700 by Citlali Parikh RN  Safety Promotion/Fall Prevention: safety round/check completed

## 2025-07-29 NOTE — CONSULTS
"CLINICAL NUTRITION SERVICES - ASSESSMENT NOTE    Registered Dietitian Interventions:  - Discussed current diet order and provided with room service menu outlining g CHO in each food item.  Encouraged protein sources with meals.        REASON FOR ASSESSMENT  Malnutrition screening tool (MST).     PMH: DMII, hypothyroidism, anxiety, chronic pain, HTN.    Admit 2/2: Fall/loss of consciousness, PE, N/V/D, colitis, large volume intrahepatic vein thrombus.    INFORMATION OBTAINED  Assessed patient in room.    NUTRITION HISTORY  - Diet at home: Describes concerns with food access and ability to afford food.  Tells me LSW has already met w/ her to discuss food access/resources and I see the LSW note regarding this + overall social situation (VA will be filed upon discharge).    - Usual intakes: Sandra tells me she lives w/ her boyfriend who is reportedly supposed to be helping with grocery shopping and meal preparation as she is too weak to stand to prepare meals + does not trust herself with an oven, mentions she doesn't know hot to use the air fryer and their microwave is broken.    - Barriers to PO intakes: Tells me she has no concerns with appetite or the ability to eat, it's her ability to afford and have consistent access to prepared foods or the right foods.  She also describes transportation concerns and financial limitations to having food delivered.  Because of these social situations she describes wt loss over the past year.  - Sandra also shares w/ me her concerns r/t glycemic control, not having \"healthy\" or \"diabetic friendly\" food/meals/snacks + states she at one point in time had a CGM, but did not have a capable phone to link w/ the device for glycemic monitoring?  - Allergies: NKFA.    CURRENT NUTRITION ORDERS  Diet: Mod CHO  Snacks/Supplements: None    CURRENT INTAKE/TOLERANCE  - Limited timeframe of admit, 100% intakes x2 thus far.    LABS: Reviewed  Lab Results   Component Value Date    A1C 14.9 " "06/21/2025    A1C 14.6 02/16/2025    A1C 6.5 08/21/2003    A1C 6.4 12/02/2002   - K slightly low at 3.3 (being replaced)    MEDICATIONS: Reviewed  - Insulin regimen noted    SYSTEM AND PHYSICAL FINDINGS:  - Stooling patterns noted.  - No documentation of PI.    ANTHROPOMETRICS  Height: 167.6 cm (5' 6\")  Admission Weight: 75.4 kg (166 lb 3.6 oz) (07/27/25 1447)   Most Recent Weight: 75.4 kg (166 lb 3.6 oz) (07/27/25 1447)  BMI: Body mass index is 26.83 kg/m .   Weight History:   Wt Readings from Last 10 Encounters:   07/27/25 75.4 kg (166 lb 3.6 oz)   06/21/25 79.4 kg (175 lb)   02/16/25 79.8 kg (175 lb 14.8 oz)   06/23/23 83.9 kg (185 lb)     - Trace ankle/foot edema documented.  - No weight trends available since 2023.  Sandra herself tells me she has lost almost 100# in the past year, not clear.  - Hyperglycemia/altered metabolism given A1C of 14.9 could also be contributing to weight loss.    ASSESSED NUTRITION NEEDS  Dosing Weight: 75 kg, based on admission wt  Estimated Energy Needs: >/=1875 kcals/day (>/=25 kcals/kg)  Justification: Minimum maintenance  Estimated Protein Needs: >/=75 grams protein/day (>/=1 grams of pro/kg)  Justification: Preservation of lean body mass  Estimated Fluid Needs: 1 mL/kcal or per provider    MALNUTRITION  % Intake: Very difficult to determine, see above  % Weight Loss: Unable to assess , see above  Subcutaneous Fat Loss: Triceps: Mild --> not using as indicator with only 1 region present  Muscle Loss: Unable to fully assess  Fluid Accumulation/Edema: None noted  Malnutrition Diagnosis: Unable to determine due to hx and wt access  Malnutrition Present on Admission: Unable to assess    NUTRITION DIAGNOSIS  Inadequate oral intake related to financial/social concerns, possible altered metabolism with hyperglycemia as evidenced by report of wt loss and inconsistent PO intakes (degree/duration unclear).    INTERVENTIONS  Nutrition education application    GOALS  Patient to consume " "% of nutritionally adequate meal trays TID, or the equivalent with supplements/snacks.     MONITORING/EVALUATION  Progress toward goals will be monitored and evaluated per policy.      Shannon Sorto RDN, , LD  Clinical Dietitian  [facility] Message Group: \"Dietitian [[place]]\"  Office: [phone]    "

## 2025-07-29 NOTE — PROGRESS NOTES
"[facility]    Hospitalist Progress Note      Assessment & Plan   Sandra Lilly is a 55 year old female w/PMH IDDMT2, hypothyroidism, anxiety, chronic pain, HTN who presents with LOC, falls and found to have PE.     #Acute PE.  Right femoral vein DVT.  Syncope/LOC: Presents with LOC after developing NV, diarrhea today but has been having frequent falls for a little bit. Noted to be hypotensive by EMS and d dimer markedly elevated. CT head negative.  CTA chest done showing \"Acute pulmonary embolism in the segmental branch of right upper lobe, possibly additional subsegmental upper lobe emboli\" but without evidence of R heart strain, trops normal, EKG sinus.  Lower extremity doppler showed nonocclusive DVT of right femoral vein.    -TTE showed nl EF with \"potential membrane noted in LVOT\" that was not well visualized.  Consider outpatient cardiac MRI.  Nl Right ventricular function.    -Started on hep gtt.  Continue pending antiphospholipid antibodies.  Likely needs minimum of 3-6 months of treatment.  Hematology consulted. Still awaiting antibody testing on 7/29.        #NV, diarrhea.  Colitis on CT: Pt with diarrheal symptoms without blood.  Likely leading to dehydration and weakness.  Did start taking metformin day prior to loose stools starting on AM of 7/27 (day of admission).  Enteric panel negative.  C diff ordered but no further loose stools in fact feels constipated.  Discontinued c diff.  -Started stool softeners with her narcotics as below.  Try to get her up as tolerated. Up to chair.  She is tolerating PO without issue and has BS on exam.       #Large volume intrahepatic vein thrombus: Noted on CT scan.  US doppler showed small echogenic left lobe lesion likely benign hemangioma.  Intrahepatic left portal vein thrombus poorly visualized on US with probable small amount of nonocclusive thrombus near left portal/distal main portal veins.  Recommendation for liver MRI.  -Patient " is not able to tolerate MRI of the abdomen due to severe anxiety.  She states that she needs an open MRI in order to have this done.  Unfortunately, we do not have this available at [facility].  This will need to be done as outpatient.  -She will be on anticoagulation as above.     #Generalized weakness: Patient with multiple falls at home with generalized weakness and deconditioning.  She tells me that she recently had a primary care appointment where she could not even get to her car without assistance.  We will need to have physical therapy evaluate.  She may need placement.  Fall precautions.     #IDDM2: Resumed her home Lantus 20 units nightly along with sliding scale insulin.     #Hx hypothyroidism, chronic pain: Resumed her levothyroxine.  Resumed her home oxycodone along with Flexeril.  Adjusted oxycodone to schedule 10 mg every 5 hours which is what she does at home.  PDMP reviewed.       DVT Prophylaxis: as above  Code Status: Full Code  Medically Ready for Discharge: Anticipated in 2-4 Days pending PT eval for placement.     Brother updated by phone on 7/29.     Jan Krisnhan MD    Interval History   Pt feels OK.  Very talkative.  Right hand/arm numbness that she had yesterday is resolved. No loose stools in fact she feels constipated. Still feels generally weak. No unilaterla weakness. No SOB, CP.  Reviewed echo results and recommendation for cardiac MRI as outpatient.  She voiced understanding.  Discussed PT eval today.     -Data reviewed today: I reviewed all new labs and imaging results over the last 24 hours. I personally reviewed     Physical Exam   Temp: 97.6  F (36.4  C) Temp src: Temporal BP: 125/80 Pulse: 82   Resp: 18 SpO2: 95 % O2 Device: None (Room air)    Vitals:    07/27/25 1447   Weight: 75.4 kg (166 lb 3.6 oz)     Vital Signs with Ranges  Temp:  [97.6  F (36.4  C)-98.2  F (36.8  C)] 97.6  F (36.4  C)  Pulse:  [82-94] 82  Resp:  [16-18] 18  BP: (125-153)/(80-91) 125/80  SpO2:  [94 %-98 %] 95  %  I/O last 3 completed shifts:  In: 420 [P.O.:420]  Out: -     Constitutional: Generally deconditioned.  No acute distress.  She appears older than stated age.  Very talkative.    HEENT: Normocephalic. MMM, No elevation of JVD noted.   Respiratory: Nl WOB, Clear bilaterally, No wheezes or crackles  Cardiovascular: Regular, no murmur  GI: Soft, BS+, NT, ND  Skin/Integumen: WWP, no rash. No edema  Neuro: CNII-XII intact. Moves all extremities. No tremor. A&Ox3.    Medications   Current Facility-Administered Medications   Medication Dose Route Frequency Provider Last Rate Last Admin    heparin 25,000 units in 0.45% NaCl 250 mL ANTICOAGULANT infusion  0-5,000 Units/hr Intravenous Continuous Mika Ramsay MD 13.5 mL/hr at 07/28/25 1720 1,350 Units/hr at 07/28/25 1720    Patient is already receiving anticoagulation with heparin, enoxaparin (LOVENOX), warfarin (COUMADIN)  or other anticoagulant medication   Does not apply Continuous PRN Lan Noguera DO         Current Facility-Administered Medications   Medication Dose Route Frequency Provider Last Rate Last Admin    cetirizine (zyrTEC) tablet 10 mg  10 mg Oral QPM Jan Krishnan MD   10 mg at 07/28/25 1906    cyclobenzaprine (FLEXERIL) tablet 10 mg  10 mg Oral TID Mika Ramsay MD   10 mg at 07/29/25 0808    insulin aspart (NovoLOG) injection (RAPID ACTING)  1-10 Units Subcutaneous TID AC Lan Noguera DO   3 Units at 07/27/25 1819    insulin aspart (NovoLOG) injection (RAPID ACTING)  1-7 Units Subcutaneous At Bedtime Lan Noguera DO        insulin glargine (LANTUS PEN) injection 20 Units  20 Units Subcutaneous At Bedtime Mika Ramsay MD   20 Units at 07/28/25 2157    levothyroxine (SYNTHROID/LEVOTHROID) tablet 100 mcg  100 mcg Oral Daily Lan Noguera DO   100 mcg at 07/28/25 2157    oxyCODONE IR (ROXICODONE) tablet 10 mg  10 mg Oral Q5H Jan Krishnan MD        senna-docusate (SENOKOT-S/PERICOLACE) 8.6-50 MG  per tablet 1 tablet  1 tablet Oral BID Jan Krishnan MD        sodium chloride (PF) 0.9% PF flush 3 mL  3 mL Intracatheter Q8H FERNIE Shorty Lan A, DO   3 mL at 07/28/25 2157       Data   Recent Labs   Lab 07/29/25  0741 07/29/25  0709 07/29/25  0148 07/28/25  1734 07/28/25  1547 07/28/25  1216 07/28/25  0620 07/27/25  1501 07/27/25  0800 07/27/25  0557 07/27/25  0329   WBC  --   --   --   --   --   --  9.0  --  12.4*  --  12.9*   HGB  --   --   --   --   --   --  11.5*  --  12.9  --  13.4   MCV  --   --   --   --   --   --  84  --  82  --  84   PLT  --   --   --   --   --   --  200  --  221  --  226   NA  --  141  --   --   --   --  145  --   --   --  139   POTASSIUM  --  3.3*  3.3*  --   --  4.6  --  2.9*  --   --   --  3.8   CHLORIDE  --  106  --   --   --   --  113*  --   --   --  102   CO2  --  22  --   --   --   --  20*  --   --   --  23   BUN  --  4.3*  --   --   --   --  7.8  --   --   --  16.0   CR  --  0.47*  --   --   --   --  0.52  --   --   --  0.67   ANIONGAP  --  13  --   --   --   --  12  --   --   --  14   SHARON  --  8.5*  --   --   --   --  8.1*  --   --   --  8.9   * 125* 158*   < >  --    < > 108*   < >  --    < > 414*   ALBUMIN  --   --   --   --   --   --   --   --   --   --  3.7   PROTTOTAL  --   --   --   --   --   --   --   --   --   --  6.2*   BILITOTAL  --   --   --   --   --   --   --   --   --   --  0.5   ALKPHOS  --   --   --   --   --   --   --   --   --   --  141   ALT  --   --   --   --   --   --   --   --   --   --  15   AST  --   --   --   --   --   --   --   --   --   --  18   LIPASE  --   --   --   --   --   --   --   --   --   --  20    < > = values in this interval not displayed.       Recent Results (from the past 24 hours)   Echocardiogram Complete   Result Value    LVEF  65-70%    [facility]    [id number]  [id number]  [id number]  [id number]^SHORTY^LAN^A     [facility]  Echocardiography Laboratory  [address] [place] [address]     Name: MARCELLUS  JEFFRY ARNDT  MRN: [id number]  : 1970  Study Date: 2025 09:42 AM  Age: 55 yrs  Gender: Female  [facility] [place]  Reason For Study: Pulmonary Embolism  Ordering Physician: ROSA M ORO  Performed By: Lei Graham RDCS     BSA: 1.8 m2  Height: 66 in  Weight: 166 lb  HR: 89  BP: 118/75 mmHg  ______________________________________________________________________________  Procedure  Echocardiogram with two-dimensional, color and spectral Doppler.  ______________________________________________________________________________  Interpretation Summary     Normal LV size and systolic function. Visually estimated LV ejection fraction  is hyperdynamic at 65 to 70%.  Proximal septal thickening is noted. There is no HAILEE. In 5 chamber view, there  is a potential membrane noted in the LVOT. This is not well-visualized. This  is not seen in parasternal images. Consider cardiac MRI. There is mild flow  acceleration in the territory. Peak gradient is 13 mmHg.  Normal RV size and systolic function.  No gross hemodynamically significant valve disease. Aortic valve is trileaflet  with sclerosis. Mild TR.  No pericardial effusion. IVC is normal in size and collapsibility.  ______________________________________________________________________________  Left Ventricle  The left ventricle is normal in size. Proximal septal thickening is noted.  Hyperdynamic left ventricular function. The visual ejection fraction is 65-  70%. Diastolic Doppler findings (E/E' ratio and/or other parameters) suggest  left ventricular filling pressures are normal. No regional wall motion  abnormalities noted.     Right Ventricle  The right ventricle is normal in size and function.     Atria  Normal left atrial size. Right atrial size is normal.     Mitral Valve  There is mild mitral annular calcification. There is trace to mild mitral  regurgitation. There is no mitral valve stenosis.     Tricuspid Valve  The tricuspid valve is not  well visualized, but is grossly normal. There is  mild (1+) tricuspid regurgitation. The right ventricular systolic pressure is  approximated at 29.5 mmHg plus the right atrial pressure.     Aortic Valve  The aortic valve is trileaflet with aortic valve sclerosis. No hemodynamically  significant valvular aortic stenosis.     Pulmonic Valve  The pulmonic valve is not well visualized. There is trace pulmonic valvular  regurgitation.     Vessels  The aortic root is normal size. Normal size ascending aorta. The inferior vena  cava was normal in size with preserved respiratory variability.     Pericardium  There is no pericardial effusion.     ______________________________________________________________________________  MMode/2D Measurements & Calculations  IVSd: 1.4 cm  LVIDd: 4.2 cm  LVIDs: 2.6 cm  LVPWd: 0.99 cm  IVC diam: 1.7 cm  FS: 38.9 %     LV mass(C)d: 180.8 grams  LV mass(C)dI: 97.9 grams/m2  Ao root diam: 2.9 cm  LA dimension: 3.4 cm  LA/Ao: 1.2  Ao root diam index Ht(cm/m): 1.7  Ao root diam index BSA (cm/m2): 1.6  LA Volume (BP): 30.8 ml  LA Volume Index (BP): 16.6 ml/m2  RV Base: 3.3 cm  RWT: 0.47  TAPSE: 1.5 cm     Doppler Measurements & Calculations  MV E max rony: 75.2 cm/sec  MV A max rony: 95.9 cm/sec  MV E/A: 0.78  MV dec slope: 217.9 cm/sec2  MV dec time: 0.35 sec  Ao V2 max: 236.1 cm/sec  Ao max P.2 mmHg  Ao V2 mean: 164.8 cm/sec  Ao mean P.5 mmHg  Ao V2 VTI: 41.2 cm  LV V1 max P.3 mmHg  LV V1 max: 168.2 cm/sec  LV V1 VTI: 29.0 cm  TR max rony: 271.9 cm/sec  TR max P.5 mmHg  AV Rony Ratio (DI): 0.71  E/E' av.5  Lateral E/e': 6.7  Medial E/e': 8.2  RV S Rony: 11.6 cm/sec     ______________________________________________________________________________  Report approved by: Macie Stephen MD on 2025 11:33 AM

## 2025-07-29 NOTE — PLAN OF CARE
"Goal Outcome Evaluation:      Plan of Care Reviewed With: patient    Overall Patient Progress: no changeOverall Patient Progress: no change    Outcome Evaluation: VSS, on RA: chronic pain, delined meds; Ax1 GB/walker; hep gtt continued; K+ protocol recheck this AM; ACHS; plans TBD          Problem: Adult Inpatient Plan of Care  Goal: Plan of Care Review  Description: The Plan of Care Review/Shift note should be completed every shift.  The Outcome Evaluation is a brief statement about your assessment that the patient is improving, declining, or no change.  This information will be displayed automatically on your shift  note.  Outcome: Progressing  Flowsheets (Taken 7/29/2025 0617)  Outcome Evaluation:   VSS, on RA: chronic pain, delined meds   Ax1 GB/walker   hep gtt continued   K+ protocol recheck this AM   ACHS   plans TBD  Plan of Care Reviewed With: patient  Overall Patient Progress: no change  Goal: Patient-Specific Goal (Individualized)  Description: You can add care plan individualizations to a care plan. Examples of Individualization might be:  \"Parent requests to be called daily at 9am for status\", \"I have a hard time hearing out of my right ear\", or \"Do not touch me to wake me up as it startles  me\".  Outcome: Progressing  Goal: Absence of Hospital-Acquired Illness or Injury  Outcome: Progressing  Intervention: Identify and Manage Fall Risk  Recent Flowsheet Documentation  Taken 7/29/2025 0028 by Laurie Elliott, RN  Safety Promotion/Fall Prevention:   safety round/check completed   room organization consistent   lighting adjusted   nonskid shoes/slippers when out of bed   patient and family education   clutter free environment maintained  Intervention: Prevent Skin Injury  Recent Flowsheet Documentation  Taken 7/29/2025 0028 by Laurie Elliott, RN  Body Position: side-lying  Intervention: Prevent and Manage VTE (Venous Thromboembolism) Risk  Recent Flowsheet Documentation  Taken 7/29/2025 0028 by Lexi, " LONDON Sullivan  VTE Prevention/Management: SCDs off (sequential compression devices)  Intervention: Prevent Infection  Recent Flowsheet Documentation  Taken 7/29/2025 0028 by Laurie Elliott RN  Infection Prevention:   personal protective equipment utilized   rest/sleep promoted   single patient room provided  Goal: Optimal Comfort and Wellbeing  Outcome: Progressing  Intervention: Monitor Pain and Promote Comfort  Recent Flowsheet Documentation  Taken 7/29/2025 0028 by Laurie Elliott RN  Pain Management Interventions:   quiet environment facilitated   rest  Goal: Readiness for Transition of Care  Outcome: Progressing     Problem: Comorbidity Management  Goal: Maintenance of Asthma Control  Outcome: Progressing  Intervention: Maintain Asthma Symptom Control  Recent Flowsheet Documentation  Taken 7/29/2025 0028 by Laurie Elliott RN  Medication Review/Management: medications reviewed  Goal: Blood Glucose Levels Within Targeted Range  Outcome: Progressing  Intervention: Monitor and Manage Glycemia  Recent Flowsheet Documentation  Taken 7/29/2025 0028 by Laurie Elliott RN  Medication Review/Management: medications reviewed     Problem: Fall Injury Risk  Goal: Absence of Fall and Fall-Related Injury  Outcome: Progressing  Intervention: Identify and Manage Contributors  Recent Flowsheet Documentation  Taken 7/29/2025 0028 by Laurie Elliott RN  Medication Review/Management: medications reviewed  Intervention: Promote Injury-Free Environment  Recent Flowsheet Documentation  Taken 7/29/2025 0028 by Laurie Elliott RN  Safety Promotion/Fall Prevention:   safety round/check completed   room organization consistent   lighting adjusted   nonskid shoes/slippers when out of bed   patient and family education   clutter free environment maintained
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
## 2025-07-29 NOTE — CONSULTS
Care Management Follow Up    Length of Stay (days): 2    Expected Discharge Date: 07/30/2025     Concerns to be Addressed: discharge planning       Patient plan of care discussed at interdisciplinary rounds: Yes    Anticipated Discharge Disposition:  TBD pending PT eval     Anticipated Discharge Services:  TBD pending PT eval    Anticipated Discharge DME: None    Patient/family educated on Medicare website which has current facility and service quality ratings: no    Education Provided on the Discharge Plan: No    Patient/Family in Agreement with the Plan: unable to assess    Private pay costs discussed: Not applicable    Discussed  Partnership in Safe Discharge Planning  document with patient/family: No     Handoff Completed: No, handoff not indicated or clinically appropriate    Additional Information:  CM/SW was consulted for an elevated unplanned readmission risk score. SW already following. Please see initial consult note from 7/28.     Next Steps:   SW will continue following until discharge and remain available as needed.    MARTIN Joel  Inpatient Care Coordination   [facility]  [phone]

## 2025-07-29 NOTE — PLAN OF CARE
"Goal Outcome Evaluation:      Plan of Care Reviewed With: patient    Overall Patient Progress: no changeOverall Patient Progress: no change     Alert and oriented. Forgetful. Vss on room air. Up with assist of 1 with gait belt. Chronic pain controlled with scheduled meds. Hep gtt at 1350units/hr. Recheck in am. Blood sugars monitored. Prn zofran x1. K protocol replaced, recheck this afternoon. Tele SR. Voiding well. PT eval today.        Problem: Adult Inpatient Plan of Care  Goal: Plan of Care Review  Description: The Plan of Care Review/Shift note should be completed every shift.  The Outcome Evaluation is a brief statement about your assessment that the patient is improving, declining, or no change.  This information will be displayed automatically on your shift  note.  Outcome: Progressing  Flowsheets (Taken 7/29/2025 1358)  Plan of Care Reviewed With: patient  Overall Patient Progress: no change  Goal: Patient-Specific Goal (Individualized)  Description: You can add care plan individualizations to a care plan. Examples of Individualization might be:  \"Parent requests to be called daily at 9am for status\", \"I have a hard time hearing out of my right ear\", or \"Do not touch me to wake me up as it startles  me\".  Outcome: Progressing  Goal: Absence of Hospital-Acquired Illness or Injury  Outcome: Progressing  Intervention: Identify and Manage Fall Risk  Recent Flowsheet Documentation  Taken 7/29/2025 0900 by Judith Cullen RN  Safety Promotion/Fall Prevention:   safety round/check completed   room organization consistent   lighting adjusted   nonskid shoes/slippers when out of bed   patient and family education   clutter free environment maintained  Intervention: Prevent Skin Injury  Recent Flowsheet Documentation  Taken 7/29/2025 1100 by Judith Cullen RN  Body Position:   position changed independently   supine, head elevated  Intervention: Prevent and Manage VTE (Venous Thromboembolism) Risk  Recent " Flowsheet Documentation  Taken 7/29/2025 0900 by Judith Cullen RN  VTE Prevention/Management: SCDs off (sequential compression devices)  Intervention: Prevent Infection  Recent Flowsheet Documentation  Taken 7/29/2025 0900 by Judith Cullen RN  Infection Prevention:   personal protective equipment utilized   rest/sleep promoted   single patient room provided  Goal: Optimal Comfort and Wellbeing  Outcome: Progressing  Intervention: Monitor Pain and Promote Comfort  Recent Flowsheet Documentation  Taken 7/29/2025 0808 by Judith Cullen RN  Pain Management Interventions: medication (see MAR)  Goal: Readiness for Transition of Care  Outcome: Progressing     Problem: Comorbidity Management  Goal: Maintenance of Asthma Control  Outcome: Progressing  Intervention: Maintain Asthma Symptom Control  Recent Flowsheet Documentation  Taken 7/29/2025 0900 by Judith Cullen RN  Medication Review/Management: medications reviewed  Goal: Blood Glucose Levels Within Targeted Range  Outcome: Progressing  Intervention: Monitor and Manage Glycemia  Recent Flowsheet Documentation  Taken 7/29/2025 0900 by Judith Cullen RN  Medication Review/Management: medications reviewed     Problem: Fall Injury Risk  Goal: Absence of Fall and Fall-Related Injury  Outcome: Progressing  Intervention: Identify and Manage Contributors  Recent Flowsheet Documentation  Taken 7/29/2025 0900 by Judith Cullen RN  Medication Review/Management: medications reviewed  Intervention: Promote Injury-Free Environment  Recent Flowsheet Documentation  Taken 7/29/2025 0900 by Judith Cullen RN  Safety Promotion/Fall Prevention:   safety round/check completed   room organization consistent   lighting adjusted   nonskid shoes/slippers when out of bed   patient and family education   clutter free environment maintained

## 2025-07-30 VITALS
BODY MASS INDEX: 26.71 KG/M2 | WEIGHT: 166.23 LBS | HEART RATE: 86 BPM | TEMPERATURE: 97 F | OXYGEN SATURATION: 92 % | RESPIRATION RATE: 16 BRPM | SYSTOLIC BLOOD PRESSURE: 109 MMHG | DIASTOLIC BLOOD PRESSURE: 68 MMHG | HEIGHT: 66 IN

## 2025-07-30 LAB
ANION GAP SERPL CALCULATED.3IONS-SCNC: 11 MMOL/L (ref 7–15)
BASOPHILS # BLD AUTO: 0 10E3/UL (ref 0–0.2)
BASOPHILS NFR BLD AUTO: 1 %
BUN SERPL-MCNC: 7.8 MG/DL (ref 6–20)
CALCIUM SERPL-MCNC: 8.9 MG/DL (ref 8.8–10.4)
CHLORIDE SERPL-SCNC: 103 MMOL/L (ref 98–107)
CREAT SERPL-MCNC: 0.5 MG/DL (ref 0.51–0.95)
EGFRCR SERPLBLD CKD-EPI 2021: >90 ML/MIN/1.73M2
EOSINOPHIL # BLD AUTO: 0.4 10E3/UL (ref 0–0.7)
EOSINOPHIL NFR BLD AUTO: 5 %
ERYTHROCYTE [DISTWIDTH] IN BLOOD BY AUTOMATED COUNT: 13.4 % (ref 10–15)
GLUCOSE BLDC GLUCOMTR-MCNC: 131 MG/DL (ref 70–99)
GLUCOSE BLDC GLUCOMTR-MCNC: 158 MG/DL (ref 70–99)
GLUCOSE SERPL-MCNC: 147 MG/DL (ref 70–99)
HCO3 SERPL-SCNC: 24 MMOL/L (ref 22–29)
HCT VFR BLD AUTO: 36.2 % (ref 35–47)
HGB BLD-MCNC: 12 G/DL (ref 11.7–15.7)
IMM GRANULOCYTES # BLD: 0.1 10E3/UL
IMM GRANULOCYTES NFR BLD: 1 %
LYMPHOCYTES # BLD AUTO: 2.6 10E3/UL (ref 0.8–5.3)
LYMPHOCYTES NFR BLD AUTO: 32 %
MCH RBC QN AUTO: 27.8 PG (ref 26.5–33)
MCHC RBC AUTO-ENTMCNC: 33.1 G/DL (ref 31.5–36.5)
MCV RBC AUTO: 84 FL (ref 78–100)
MONOCYTES # BLD AUTO: 0.5 10E3/UL (ref 0–1.3)
MONOCYTES NFR BLD AUTO: 6 %
NEUTROPHILS # BLD AUTO: 4.6 10E3/UL (ref 1.6–8.3)
NEUTROPHILS NFR BLD AUTO: 56 %
NRBC # BLD AUTO: 0 10E3/UL
NRBC BLD AUTO-RTO: 0 /100
PLATELET # BLD AUTO: 218 10E3/UL (ref 150–450)
POTASSIUM SERPL-SCNC: 4 MMOL/L (ref 3.4–5.3)
RBC # BLD AUTO: 4.32 10E6/UL (ref 3.8–5.2)
SODIUM SERPL-SCNC: 138 MMOL/L (ref 135–145)
UFH PPP CHRO-ACNC: 0.35 IU/ML (ref ?–1.1)
WBC # BLD AUTO: 8.2 10E3/UL (ref 4–11)

## 2025-07-30 PROCEDURE — 85041 AUTOMATED RBC COUNT: CPT | Performed by: HOSPITALIST

## 2025-07-30 PROCEDURE — 99239 HOSP IP/OBS DSCHRG MGMT >30: CPT | Performed by: HOSPITALIST

## 2025-07-30 PROCEDURE — 36415 COLL VENOUS BLD VENIPUNCTURE: CPT | Performed by: HOSPITALIST

## 2025-07-30 PROCEDURE — 250N000013 HC RX MED GY IP 250 OP 250 PS 637: Performed by: INTERNAL MEDICINE

## 2025-07-30 PROCEDURE — 80051 ELECTROLYTE PANEL: CPT | Performed by: HOSPITALIST

## 2025-07-30 PROCEDURE — 85520 HEPARIN ASSAY: CPT | Performed by: HOSPITALIST

## 2025-07-30 PROCEDURE — 250N000013 HC RX MED GY IP 250 OP 250 PS 637: Performed by: HOSPITALIST

## 2025-07-30 RX ORDER — APIXABAN 5 MG (74)
KIT ORAL
Qty: 1 EACH | Refills: 0 | Status: SHIPPED | OUTPATIENT
Start: 2025-07-30 | End: 2025-08-29

## 2025-07-30 RX ORDER — OXYCODONE HYDROCHLORIDE 10 MG/1
10 TABLET ORAL
Refills: 0 | Status: DISCONTINUED | OUTPATIENT
Start: 2025-07-30 | End: 2025-07-30 | Stop reason: HOSPADM

## 2025-07-30 RX ADMIN — ALBUTEROL SULFATE 2 PUFF: 90 AEROSOL, METERED RESPIRATORY (INHALATION) at 00:41

## 2025-07-30 RX ADMIN — CYCLOBENZAPRINE 10 MG: 10 TABLET, FILM COATED ORAL at 10:03

## 2025-07-30 RX ADMIN — OXYCODONE HYDROCHLORIDE 10 MG: 10 TABLET ORAL at 05:27

## 2025-07-30 RX ADMIN — OXYCODONE HYDROCHLORIDE 10 MG: 10 TABLET ORAL at 00:19

## 2025-07-30 RX ADMIN — OXYCODONE HYDROCHLORIDE 10 MG: 10 TABLET ORAL at 10:08

## 2025-07-30 RX ADMIN — APIXABAN 10 MG: 5 TABLET, FILM COATED ORAL at 10:19

## 2025-07-30 ASSESSMENT — ACTIVITIES OF DAILY LIVING (ADL)
ADLS_ACUITY_SCORE: 66
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
## 2025-07-30 NOTE — PLAN OF CARE
Goal Outcome Evaluation:     Discharge Note    Patient discharged to home via accompanied by brother.  IV: Discontinued  Prescriptions filled and given to patient/family.   Belongings reviewed and sent with patient.   Home medications returned to patient: NA  Equipment sent with: patient, N/A.   patient verbalizes understanding of discharge instructions. AVS given to patient.  Additional education completed? Anticoagulation Therapy     Pt is A&Ox4. Forgetful. VSS on RA. Tosha PO intake. BS hypoactive and intermittent nausea. Declined bowel meds. Repeated requests of staff regarding how to arrange/rearrange room. SBA with GB. Ambulating. Voiding to the bathroom. Hep gtt discontinued. Pain controlled with scheduled oxy and flexaril. SW met with pt prior to discharge to confirm plans to return home with significant other. Brother provided discharge transportation.
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
## 2025-07-30 NOTE — PLAN OF CARE
Physical Therapy Discharge Summary    Reason for therapy discharge:    Discharged to home with home therapy.    Progress towards therapy goal(s). See goals on Care Plan in [facility] electronic health record for goal details.  Goals not met.  Barriers to achieving goals:   discharge from facility.    Therapy recommendation(s):    Continued therapy is recommended.  Rationale/Recommendations:  Pt presents below baseline functional mobility demonstrating decreased tolerance to activity with Ax1 for all mobility at this time. Pt has potentially to progress quickly returning to PLOF as pt is very motivated to improve. Recommend home assist with home care physical therapy to continue improving functional mobility and strength..

## 2025-07-30 NOTE — PLAN OF CARE
"Goal Outcome Evaluation:      Plan of Care Reviewed With: patient    Overall Patient Progress: improvingOverall Patient Progress: improving    Outcome Evaluation: Pt is A&Ox4. Forgetful. VSS on RA. LS Clear. No SOB. Denies nausea. Bowel sounds hypoactive. LBM: 7/27. Tolerating PO. Modcarb diet. Assist x1 w/g, but refuses walker. Ambulating. Voiding to the bathroom. PIV R forearm running heparin @ 1350. PIV L forearm SL. Glucose checks. Pain controlled with scheduled regimen. CMS: intact. Tele: SR HR: 70. Discharge plans pending    Problem: Adult Inpatient Plan of Care  Goal: Plan of Care Review  Description: The Plan of Care Review/Shift note should be completed every shift.  The Outcome Evaluation is a brief statement about your assessment that the patient is improving, declining, or no change.  This information will be displayed automatically on your shift  note.  Outcome: Progressing  Flowsheets (Taken 7/30/2025 0129)  Outcome Evaluation: Pt is A&Ox4. Forgetful. VSS on RA. LS Clear. No SOB. Denies nausea. Bowel sounds hypoactive. LBM: 7/27. Tolerating PO. Modcarb diet. Assist x1 w/g, but refuses walker. Ambulating. Voiding to the bathroom. PIV R forearm running heparin @ 1350. PIV L forearm SL. Glucose checks. Pain controlled with scheduled regimen. CMS: intact. Tele: SR HR: 70. Discharge plans home w/HC  Plan of Care Reviewed With: patient  Overall Patient Progress: improving  Goal: Patient-Specific Goal (Individualized)  Description: You can add care plan individualizations to a care plan. Examples of Individualization might be:  \"Parent requests to be called daily at 9am for status\", \"I have a hard time hearing out of my right ear\", or \"Do not touch me to wake me up as it startles  me\".  Outcome: Progressing  Goal: Absence of Hospital-Acquired Illness or Injury  Outcome: Progressing  Intervention: Identify and Manage Fall Risk  Recent Flowsheet Documentation  Taken 7/29/2025 2058 by James Cherry, " RN  Safety Promotion/Fall Prevention:   safety round/check completed   room organization consistent   lighting adjusted   nonskid shoes/slippers when out of bed   patient and family education   clutter free environment maintained  Intervention: Prevent Skin Injury  Recent Flowsheet Documentation  Taken 7/29/2025 2058 by James Cherry RN  Body Position:   position changed independently   supine, head elevated  Skin Protection: adhesive use limited  Intervention: Prevent and Manage VTE (Venous Thromboembolism) Risk  Recent Flowsheet Documentation  Taken 7/29/2025 2058 by James Cherry RN  VTE Prevention/Management: SCDs off (sequential compression devices)  Intervention: Prevent Infection  Recent Flowsheet Documentation  Taken 7/29/2025 2058 by James Cherry RN  Infection Prevention:   personal protective equipment utilized   rest/sleep promoted   single patient room provided  Goal: Optimal Comfort and Wellbeing  Outcome: Progressing  Intervention: Monitor Pain and Promote Comfort  Recent Flowsheet Documentation  Taken 7/30/2025 0101 by James Cherry RN  Pain Management Interventions: rest  Taken 7/30/2025 0019 by James Cherry RN  Pain Management Interventions: medication (see MAR)  Taken 7/29/2025 2058 by James Cherry RN  Pain Management Interventions: rest  Goal: Readiness for Transition of Care  Outcome: Progressing     Problem: Comorbidity Management  Goal: Maintenance of Asthma Control  Outcome: Progressing  Intervention: Maintain Asthma Symptom Control  Recent Flowsheet Documentation  Taken 7/29/2025 2058 by James Cherry RN  Medication Review/Management: medications reviewed  Goal: Blood Glucose Levels Within Targeted Range  Outcome: Progressing  Intervention: Monitor and Manage Glycemia  Recent Flowsheet Documentation  Taken 7/29/2025 2058 by James Cherry RN  Medication Review/Management: medications reviewed     Problem: Fall Injury Risk  Goal: Absence of  Fall and Fall-Related Injury  Outcome: Progressing  Intervention: Identify and Manage Contributors  Recent Flowsheet Documentation  Taken 7/29/2025 2058 by James Cherry, RN  Medication Review/Management: medications reviewed  Intervention: Promote Injury-Free Environment  Recent Flowsheet Documentation  Taken 7/29/2025 2058 by James Cherry, RN  Safety Promotion/Fall Prevention:   safety round/check completed   room organization consistent   lighting adjusted   nonskid shoes/slippers when out of bed   patient and family education   clutter free environment maintained

## 2025-07-31 LAB
BACTERIA SPEC CULT: NORMAL
BACTERIA SPEC CULT: NORMAL

## 2025-08-04 ENCOUNTER — MEDICAL CORRESPONDENCE (OUTPATIENT)
Dept: HEALTH INFORMATION MANAGEMENT | Facility: CLINIC | Age: 55
End: 2025-08-04
Payer: MEDICARE

## 2025-08-08 ENCOUNTER — ANCILLARY PROCEDURE (OUTPATIENT)
Dept: ULTRASOUND IMAGING | Facility: CLINIC | Age: 55
End: 2025-08-08
Attending: EMERGENCY MEDICINE
Payer: MEDICARE

## 2025-08-08 ENCOUNTER — HOSPITAL ENCOUNTER (INPATIENT)
Facility: CLINIC | Age: 55
LOS: 3 days | Discharge: NURSING, REHAB OR LONG-TERM CARE | End: 2025-08-12
Attending: EMERGENCY MEDICINE | Admitting: STUDENT IN AN ORGANIZED HEALTH CARE EDUCATION/TRAINING PROGRAM
Payer: MEDICARE

## 2025-08-08 ENCOUNTER — APPOINTMENT (OUTPATIENT)
Dept: GENERAL RADIOLOGY | Facility: CLINIC | Age: 55
End: 2025-08-08
Attending: EMERGENCY MEDICINE
Payer: MEDICARE

## 2025-08-08 DIAGNOSIS — R53.1 GENERAL WEAKNESS: ICD-10-CM

## 2025-08-08 DIAGNOSIS — E11.10 DIABETIC KETOACIDOSIS WITHOUT COMA ASSOCIATED WITH TYPE 2 DIABETES MELLITUS (H): Primary | ICD-10-CM

## 2025-08-08 DIAGNOSIS — E86.0 DEHYDRATION: ICD-10-CM

## 2025-08-08 DIAGNOSIS — R79.89 ELEVATED PROCALCITONIN: ICD-10-CM

## 2025-08-08 DIAGNOSIS — Q20.8 ABNORMALITY OF LEFT VENTRICULAR OUTFLOW TRACT: ICD-10-CM

## 2025-08-08 DIAGNOSIS — A04.72 C. DIFFICILE DIARRHEA: ICD-10-CM

## 2025-08-08 DIAGNOSIS — G89.21 CHRONIC PAIN DUE TO TRAUMA: ICD-10-CM

## 2025-08-08 LAB
ALBUMIN SERPL BCG-MCNC: 3.9 G/DL (ref 3.5–5.2)
ALP SERPL-CCNC: 138 U/L (ref 40–150)
ALT SERPL W P-5'-P-CCNC: 24 U/L (ref 0–50)
ANION GAP SERPL CALCULATED.3IONS-SCNC: 17 MMOL/L (ref 7–15)
AST SERPL W P-5'-P-CCNC: 19 U/L (ref 0–45)
B-OH-BUTYR SERPL-SCNC: 1.26 MMOL/L
BASOPHILS # BLD AUTO: 0 10E3/UL (ref 0–0.2)
BASOPHILS NFR BLD AUTO: 0 %
BILIRUB DIRECT SERPL-MCNC: 0.19 MG/DL (ref 0–0.3)
BILIRUB SERPL-MCNC: 0.7 MG/DL
BUN SERPL-MCNC: 32.9 MG/DL (ref 6–20)
CALCIUM SERPL-MCNC: 8.8 MG/DL (ref 8.8–10.4)
CHLORIDE SERPL-SCNC: 99 MMOL/L (ref 98–107)
CREAT SERPL-MCNC: 0.65 MG/DL (ref 0.51–0.95)
EGFRCR SERPLBLD CKD-EPI 2021: >90 ML/MIN/1.73M2
EOSINOPHIL # BLD AUTO: 0 10E3/UL (ref 0–0.7)
EOSINOPHIL NFR BLD AUTO: 0 %
ERYTHROCYTE [DISTWIDTH] IN BLOOD BY AUTOMATED COUNT: 13.3 % (ref 10–15)
GLUCOSE BLDC GLUCOMTR-MCNC: 302 MG/DL (ref 70–99)
GLUCOSE BLDC GLUCOMTR-MCNC: 363 MG/DL (ref 70–99)
GLUCOSE BLDC GLUCOMTR-MCNC: 452 MG/DL (ref 70–99)
GLUCOSE SERPL-MCNC: 470 MG/DL (ref 70–99)
HCO3 SERPL-SCNC: 18 MMOL/L (ref 22–29)
HCT VFR BLD AUTO: 45.5 % (ref 35–47)
HGB BLD-MCNC: 14.8 G/DL (ref 11.7–15.7)
IMM GRANULOCYTES # BLD: 0.1 10E3/UL
IMM GRANULOCYTES NFR BLD: 1 %
LACTATE SERPL-SCNC: 2.5 MMOL/L (ref 0.7–2)
LACTATE SERPL-SCNC: 3.1 MMOL/L (ref 0.7–2)
LIPASE SERPL-CCNC: 10 U/L (ref 13–60)
LYMPHOCYTES # BLD AUTO: 0.5 10E3/UL (ref 0.8–5.3)
LYMPHOCYTES NFR BLD AUTO: 3 %
MAGNESIUM SERPL-MCNC: 1.9 MG/DL (ref 1.7–2.3)
MCH RBC QN AUTO: 27.7 PG (ref 26.5–33)
MCHC RBC AUTO-ENTMCNC: 32.5 G/DL (ref 31.5–36.5)
MCV RBC AUTO: 85 FL (ref 78–100)
MONOCYTES # BLD AUTO: 0.2 10E3/UL (ref 0–1.3)
MONOCYTES NFR BLD AUTO: 1 %
NEUTROPHILS # BLD AUTO: 15.3 10E3/UL (ref 1.6–8.3)
NEUTROPHILS NFR BLD AUTO: 95 %
NRBC # BLD AUTO: 0 10E3/UL
NRBC BLD AUTO-RTO: 0 /100
PHOSPHATE SERPL-MCNC: 4.8 MG/DL (ref 2.5–4.5)
PLATELET # BLD AUTO: 293 10E3/UL (ref 150–450)
POTASSIUM SERPL-SCNC: 4.7 MMOL/L (ref 3.4–5.3)
PROT SERPL-MCNC: 6.8 G/DL (ref 6.4–8.3)
RBC # BLD AUTO: 5.35 10E6/UL (ref 3.8–5.2)
SODIUM SERPL-SCNC: 134 MMOL/L (ref 135–145)
T4 FREE SERPL-MCNC: 0.68 NG/DL (ref 0.9–1.7)
TROPONIN T SERPL HS-MCNC: 7 NG/L
TROPONIN T SERPL HS-MCNC: 9 NG/L
TSH SERPL DL<=0.005 MIU/L-ACNC: 14.27 UIU/ML (ref 0.3–4.2)
WBC # BLD AUTO: 16 10E3/UL (ref 4–11)

## 2025-08-08 PROCEDURE — 87324 CLOSTRIDIUM AG IA: CPT | Performed by: EMERGENCY MEDICINE

## 2025-08-08 PROCEDURE — 83605 ASSAY OF LACTIC ACID: CPT | Performed by: EMERGENCY MEDICINE

## 2025-08-08 PROCEDURE — 71046 X-RAY EXAM CHEST 2 VIEWS: CPT

## 2025-08-08 PROCEDURE — 84439 ASSAY OF FREE THYROXINE: CPT | Performed by: EMERGENCY MEDICINE

## 2025-08-08 PROCEDURE — 83690 ASSAY OF LIPASE: CPT | Performed by: EMERGENCY MEDICINE

## 2025-08-08 PROCEDURE — 99291 CRITICAL CARE FIRST HOUR: CPT | Mod: 25

## 2025-08-08 PROCEDURE — 96361 HYDRATE IV INFUSION ADD-ON: CPT

## 2025-08-08 PROCEDURE — 36415 COLL VENOUS BLD VENIPUNCTURE: CPT | Performed by: EMERGENCY MEDICINE

## 2025-08-08 PROCEDURE — 82010 KETONE BODYS QUAN: CPT | Performed by: EMERGENCY MEDICINE

## 2025-08-08 PROCEDURE — 96366 THER/PROPH/DIAG IV INF ADDON: CPT

## 2025-08-08 PROCEDURE — 82962 GLUCOSE BLOOD TEST: CPT

## 2025-08-08 PROCEDURE — 99291 CRITICAL CARE FIRST HOUR: CPT | Mod: 25 | Performed by: EMERGENCY MEDICINE

## 2025-08-08 PROCEDURE — 87040 BLOOD CULTURE FOR BACTERIA: CPT | Performed by: EMERGENCY MEDICINE

## 2025-08-08 PROCEDURE — 93005 ELECTROCARDIOGRAM TRACING: CPT

## 2025-08-08 PROCEDURE — 96375 TX/PRO/DX INJ NEW DRUG ADDON: CPT

## 2025-08-08 PROCEDURE — 250N000011 HC RX IP 250 OP 636: Performed by: EMERGENCY MEDICINE

## 2025-08-08 PROCEDURE — 250N000009 HC RX 250: Performed by: EMERGENCY MEDICINE

## 2025-08-08 PROCEDURE — 87506 IADNA-DNA/RNA PROBE TQ 6-11: CPT | Performed by: EMERGENCY MEDICINE

## 2025-08-08 PROCEDURE — 84484 ASSAY OF TROPONIN QUANT: CPT | Performed by: EMERGENCY MEDICINE

## 2025-08-08 PROCEDURE — 84100 ASSAY OF PHOSPHORUS: CPT | Performed by: EMERGENCY MEDICINE

## 2025-08-08 PROCEDURE — 84443 ASSAY THYROID STIM HORMONE: CPT | Performed by: EMERGENCY MEDICINE

## 2025-08-08 PROCEDURE — 93308 TTE F-UP OR LMTD: CPT

## 2025-08-08 PROCEDURE — 250N000013 HC RX MED GY IP 250 OP 250 PS 637: Performed by: EMERGENCY MEDICINE

## 2025-08-08 PROCEDURE — 80048 BASIC METABOLIC PNL TOTAL CA: CPT | Performed by: EMERGENCY MEDICINE

## 2025-08-08 PROCEDURE — 87493 C DIFF AMPLIFIED PROBE: CPT | Performed by: EMERGENCY MEDICINE

## 2025-08-08 PROCEDURE — 99292 CRITICAL CARE ADDL 30 MIN: CPT

## 2025-08-08 PROCEDURE — 999N000157 HC STATISTIC RCP TIME EA 10 MIN

## 2025-08-08 PROCEDURE — 258N000003 HC RX IP 258 OP 636: Performed by: EMERGENCY MEDICINE

## 2025-08-08 PROCEDURE — 84155 ASSAY OF PROTEIN SERUM: CPT | Performed by: EMERGENCY MEDICINE

## 2025-08-08 PROCEDURE — 85004 AUTOMATED DIFF WBC COUNT: CPT | Performed by: EMERGENCY MEDICINE

## 2025-08-08 PROCEDURE — 96365 THER/PROPH/DIAG IV INF INIT: CPT

## 2025-08-08 PROCEDURE — 84145 PROCALCITONIN (PCT): CPT | Performed by: EMERGENCY MEDICINE

## 2025-08-08 PROCEDURE — 83735 ASSAY OF MAGNESIUM: CPT | Performed by: EMERGENCY MEDICINE

## 2025-08-08 RX ORDER — DEXTROSE MONOHYDRATE AND SODIUM CHLORIDE 5; .45 G/100ML; G/100ML
1000 INJECTION, SOLUTION INTRAVENOUS CONTINUOUS PRN
Status: DISCONTINUED | OUTPATIENT
Start: 2025-08-08 | End: 2025-08-09

## 2025-08-08 RX ORDER — DEXTROSE MONOHYDRATE AND SODIUM CHLORIDE 5; .9 G/100ML; G/100ML
INJECTION, SOLUTION INTRAVENOUS CONTINUOUS
Status: DISCONTINUED | OUTPATIENT
Start: 2025-08-08 | End: 2025-08-08

## 2025-08-08 RX ORDER — ONDANSETRON 2 MG/ML
4 INJECTION INTRAMUSCULAR; INTRAVENOUS ONCE
Status: COMPLETED | OUTPATIENT
Start: 2025-08-08 | End: 2025-08-08

## 2025-08-08 RX ORDER — MAGNESIUM SULFATE HEPTAHYDRATE 40 MG/ML
2 INJECTION, SOLUTION INTRAVENOUS ONCE
Status: COMPLETED | OUTPATIENT
Start: 2025-08-08 | End: 2025-08-09

## 2025-08-08 RX ORDER — DEXTROSE MONOHYDRATE 25 G/50ML
25-50 INJECTION, SOLUTION INTRAVENOUS
Status: DISCONTINUED | OUTPATIENT
Start: 2025-08-08 | End: 2025-08-09

## 2025-08-08 RX ORDER — LEVOTHYROXINE SODIUM 100 UG/1
100 TABLET ORAL ONCE
Status: COMPLETED | OUTPATIENT
Start: 2025-08-08 | End: 2025-08-08

## 2025-08-08 RX ADMIN — LEVOTHYROXINE SODIUM 100 MCG: 0.1 TABLET ORAL at 21:40

## 2025-08-08 RX ADMIN — MAGNESIUM SULFATE HEPTAHYDRATE 2 G: 40 INJECTION, SOLUTION INTRAVENOUS at 23:05

## 2025-08-08 RX ADMIN — ONDANSETRON 4 MG: 2 INJECTION, SOLUTION INTRAMUSCULAR; INTRAVENOUS at 20:54

## 2025-08-08 RX ADMIN — SODIUM CHLORIDE, SODIUM LACTATE, POTASSIUM CHLORIDE, AND CALCIUM CHLORIDE 1000 ML: .6; .31; .03; .02 INJECTION, SOLUTION INTRAVENOUS at 20:18

## 2025-08-08 RX ADMIN — INSULIN HUMAN 5.5 UNITS/HR: 1 INJECTION, SOLUTION INTRAVENOUS at 21:52

## 2025-08-08 RX ADMIN — SODIUM CHLORIDE, SODIUM LACTATE, POTASSIUM CHLORIDE, AND CALCIUM CHLORIDE 1000 ML: .6; .31; .03; .02 INJECTION, SOLUTION INTRAVENOUS at 21:40

## 2025-08-08 ASSESSMENT — COLUMBIA-SUICIDE SEVERITY RATING SCALE - C-SSRS
1. IN THE PAST MONTH, HAVE YOU WISHED YOU WERE DEAD OR WISHED YOU COULD GO TO SLEEP AND NOT WAKE UP?: NO
6. HAVE YOU EVER DONE ANYTHING, STARTED TO DO ANYTHING, OR PREPARED TO DO ANYTHING TO END YOUR LIFE?: NO
2. HAVE YOU ACTUALLY HAD ANY THOUGHTS OF KILLING YOURSELF IN THE PAST MONTH?: NO

## 2025-08-08 ASSESSMENT — ACTIVITIES OF DAILY LIVING (ADL)
ADLS_ACUITY_SCORE: 57

## 2025-08-09 ENCOUNTER — APPOINTMENT (OUTPATIENT)
Dept: PHYSICAL THERAPY | Facility: CLINIC | Age: 55
End: 2025-08-09
Attending: STUDENT IN AN ORGANIZED HEALTH CARE EDUCATION/TRAINING PROGRAM
Payer: MEDICARE

## 2025-08-09 PROBLEM — E11.10 DIABETIC KETOACIDOSIS WITHOUT COMA ASSOCIATED WITH TYPE 2 DIABETES MELLITUS (H): Status: ACTIVE | Noted: 2025-08-09

## 2025-08-09 LAB
ALBUMIN UR-MCNC: NEGATIVE MG/DL
ANION GAP SERPL CALCULATED.3IONS-SCNC: 12 MMOL/L (ref 7–15)
ANION GAP SERPL CALCULATED.3IONS-SCNC: 14 MMOL/L (ref 7–15)
APPEARANCE UR: CLEAR
ATRIAL RATE - MUSE: 110 BPM
B-OH-BUTYR SERPL-SCNC: 0.24 MMOL/L
BACTERIA #/AREA URNS HPF: ABNORMAL /HPF
BILIRUB UR QL STRIP: NEGATIVE
BUN SERPL-MCNC: 21.9 MG/DL (ref 6–20)
BUN SERPL-MCNC: 25.6 MG/DL (ref 6–20)
C CAYETANENSIS DNA STL QL NAA+NON-PROBE: NEGATIVE
C DIFF GDH STL QL IA: POSITIVE
C DIFF TOX A+B STL QL IA: NEGATIVE
C DIFF TOX B STL QL: POSITIVE
CALCIUM SERPL-MCNC: 8.1 MG/DL (ref 8.8–10.4)
CALCIUM SERPL-MCNC: 8.2 MG/DL (ref 8.8–10.4)
CAMPYLOBACTER DNA SPEC NAA+PROBE: NEGATIVE
CHLORIDE SERPL-SCNC: 105 MMOL/L (ref 98–107)
CHLORIDE SERPL-SCNC: 106 MMOL/L (ref 98–107)
COLOR UR AUTO: ABNORMAL
CREAT SERPL-MCNC: 0.53 MG/DL (ref 0.51–0.95)
CREAT SERPL-MCNC: 0.54 MG/DL (ref 0.51–0.95)
CRYPTOSP DNA STL QL NAA+NON-PROBE: NEGATIVE
DIASTOLIC BLOOD PRESSURE - MUSE: NORMAL MMHG
EC STX1+STX2 GENES STL QL NAA+NON-PROBE: NEGATIVE
EGFRCR SERPLBLD CKD-EPI 2021: >90 ML/MIN/1.73M2
EGFRCR SERPLBLD CKD-EPI 2021: >90 ML/MIN/1.73M2
G LAMBLIA DNA STL QL NAA+NON-PROBE: NEGATIVE
GLUCOSE BLDC GLUCOMTR-MCNC: 102 MG/DL (ref 70–99)
GLUCOSE BLDC GLUCOMTR-MCNC: 103 MG/DL (ref 70–99)
GLUCOSE BLDC GLUCOMTR-MCNC: 106 MG/DL (ref 70–99)
GLUCOSE BLDC GLUCOMTR-MCNC: 117 MG/DL (ref 70–99)
GLUCOSE BLDC GLUCOMTR-MCNC: 117 MG/DL (ref 70–99)
GLUCOSE BLDC GLUCOMTR-MCNC: 121 MG/DL (ref 70–99)
GLUCOSE BLDC GLUCOMTR-MCNC: 122 MG/DL (ref 70–99)
GLUCOSE BLDC GLUCOMTR-MCNC: 124 MG/DL (ref 70–99)
GLUCOSE BLDC GLUCOMTR-MCNC: 125 MG/DL (ref 70–99)
GLUCOSE BLDC GLUCOMTR-MCNC: 126 MG/DL (ref 70–99)
GLUCOSE BLDC GLUCOMTR-MCNC: 137 MG/DL (ref 70–99)
GLUCOSE BLDC GLUCOMTR-MCNC: 168 MG/DL (ref 70–99)
GLUCOSE BLDC GLUCOMTR-MCNC: 176 MG/DL (ref 70–99)
GLUCOSE BLDC GLUCOMTR-MCNC: 223 MG/DL (ref 70–99)
GLUCOSE BLDC GLUCOMTR-MCNC: 248 MG/DL (ref 70–99)
GLUCOSE SERPL-MCNC: 128 MG/DL (ref 70–99)
GLUCOSE SERPL-MCNC: 136 MG/DL (ref 70–99)
GLUCOSE UR STRIP-MCNC: >=1000 MG/DL
HCO3 SERPL-SCNC: 20 MMOL/L (ref 22–29)
HCO3 SERPL-SCNC: 20 MMOL/L (ref 22–29)
HGB UR QL STRIP: NEGATIVE
HOLD SPECIMEN: NORMAL
HYALINE CASTS: 1 /LPF
INTERPRETATION ECG - MUSE: NORMAL
KETONES UR STRIP-MCNC: ABNORMAL MG/DL
LABORATORY COMMENT REPORT: ABNORMAL
LEUKOCYTE ESTERASE UR QL STRIP: ABNORMAL
MRSA DNA SPEC QL NAA+PROBE: NEGATIVE
NITRATE UR QL: NEGATIVE
NOROVIRUS GI+II RNA STL QL NAA+NON-PROBE: NEGATIVE
P AXIS - MUSE: 45 DEGREES
PH UR STRIP: 5 [PH] (ref 5–7)
PHOSPHATE SERPL-MCNC: 3.1 MG/DL (ref 2.5–4.5)
PHOSPHATE SERPL-MCNC: 3.2 MG/DL (ref 2.5–4.5)
POTASSIUM SERPL-SCNC: 3.6 MMOL/L (ref 3.4–5.3)
POTASSIUM SERPL-SCNC: 3.8 MMOL/L (ref 3.4–5.3)
PR INTERVAL - MUSE: 134 MS
PROCALCITONIN SERPL IA-MCNC: 2.83 NG/ML
QRS DURATION - MUSE: 86 MS
QT - MUSE: 364 MS
QTC - MUSE: 492 MS
R AXIS - MUSE: 47 DEGREES
RBC URINE: 2 /HPF
SA TARGET DNA: NEGATIVE
SALMONELLA SP RPOD STL QL NAA+PROBE: NEGATIVE
SHIGELLA SP+EIEC IPAH ST NAA+NON-PROBE: NEGATIVE
SODIUM SERPL-SCNC: 138 MMOL/L (ref 135–145)
SODIUM SERPL-SCNC: 139 MMOL/L (ref 135–145)
SP GR UR STRIP: 1.03 (ref 1–1.03)
SQUAMOUS EPITHELIAL: 1 /HPF
SYSTOLIC BLOOD PRESSURE - MUSE: NORMAL MMHG
T AXIS - MUSE: 63 DEGREES
UROBILINOGEN UR STRIP-MCNC: NORMAL MG/DL
VENTRICULAR RATE- MUSE: 110 BPM
VIBRIO DNA SPEC NAA+PROBE: NEGATIVE
WBC URINE: 4 /HPF
Y ENTEROCOL DNA STL QL NAA+PROBE: NEGATIVE

## 2025-08-09 PROCEDURE — 80048 BASIC METABOLIC PNL TOTAL CA: CPT | Performed by: STUDENT IN AN ORGANIZED HEALTH CARE EDUCATION/TRAINING PROGRAM

## 2025-08-09 PROCEDURE — 250N000013 HC RX MED GY IP 250 OP 250 PS 637: Performed by: INTERNAL MEDICINE

## 2025-08-09 PROCEDURE — 96366 THER/PROPH/DIAG IV INF ADDON: CPT

## 2025-08-09 PROCEDURE — 97530 THERAPEUTIC ACTIVITIES: CPT | Mod: GP

## 2025-08-09 PROCEDURE — 84100 ASSAY OF PHOSPHORUS: CPT | Performed by: STUDENT IN AN ORGANIZED HEALTH CARE EDUCATION/TRAINING PROGRAM

## 2025-08-09 PROCEDURE — 250N000011 HC RX IP 250 OP 636: Performed by: EMERGENCY MEDICINE

## 2025-08-09 PROCEDURE — 250N000009 HC RX 250: Performed by: INTERNAL MEDICINE

## 2025-08-09 PROCEDURE — 36415 COLL VENOUS BLD VENIPUNCTURE: CPT | Performed by: STUDENT IN AN ORGANIZED HEALTH CARE EDUCATION/TRAINING PROGRAM

## 2025-08-09 PROCEDURE — 81003 URINALYSIS AUTO W/O SCOPE: CPT | Performed by: EMERGENCY MEDICINE

## 2025-08-09 PROCEDURE — 99223 1ST HOSP IP/OBS HIGH 75: CPT | Performed by: STUDENT IN AN ORGANIZED HEALTH CARE EDUCATION/TRAINING PROGRAM

## 2025-08-09 PROCEDURE — 258N000003 HC RX IP 258 OP 636: Performed by: STUDENT IN AN ORGANIZED HEALTH CARE EDUCATION/TRAINING PROGRAM

## 2025-08-09 PROCEDURE — 97161 PT EVAL LOW COMPLEX 20 MIN: CPT | Mod: GP

## 2025-08-09 PROCEDURE — 82010 KETONE BODYS QUAN: CPT | Performed by: STUDENT IN AN ORGANIZED HEALTH CARE EDUCATION/TRAINING PROGRAM

## 2025-08-09 PROCEDURE — 250N000011 HC RX IP 250 OP 636: Performed by: INTERNAL MEDICINE

## 2025-08-09 PROCEDURE — 250N000012 HC RX MED GY IP 250 OP 636 PS 637: Performed by: INTERNAL MEDICINE

## 2025-08-09 PROCEDURE — 80048 BASIC METABOLIC PNL TOTAL CA: CPT | Performed by: INTERNAL MEDICINE

## 2025-08-09 PROCEDURE — 250N000011 HC RX IP 250 OP 636: Performed by: STUDENT IN AN ORGANIZED HEALTH CARE EDUCATION/TRAINING PROGRAM

## 2025-08-09 PROCEDURE — 84100 ASSAY OF PHOSPHORUS: CPT | Performed by: INTERNAL MEDICINE

## 2025-08-09 PROCEDURE — 82962 GLUCOSE BLOOD TEST: CPT

## 2025-08-09 PROCEDURE — 87641 MR-STAPH DNA AMP PROBE: CPT | Performed by: STUDENT IN AN ORGANIZED HEALTH CARE EDUCATION/TRAINING PROGRAM

## 2025-08-09 PROCEDURE — 99207 PR NO BILLABLE SERVICE THIS VISIT: CPT | Performed by: INTERNAL MEDICINE

## 2025-08-09 PROCEDURE — 36416 COLLJ CAPILLARY BLOOD SPEC: CPT | Performed by: INTERNAL MEDICINE

## 2025-08-09 PROCEDURE — 250N000013 HC RX MED GY IP 250 OP 250 PS 637: Performed by: STUDENT IN AN ORGANIZED HEALTH CARE EDUCATION/TRAINING PROGRAM

## 2025-08-09 PROCEDURE — 120N000001 HC R&B MED SURG/OB

## 2025-08-09 PROCEDURE — 250N000013 HC RX MED GY IP 250 OP 250 PS 637: Performed by: EMERGENCY MEDICINE

## 2025-08-09 RX ORDER — BUPROPION HYDROCHLORIDE 150 MG/1
150 TABLET ORAL EVERY MORNING
Status: DISCONTINUED | OUTPATIENT
Start: 2025-08-09 | End: 2025-08-12 | Stop reason: HOSPADM

## 2025-08-09 RX ORDER — PIPERACILLIN SODIUM, TAZOBACTAM SODIUM 4; .5 G/20ML; G/20ML
4.5 INJECTION, POWDER, LYOPHILIZED, FOR SOLUTION INTRAVENOUS ONCE
Status: COMPLETED | OUTPATIENT
Start: 2025-08-09 | End: 2025-08-09

## 2025-08-09 RX ORDER — DEXTROSE MONOHYDRATE 25 G/50ML
25-50 INJECTION, SOLUTION INTRAVENOUS
Status: DISCONTINUED | OUTPATIENT
Start: 2025-08-09 | End: 2025-08-12 | Stop reason: HOSPADM

## 2025-08-09 RX ORDER — SODIUM CHLORIDE AND POTASSIUM CHLORIDE 150; 450 MG/100ML; MG/100ML
INJECTION, SOLUTION INTRAVENOUS CONTINUOUS
Status: DISCONTINUED | OUTPATIENT
Start: 2025-08-09 | End: 2025-08-09

## 2025-08-09 RX ORDER — DEXTROSE MONOHYDRATE, SODIUM CHLORIDE, AND POTASSIUM CHLORIDE 50; 1.49; 4.5 G/1000ML; G/1000ML; G/1000ML
INJECTION, SOLUTION INTRAVENOUS CONTINUOUS
Status: ACTIVE | OUTPATIENT
Start: 2025-08-09 | End: 2025-08-09

## 2025-08-09 RX ORDER — PIPERACILLIN SODIUM, TAZOBACTAM SODIUM 4; .5 G/20ML; G/20ML
4.5 INJECTION, POWDER, LYOPHILIZED, FOR SOLUTION INTRAVENOUS EVERY 6 HOURS
Status: DISCONTINUED | OUTPATIENT
Start: 2025-08-09 | End: 2025-08-09

## 2025-08-09 RX ORDER — LIDOCAINE 40 MG/G
CREAM TOPICAL
Status: DISCONTINUED | OUTPATIENT
Start: 2025-08-09 | End: 2025-08-12 | Stop reason: HOSPADM

## 2025-08-09 RX ORDER — AMOXICILLIN 250 MG
2 CAPSULE ORAL 2 TIMES DAILY PRN
Status: DISCONTINUED | OUTPATIENT
Start: 2025-08-09 | End: 2025-08-12 | Stop reason: HOSPADM

## 2025-08-09 RX ORDER — BUPROPION HYDROCHLORIDE 150 MG/1
150 TABLET ORAL EVERY MORNING
COMMUNITY
Start: 2025-07-21

## 2025-08-09 RX ORDER — RIZATRIPTAN BENZOATE 5 MG/1
10 TABLET, ORALLY DISINTEGRATING ORAL
Status: DISCONTINUED | OUTPATIENT
Start: 2025-08-09 | End: 2025-08-12 | Stop reason: HOSPADM

## 2025-08-09 RX ORDER — OXYCODONE HYDROCHLORIDE 10 MG/1
10 TABLET ORAL
Refills: 0 | Status: DISCONTINUED | OUTPATIENT
Start: 2025-08-09 | End: 2025-08-09

## 2025-08-09 RX ORDER — NALOXONE HYDROCHLORIDE 0.4 MG/ML
0.4 INJECTION, SOLUTION INTRAMUSCULAR; INTRAVENOUS; SUBCUTANEOUS
Status: DISCONTINUED | OUTPATIENT
Start: 2025-08-09 | End: 2025-08-12 | Stop reason: HOSPADM

## 2025-08-09 RX ORDER — VANCOMYCIN HYDROCHLORIDE 125 MG/1
125 CAPSULE ORAL 4 TIMES DAILY
Status: DISCONTINUED | OUTPATIENT
Start: 2025-08-09 | End: 2025-08-12 | Stop reason: HOSPADM

## 2025-08-09 RX ORDER — NALOXONE HYDROCHLORIDE 0.4 MG/ML
0.2 INJECTION, SOLUTION INTRAMUSCULAR; INTRAVENOUS; SUBCUTANEOUS
Status: DISCONTINUED | OUTPATIENT
Start: 2025-08-09 | End: 2025-08-12 | Stop reason: HOSPADM

## 2025-08-09 RX ORDER — CYCLOBENZAPRINE HCL 10 MG
10 TABLET ORAL 3 TIMES DAILY
Status: DISCONTINUED | OUTPATIENT
Start: 2025-08-09 | End: 2025-08-12 | Stop reason: HOSPADM

## 2025-08-09 RX ORDER — LEVOTHYROXINE SODIUM 112 UG/1
112 TABLET ORAL
Status: DISCONTINUED | OUTPATIENT
Start: 2025-08-09 | End: 2025-08-12 | Stop reason: HOSPADM

## 2025-08-09 RX ORDER — OXYCODONE HYDROCHLORIDE 5 MG/1
10 TABLET ORAL ONCE
Refills: 0 | Status: COMPLETED | OUTPATIENT
Start: 2025-08-09 | End: 2025-08-09

## 2025-08-09 RX ORDER — ALBUTEROL SULFATE 90 UG/1
2 INHALANT RESPIRATORY (INHALATION) EVERY 6 HOURS PRN
Status: DISCONTINUED | OUTPATIENT
Start: 2025-08-09 | End: 2025-08-12 | Stop reason: HOSPADM

## 2025-08-09 RX ORDER — LIDOCAINE 40 MG/G
CREAM TOPICAL
Status: DISCONTINUED | OUTPATIENT
Start: 2025-08-09 | End: 2025-08-09

## 2025-08-09 RX ORDER — NICOTINE POLACRILEX 4 MG
15-30 LOZENGE BUCCAL
Status: DISCONTINUED | OUTPATIENT
Start: 2025-08-09 | End: 2025-08-12 | Stop reason: HOSPADM

## 2025-08-09 RX ORDER — ONDANSETRON 4 MG/1
4 TABLET, ORALLY DISINTEGRATING ORAL EVERY 6 HOURS PRN
Status: DISCONTINUED | OUTPATIENT
Start: 2025-08-09 | End: 2025-08-09

## 2025-08-09 RX ORDER — ONDANSETRON 4 MG/1
4 TABLET, ORALLY DISINTEGRATING ORAL EVERY 12 HOURS PRN
Status: DISCONTINUED | OUTPATIENT
Start: 2025-08-09 | End: 2025-08-10

## 2025-08-09 RX ORDER — CALCIUM CARBONATE 500 MG/1
1000 TABLET, CHEWABLE ORAL 4 TIMES DAILY PRN
Status: DISCONTINUED | OUTPATIENT
Start: 2025-08-09 | End: 2025-08-12 | Stop reason: HOSPADM

## 2025-08-09 RX ORDER — OXYCODONE HYDROCHLORIDE 10 MG/1
10 TABLET ORAL
Refills: 0 | Status: DISCONTINUED | OUTPATIENT
Start: 2025-08-09 | End: 2025-08-12 | Stop reason: HOSPADM

## 2025-08-09 RX ORDER — ONDANSETRON 2 MG/ML
4 INJECTION INTRAMUSCULAR; INTRAVENOUS EVERY 6 HOURS PRN
Status: DISCONTINUED | OUTPATIENT
Start: 2025-08-09 | End: 2025-08-10

## 2025-08-09 RX ORDER — SCOPOLAMINE 1 MG/3D
1 PATCH, EXTENDED RELEASE TRANSDERMAL
Status: DISCONTINUED | OUTPATIENT
Start: 2025-08-09 | End: 2025-08-12 | Stop reason: HOSPADM

## 2025-08-09 RX ORDER — AMOXICILLIN 250 MG
1 CAPSULE ORAL 2 TIMES DAILY PRN
Status: DISCONTINUED | OUTPATIENT
Start: 2025-08-09 | End: 2025-08-12 | Stop reason: HOSPADM

## 2025-08-09 RX ORDER — LEVOTHYROXINE SODIUM 100 UG/1
100 TABLET ORAL DAILY
Status: DISCONTINUED | OUTPATIENT
Start: 2025-08-09 | End: 2025-08-09

## 2025-08-09 RX ORDER — CETIRIZINE HYDROCHLORIDE 10 MG/1
10 TABLET ORAL EVERY EVENING
Status: DISCONTINUED | OUTPATIENT
Start: 2025-08-09 | End: 2025-08-12 | Stop reason: HOSPADM

## 2025-08-09 RX ORDER — IPRATROPIUM BROMIDE AND ALBUTEROL SULFATE 2.5; .5 MG/3ML; MG/3ML
1 SOLUTION RESPIRATORY (INHALATION) EVERY 6 HOURS PRN
Status: DISCONTINUED | OUTPATIENT
Start: 2025-08-09 | End: 2025-08-12 | Stop reason: HOSPADM

## 2025-08-09 RX ADMIN — CYCLOBENZAPRINE 10 MG: 10 TABLET, FILM COATED ORAL at 17:02

## 2025-08-09 RX ADMIN — CYCLOBENZAPRINE 10 MG: 10 TABLET, FILM COATED ORAL at 22:20

## 2025-08-09 RX ADMIN — OXYCODONE HYDROCHLORIDE 10 MG: 10 TABLET ORAL at 11:05

## 2025-08-09 RX ADMIN — APIXABAN 5 MG: 5 TABLET, FILM COATED ORAL at 20:19

## 2025-08-09 RX ADMIN — CYCLOBENZAPRINE 10 MG: 10 TABLET, FILM COATED ORAL at 11:04

## 2025-08-09 RX ADMIN — ONDANSETRON 4 MG: 2 INJECTION, SOLUTION INTRAMUSCULAR; INTRAVENOUS at 03:34

## 2025-08-09 RX ADMIN — OXYCODONE HYDROCHLORIDE 10 MG: 10 TABLET ORAL at 14:41

## 2025-08-09 RX ADMIN — CETIRIZINE HYDROCHLORIDE 10 MG: 10 TABLET, FILM COATED ORAL at 20:19

## 2025-08-09 RX ADMIN — APIXABAN 5 MG: 5 TABLET, FILM COATED ORAL at 00:42

## 2025-08-09 RX ADMIN — Medication 1750 MG: at 02:15

## 2025-08-09 RX ADMIN — VANCOMYCIN HYDROCHLORIDE 125 MG: 125 CAPSULE ORAL at 13:15

## 2025-08-09 RX ADMIN — OXYCODONE HYDROCHLORIDE 10 MG: 10 TABLET ORAL at 09:15

## 2025-08-09 RX ADMIN — VANCOMYCIN HYDROCHLORIDE 125 MG: 125 CAPSULE ORAL at 03:05

## 2025-08-09 RX ADMIN — OXYCODONE HYDROCHLORIDE 10 MG: 10 TABLET ORAL at 22:20

## 2025-08-09 RX ADMIN — OXYCODONE HYDROCHLORIDE 10 MG: 10 TABLET ORAL at 04:58

## 2025-08-09 RX ADMIN — INSULIN GLARGINE 10 UNITS: 100 INJECTION, SOLUTION SUBCUTANEOUS at 11:05

## 2025-08-09 RX ADMIN — APIXABAN 5 MG: 5 TABLET, FILM COATED ORAL at 09:24

## 2025-08-09 RX ADMIN — OXYCODONE HYDROCHLORIDE 10 MG: 5 TABLET ORAL at 00:41

## 2025-08-09 RX ADMIN — VANCOMYCIN HYDROCHLORIDE 125 MG: 125 CAPSULE ORAL at 09:24

## 2025-08-09 RX ADMIN — SCOPOLAMINE 1 PATCH: 1.5 PATCH, EXTENDED RELEASE TRANSDERMAL at 13:15

## 2025-08-09 RX ADMIN — PIPERACILLIN AND TAZOBACTAM 4.5 G: 4; .5 INJECTION, POWDER, FOR SOLUTION INTRAVENOUS at 00:41

## 2025-08-09 RX ADMIN — VANCOMYCIN HYDROCHLORIDE 125 MG: 125 CAPSULE ORAL at 17:02

## 2025-08-09 RX ADMIN — BUPROPION HYDROCHLORIDE 150 MG: 150 TABLET, EXTENDED RELEASE ORAL at 11:05

## 2025-08-09 RX ADMIN — LEVOTHYROXINE SODIUM 112 MCG: 0.11 TABLET ORAL at 22:20

## 2025-08-09 RX ADMIN — INSULIN ASPART 3 UNITS: 100 INJECTION, SOLUTION INTRAVENOUS; SUBCUTANEOUS at 18:34

## 2025-08-09 RX ADMIN — VANCOMYCIN HYDROCHLORIDE 125 MG: 125 CAPSULE ORAL at 22:20

## 2025-08-09 RX ADMIN — MICONAZOLE NITRATE: 20 POWDER TOPICAL at 17:02

## 2025-08-09 RX ADMIN — INSULIN ASPART 1 UNITS: 100 INJECTION, SOLUTION INTRAVENOUS; SUBCUTANEOUS at 11:51

## 2025-08-09 RX ADMIN — ONDANSETRON 4 MG: 2 INJECTION, SOLUTION INTRAMUSCULAR; INTRAVENOUS at 11:50

## 2025-08-09 RX ADMIN — DEXTROSE, SODIUM CHLORIDE, AND POTASSIUM CHLORIDE: 5; .45; .15 INJECTION INTRAVENOUS at 03:05

## 2025-08-09 RX ADMIN — PIPERACILLIN AND TAZOBACTAM 4.5 G: 4; .5 INJECTION, POWDER, FOR SOLUTION INTRAVENOUS at 06:09

## 2025-08-09 RX ADMIN — OXYCODONE HYDROCHLORIDE 10 MG: 10 TABLET ORAL at 17:02

## 2025-08-09 RX ADMIN — ALBUTEROL SULFATE 2 PUFF: 90 AEROSOL, METERED RESPIRATORY (INHALATION) at 23:51

## 2025-08-09 ASSESSMENT — ACTIVITIES OF DAILY LIVING (ADL)
ADLS_ACUITY_SCORE: 54
ADLS_ACUITY_SCORE: 50
ADLS_ACUITY_SCORE: 57
ADLS_ACUITY_SCORE: 57
ADLS_ACUITY_SCORE: 54
ADLS_ACUITY_SCORE: 57
ADLS_ACUITY_SCORE: 54
ADLS_ACUITY_SCORE: 57
ADLS_ACUITY_SCORE: 54
ADLS_ACUITY_SCORE: 51
ADLS_ACUITY_SCORE: 54
ADLS_ACUITY_SCORE: 57
ADLS_ACUITY_SCORE: 57
ADLS_ACUITY_SCORE: 54
ADLS_ACUITY_SCORE: 54
ADLS_ACUITY_SCORE: 57
ADLS_ACUITY_SCORE: 57
ADLS_ACUITY_SCORE: 50
ADLS_ACUITY_SCORE: 57
ADLS_ACUITY_SCORE: 57
DEPENDENT_IADLS:: INDEPENDENT
ADLS_ACUITY_SCORE: 54

## 2025-08-10 LAB
ANION GAP SERPL CALCULATED.3IONS-SCNC: 11 MMOL/L (ref 7–15)
BUN SERPL-MCNC: 14.5 MG/DL (ref 6–20)
CALCIUM SERPL-MCNC: 8.4 MG/DL (ref 8.8–10.4)
CHLORIDE SERPL-SCNC: 103 MMOL/L (ref 98–107)
CREAT SERPL-MCNC: 0.5 MG/DL (ref 0.51–0.95)
EGFRCR SERPLBLD CKD-EPI 2021: >90 ML/MIN/1.73M2
GLUCOSE BLDC GLUCOMTR-MCNC: 117 MG/DL (ref 70–99)
GLUCOSE BLDC GLUCOMTR-MCNC: 122 MG/DL (ref 70–99)
GLUCOSE BLDC GLUCOMTR-MCNC: 152 MG/DL (ref 70–99)
GLUCOSE BLDC GLUCOMTR-MCNC: 153 MG/DL (ref 70–99)
GLUCOSE BLDC GLUCOMTR-MCNC: 175 MG/DL (ref 70–99)
GLUCOSE BLDC GLUCOMTR-MCNC: 201 MG/DL (ref 70–99)
GLUCOSE SERPL-MCNC: 118 MG/DL (ref 70–99)
HCO3 SERPL-SCNC: 24 MMOL/L (ref 22–29)
POTASSIUM SERPL-SCNC: 3.5 MMOL/L (ref 3.4–5.3)
SODIUM SERPL-SCNC: 138 MMOL/L (ref 135–145)

## 2025-08-10 PROCEDURE — 999N000111 HC STATISTIC OT IP EVAL DEFER

## 2025-08-10 PROCEDURE — 120N000001 HC R&B MED SURG/OB

## 2025-08-10 PROCEDURE — 99233 SBSQ HOSP IP/OBS HIGH 50: CPT | Performed by: INTERNAL MEDICINE

## 2025-08-10 PROCEDURE — 80048 BASIC METABOLIC PNL TOTAL CA: CPT | Performed by: INTERNAL MEDICINE

## 2025-08-10 PROCEDURE — 250N000013 HC RX MED GY IP 250 OP 250 PS 637: Performed by: STUDENT IN AN ORGANIZED HEALTH CARE EDUCATION/TRAINING PROGRAM

## 2025-08-10 PROCEDURE — 250N000013 HC RX MED GY IP 250 OP 250 PS 637: Performed by: INTERNAL MEDICINE

## 2025-08-10 PROCEDURE — 250N000011 HC RX IP 250 OP 636: Performed by: INTERNAL MEDICINE

## 2025-08-10 PROCEDURE — 36415 COLL VENOUS BLD VENIPUNCTURE: CPT | Performed by: INTERNAL MEDICINE

## 2025-08-10 PROCEDURE — 250N000011 HC RX IP 250 OP 636: Performed by: STUDENT IN AN ORGANIZED HEALTH CARE EDUCATION/TRAINING PROGRAM

## 2025-08-10 RX ORDER — ONDANSETRON 4 MG/1
4 TABLET, ORALLY DISINTEGRATING ORAL EVERY 12 HOURS PRN
Status: DISCONTINUED | OUTPATIENT
Start: 2025-08-10 | End: 2025-08-12 | Stop reason: HOSPADM

## 2025-08-10 RX ORDER — METOCLOPRAMIDE HYDROCHLORIDE 5 MG/ML
5 INJECTION INTRAMUSCULAR; INTRAVENOUS ONCE
Status: COMPLETED | OUTPATIENT
Start: 2025-08-10 | End: 2025-08-10

## 2025-08-10 RX ORDER — ONDANSETRON 2 MG/ML
4 INJECTION INTRAMUSCULAR; INTRAVENOUS EVERY 6 HOURS PRN
Status: DISCONTINUED | OUTPATIENT
Start: 2025-08-10 | End: 2025-08-12 | Stop reason: HOSPADM

## 2025-08-10 RX ORDER — METOCLOPRAMIDE HYDROCHLORIDE 5 MG/ML
5 INJECTION INTRAMUSCULAR; INTRAVENOUS EVERY 8 HOURS PRN
Status: DISCONTINUED | OUTPATIENT
Start: 2025-08-10 | End: 2025-08-12 | Stop reason: HOSPADM

## 2025-08-10 RX ADMIN — OXYCODONE HYDROCHLORIDE 10 MG: 10 TABLET ORAL at 18:33

## 2025-08-10 RX ADMIN — ALBUTEROL SULFATE 2 PUFF: 90 AEROSOL, METERED RESPIRATORY (INHALATION) at 13:05

## 2025-08-10 RX ADMIN — CYCLOBENZAPRINE 10 MG: 10 TABLET, FILM COATED ORAL at 08:49

## 2025-08-10 RX ADMIN — METOCLOPRAMIDE 5 MG: 5 INJECTION, SOLUTION INTRAMUSCULAR; INTRAVENOUS at 13:06

## 2025-08-10 RX ADMIN — ALBUTEROL SULFATE 2 PUFF: 90 AEROSOL, METERED RESPIRATORY (INHALATION) at 18:33

## 2025-08-10 RX ADMIN — VANCOMYCIN HYDROCHLORIDE 125 MG: 125 CAPSULE ORAL at 18:33

## 2025-08-10 RX ADMIN — CYCLOBENZAPRINE 10 MG: 10 TABLET, FILM COATED ORAL at 21:44

## 2025-08-10 RX ADMIN — OXYCODONE HYDROCHLORIDE 10 MG: 10 TABLET ORAL at 15:38

## 2025-08-10 RX ADMIN — OXYCODONE HYDROCHLORIDE 10 MG: 10 TABLET ORAL at 21:45

## 2025-08-10 RX ADMIN — LEVOTHYROXINE SODIUM 112 MCG: 0.11 TABLET ORAL at 21:44

## 2025-08-10 RX ADMIN — CYCLOBENZAPRINE 10 MG: 10 TABLET, FILM COATED ORAL at 15:38

## 2025-08-10 RX ADMIN — OXYCODONE HYDROCHLORIDE 10 MG: 10 TABLET ORAL at 13:06

## 2025-08-10 RX ADMIN — VANCOMYCIN HYDROCHLORIDE 125 MG: 125 CAPSULE ORAL at 21:45

## 2025-08-10 RX ADMIN — CETIRIZINE HYDROCHLORIDE 10 MG: 10 TABLET, FILM COATED ORAL at 21:45

## 2025-08-10 RX ADMIN — ALBUTEROL SULFATE 2 PUFF: 90 AEROSOL, METERED RESPIRATORY (INHALATION) at 08:49

## 2025-08-10 RX ADMIN — BUPROPION HYDROCHLORIDE 150 MG: 150 TABLET, EXTENDED RELEASE ORAL at 08:49

## 2025-08-10 RX ADMIN — APIXABAN 5 MG: 5 TABLET, FILM COATED ORAL at 21:44

## 2025-08-10 RX ADMIN — VANCOMYCIN HYDROCHLORIDE 125 MG: 125 CAPSULE ORAL at 08:49

## 2025-08-10 RX ADMIN — APIXABAN 5 MG: 5 TABLET, FILM COATED ORAL at 08:50

## 2025-08-10 RX ADMIN — MICONAZOLE NITRATE: 20 POWDER TOPICAL at 08:50

## 2025-08-10 RX ADMIN — METOCLOPRAMIDE 5 MG: 5 INJECTION, SOLUTION INTRAMUSCULAR; INTRAVENOUS at 00:29

## 2025-08-10 RX ADMIN — VANCOMYCIN HYDROCHLORIDE 125 MG: 125 CAPSULE ORAL at 13:06

## 2025-08-10 RX ADMIN — OXYCODONE HYDROCHLORIDE 10 MG: 10 TABLET ORAL at 08:49

## 2025-08-10 ASSESSMENT — ACTIVITIES OF DAILY LIVING (ADL)
ADLS_ACUITY_SCORE: 51
ADLS_ACUITY_SCORE: 53
ADLS_ACUITY_SCORE: 51

## 2025-08-11 ENCOUNTER — APPOINTMENT (OUTPATIENT)
Dept: PHYSICAL THERAPY | Facility: CLINIC | Age: 55
DRG: 871 | End: 2025-08-11
Payer: MEDICARE

## 2025-08-11 LAB
ANION GAP SERPL CALCULATED.3IONS-SCNC: 12 MMOL/L (ref 7–15)
BUN SERPL-MCNC: 9.4 MG/DL (ref 6–20)
CALCIUM SERPL-MCNC: 8.7 MG/DL (ref 8.8–10.4)
CHLORIDE SERPL-SCNC: 104 MMOL/L (ref 98–107)
CREAT SERPL-MCNC: 0.5 MG/DL (ref 0.51–0.95)
EGFRCR SERPLBLD CKD-EPI 2021: >90 ML/MIN/1.73M2
GLUCOSE BLDC GLUCOMTR-MCNC: 102 MG/DL (ref 70–99)
GLUCOSE BLDC GLUCOMTR-MCNC: 132 MG/DL (ref 70–99)
GLUCOSE BLDC GLUCOMTR-MCNC: 136 MG/DL (ref 70–99)
GLUCOSE BLDC GLUCOMTR-MCNC: 147 MG/DL (ref 70–99)
GLUCOSE BLDC GLUCOMTR-MCNC: 174 MG/DL (ref 70–99)
GLUCOSE SERPL-MCNC: 109 MG/DL (ref 70–99)
HCO3 SERPL-SCNC: 22 MMOL/L (ref 22–29)
HOLD SPECIMEN: NORMAL
POTASSIUM SERPL-SCNC: 3.4 MMOL/L (ref 3.4–5.3)
POTASSIUM SERPL-SCNC: 3.9 MMOL/L (ref 3.4–5.3)
SODIUM SERPL-SCNC: 138 MMOL/L (ref 135–145)

## 2025-08-11 PROCEDURE — 250N000013 HC RX MED GY IP 250 OP 250 PS 637: Performed by: STUDENT IN AN ORGANIZED HEALTH CARE EDUCATION/TRAINING PROGRAM

## 2025-08-11 PROCEDURE — 80048 BASIC METABOLIC PNL TOTAL CA: CPT | Performed by: INTERNAL MEDICINE

## 2025-08-11 PROCEDURE — 99232 SBSQ HOSP IP/OBS MODERATE 35: CPT | Performed by: INTERNAL MEDICINE

## 2025-08-11 PROCEDURE — 250N000013 HC RX MED GY IP 250 OP 250 PS 637: Performed by: INTERNAL MEDICINE

## 2025-08-11 PROCEDURE — 84132 ASSAY OF SERUM POTASSIUM: CPT | Performed by: INTERNAL MEDICINE

## 2025-08-11 PROCEDURE — 250N000011 HC RX IP 250 OP 636: Performed by: INTERNAL MEDICINE

## 2025-08-11 PROCEDURE — 97116 GAIT TRAINING THERAPY: CPT | Mod: GP | Performed by: PHYSICAL THERAPIST

## 2025-08-11 PROCEDURE — 120N000001 HC R&B MED SURG/OB

## 2025-08-11 PROCEDURE — 36415 COLL VENOUS BLD VENIPUNCTURE: CPT | Performed by: INTERNAL MEDICINE

## 2025-08-11 PROCEDURE — 97530 THERAPEUTIC ACTIVITIES: CPT | Mod: GP | Performed by: PHYSICAL THERAPIST

## 2025-08-11 PROCEDURE — 99222 1ST HOSP IP/OBS MODERATE 55: CPT | Performed by: PHYSICIAN ASSISTANT

## 2025-08-11 RX ORDER — POTASSIUM CHLORIDE 1500 MG/1
40 TABLET, EXTENDED RELEASE ORAL ONCE
Status: COMPLETED | OUTPATIENT
Start: 2025-08-11 | End: 2025-08-11

## 2025-08-11 RX ADMIN — INSULIN ASPART 4 UNITS: 100 INJECTION, SOLUTION INTRAVENOUS; SUBCUTANEOUS at 18:49

## 2025-08-11 RX ADMIN — ONDANSETRON 4 MG: 4 TABLET, ORALLY DISINTEGRATING ORAL at 12:28

## 2025-08-11 RX ADMIN — VANCOMYCIN HYDROCHLORIDE 125 MG: 125 CAPSULE ORAL at 22:03

## 2025-08-11 RX ADMIN — CYCLOBENZAPRINE 10 MG: 10 TABLET, FILM COATED ORAL at 08:29

## 2025-08-11 RX ADMIN — OXYCODONE HYDROCHLORIDE 10 MG: 10 TABLET ORAL at 19:33

## 2025-08-11 RX ADMIN — VANCOMYCIN HYDROCHLORIDE 125 MG: 125 CAPSULE ORAL at 18:26

## 2025-08-11 RX ADMIN — ALBUTEROL SULFATE 2 PUFF: 90 AEROSOL, METERED RESPIRATORY (INHALATION) at 13:20

## 2025-08-11 RX ADMIN — BUPROPION HYDROCHLORIDE 150 MG: 150 TABLET, EXTENDED RELEASE ORAL at 08:29

## 2025-08-11 RX ADMIN — VANCOMYCIN HYDROCHLORIDE 125 MG: 125 CAPSULE ORAL at 08:29

## 2025-08-11 RX ADMIN — ONDANSETRON 4 MG: 4 TABLET, ORALLY DISINTEGRATING ORAL at 00:25

## 2025-08-11 RX ADMIN — METOCLOPRAMIDE 5 MG: 5 INJECTION, SOLUTION INTRAMUSCULAR; INTRAVENOUS at 14:36

## 2025-08-11 RX ADMIN — APIXABAN 5 MG: 5 TABLET, FILM COATED ORAL at 08:29

## 2025-08-11 RX ADMIN — MICONAZOLE NITRATE: 20 POWDER TOPICAL at 22:09

## 2025-08-11 RX ADMIN — ALBUTEROL SULFATE 2 PUFF: 90 AEROSOL, METERED RESPIRATORY (INHALATION) at 08:28

## 2025-08-11 RX ADMIN — OXYCODONE HYDROCHLORIDE 10 MG: 10 TABLET ORAL at 04:34

## 2025-08-11 RX ADMIN — VANCOMYCIN HYDROCHLORIDE 125 MG: 125 CAPSULE ORAL at 13:19

## 2025-08-11 RX ADMIN — INSULIN ASPART 3 UNITS: 100 INJECTION, SOLUTION INTRAVENOUS; SUBCUTANEOUS at 13:21

## 2025-08-11 RX ADMIN — POTASSIUM CHLORIDE 40 MEQ: 1500 TABLET, EXTENDED RELEASE ORAL at 08:29

## 2025-08-11 RX ADMIN — CYCLOBENZAPRINE 10 MG: 10 TABLET, FILM COATED ORAL at 16:26

## 2025-08-11 RX ADMIN — CETIRIZINE HYDROCHLORIDE 10 MG: 10 TABLET, FILM COATED ORAL at 19:32

## 2025-08-11 RX ADMIN — OXYCODONE HYDROCHLORIDE 10 MG: 10 TABLET ORAL at 14:39

## 2025-08-11 RX ADMIN — METOCLOPRAMIDE 5 MG: 5 INJECTION, SOLUTION INTRAMUSCULAR; INTRAVENOUS at 04:39

## 2025-08-11 RX ADMIN — APIXABAN 5 MG: 5 TABLET, FILM COATED ORAL at 22:03

## 2025-08-11 RX ADMIN — CYCLOBENZAPRINE 10 MG: 10 TABLET, FILM COATED ORAL at 22:03

## 2025-08-11 RX ADMIN — OXYCODONE HYDROCHLORIDE 10 MG: 10 TABLET ORAL at 08:41

## 2025-08-11 RX ADMIN — LEVOTHYROXINE SODIUM 112 MCG: 0.11 TABLET ORAL at 22:03

## 2025-08-11 ASSESSMENT — ACTIVITIES OF DAILY LIVING (ADL)
ADLS_ACUITY_SCORE: 58
ADLS_ACUITY_SCORE: 51
ADLS_ACUITY_SCORE: 58
ADLS_ACUITY_SCORE: 51
ADLS_ACUITY_SCORE: 58
ADLS_ACUITY_SCORE: 51
ADLS_ACUITY_SCORE: 56
ADLS_ACUITY_SCORE: 51
ADLS_ACUITY_SCORE: 58
ADLS_ACUITY_SCORE: 51
ADLS_ACUITY_SCORE: 58
ADLS_ACUITY_SCORE: 58
ADLS_ACUITY_SCORE: 57
ADLS_ACUITY_SCORE: 58
ADLS_ACUITY_SCORE: 57
ADLS_ACUITY_SCORE: 57
ADLS_ACUITY_SCORE: 51
ADLS_ACUITY_SCORE: 51
ADLS_ACUITY_SCORE: 56
ADLS_ACUITY_SCORE: 51
ADLS_ACUITY_SCORE: 57

## 2025-08-12 ENCOUNTER — LAB REQUISITION (OUTPATIENT)
Dept: LAB | Facility: CLINIC | Age: 55
End: 2025-08-12
Payer: MEDICARE

## 2025-08-12 VITALS
OXYGEN SATURATION: 97 % | HEIGHT: 66 IN | DIASTOLIC BLOOD PRESSURE: 81 MMHG | RESPIRATION RATE: 18 BRPM | WEIGHT: 166.01 LBS | TEMPERATURE: 97.8 F | HEART RATE: 94 BPM | BODY MASS INDEX: 26.68 KG/M2 | SYSTOLIC BLOOD PRESSURE: 136 MMHG

## 2025-08-12 DIAGNOSIS — Z11.1 ENCOUNTER FOR SCREENING FOR RESPIRATORY TUBERCULOSIS: ICD-10-CM

## 2025-08-12 PROBLEM — I82.90 VTE (VENOUS THROMBOEMBOLISM): Status: ACTIVE | Noted: 2025-08-12

## 2025-08-12 PROBLEM — E03.9 ACQUIRED HYPOTHYROIDISM: Status: ACTIVE | Noted: 2025-08-12

## 2025-08-12 PROBLEM — A04.72 C. DIFFICILE DIARRHEA: Status: ACTIVE | Noted: 2025-08-12

## 2025-08-12 PROBLEM — F11.20 UNCOMPLICATED OPIOID DEPENDENCE (H): Status: ACTIVE | Noted: 2025-08-12

## 2025-08-12 PROBLEM — G89.4 CHRONIC PAIN SYNDROME: Status: ACTIVE | Noted: 2025-08-12

## 2025-08-12 PROBLEM — A41.9 SEPSIS WITHOUT ACUTE ORGAN DYSFUNCTION (H): Status: ACTIVE | Noted: 2025-08-12

## 2025-08-12 PROBLEM — E11.10 LACTIC ACIDOSIS DUE TO DIABETES MELLITUS (H): Status: ACTIVE | Noted: 2025-08-12

## 2025-08-12 PROBLEM — M62.81 GENERALIZED MUSCLE WEAKNESS: Status: ACTIVE | Noted: 2025-08-12

## 2025-08-12 LAB
ANION GAP SERPL CALCULATED.3IONS-SCNC: 11 MMOL/L (ref 7–15)
BUN SERPL-MCNC: 9.4 MG/DL (ref 6–20)
CALCIUM SERPL-MCNC: 8.5 MG/DL (ref 8.8–10.4)
CHLORIDE SERPL-SCNC: 107 MMOL/L (ref 98–107)
CREAT SERPL-MCNC: 0.41 MG/DL (ref 0.51–0.95)
EGFRCR SERPLBLD CKD-EPI 2021: >90 ML/MIN/1.73M2
GLUCOSE BLDC GLUCOMTR-MCNC: 125 MG/DL (ref 70–99)
GLUCOSE BLDC GLUCOMTR-MCNC: 135 MG/DL (ref 70–99)
GLUCOSE BLDC GLUCOMTR-MCNC: 153 MG/DL (ref 70–99)
GLUCOSE BLDC GLUCOMTR-MCNC: 234 MG/DL (ref 70–99)
GLUCOSE SERPL-MCNC: 131 MG/DL (ref 70–99)
HCO3 SERPL-SCNC: 23 MMOL/L (ref 22–29)
POTASSIUM SERPL-SCNC: 3.5 MMOL/L (ref 3.4–5.3)
SODIUM SERPL-SCNC: 141 MMOL/L (ref 135–145)

## 2025-08-12 PROCEDURE — 99232 SBSQ HOSP IP/OBS MODERATE 35: CPT | Performed by: PHYSICIAN ASSISTANT

## 2025-08-12 PROCEDURE — 99239 HOSP IP/OBS DSCHRG MGMT >30: CPT | Performed by: INTERNAL MEDICINE

## 2025-08-12 PROCEDURE — 36415 COLL VENOUS BLD VENIPUNCTURE: CPT | Performed by: INTERNAL MEDICINE

## 2025-08-12 PROCEDURE — 250N000013 HC RX MED GY IP 250 OP 250 PS 637: Performed by: INTERNAL MEDICINE

## 2025-08-12 PROCEDURE — 80048 BASIC METABOLIC PNL TOTAL CA: CPT | Performed by: INTERNAL MEDICINE

## 2025-08-12 PROCEDURE — 250N000013 HC RX MED GY IP 250 OP 250 PS 637: Performed by: STUDENT IN AN ORGANIZED HEALTH CARE EDUCATION/TRAINING PROGRAM

## 2025-08-12 PROCEDURE — 250N000011 HC RX IP 250 OP 636: Performed by: INTERNAL MEDICINE

## 2025-08-12 PROCEDURE — 250N000009 HC RX 250: Performed by: INTERNAL MEDICINE

## 2025-08-12 RX ORDER — VANCOMYCIN HYDROCHLORIDE 125 MG/1
125 CAPSULE ORAL 4 TIMES DAILY
Refills: 0 | DISCHARGE
Start: 2025-08-12 | End: 2025-08-26

## 2025-08-12 RX ORDER — OXYCODONE HYDROCHLORIDE 10 MG/1
10 TABLET ORAL
Qty: 10 TABLET | Status: SHIPPED | DISCHARGE
Start: 2025-08-12 | End: 2025-08-13

## 2025-08-12 RX ADMIN — OXYCODONE HYDROCHLORIDE 10 MG: 10 TABLET ORAL at 13:38

## 2025-08-12 RX ADMIN — ALBUTEROL SULFATE 2 PUFF: 90 AEROSOL, METERED RESPIRATORY (INHALATION) at 09:30

## 2025-08-12 RX ADMIN — INSULIN ASPART 1 UNITS: 100 INJECTION, SOLUTION INTRAVENOUS; SUBCUTANEOUS at 09:37

## 2025-08-12 RX ADMIN — CYCLOBENZAPRINE 10 MG: 10 TABLET, FILM COATED ORAL at 09:32

## 2025-08-12 RX ADMIN — OXYCODONE HYDROCHLORIDE 10 MG: 10 TABLET ORAL at 05:29

## 2025-08-12 RX ADMIN — ONDANSETRON 4 MG: 2 INJECTION, SOLUTION INTRAMUSCULAR; INTRAVENOUS at 00:26

## 2025-08-12 RX ADMIN — SCOPOLAMINE 1 PATCH: 1.5 PATCH, EXTENDED RELEASE TRANSDERMAL at 12:02

## 2025-08-12 RX ADMIN — OXYCODONE HYDROCHLORIDE 10 MG: 10 TABLET ORAL at 00:26

## 2025-08-12 RX ADMIN — ALBUTEROL SULFATE 2 PUFF: 90 AEROSOL, METERED RESPIRATORY (INHALATION) at 03:40

## 2025-08-12 RX ADMIN — BUPROPION HYDROCHLORIDE 150 MG: 150 TABLET, EXTENDED RELEASE ORAL at 09:31

## 2025-08-12 RX ADMIN — APIXABAN 5 MG: 5 TABLET, FILM COATED ORAL at 09:39

## 2025-08-12 RX ADMIN — INSULIN ASPART 4 UNITS: 100 INJECTION, SOLUTION INTRAVENOUS; SUBCUTANEOUS at 12:04

## 2025-08-12 RX ADMIN — VANCOMYCIN HYDROCHLORIDE 125 MG: 125 CAPSULE ORAL at 13:38

## 2025-08-12 RX ADMIN — ONDANSETRON 4 MG: 4 TABLET, ORALLY DISINTEGRATING ORAL at 09:30

## 2025-08-12 RX ADMIN — VANCOMYCIN HYDROCHLORIDE 125 MG: 125 CAPSULE ORAL at 09:32

## 2025-08-12 RX ADMIN — OXYCODONE HYDROCHLORIDE 10 MG: 10 TABLET ORAL at 09:31

## 2025-08-12 ASSESSMENT — ACTIVITIES OF DAILY LIVING (ADL)
ADLS_ACUITY_SCORE: 57

## 2025-08-13 ENCOUNTER — TRANSITIONAL CARE UNIT VISIT (OUTPATIENT)
Dept: GERIATRICS | Facility: CLINIC | Age: 55
End: 2025-08-13
Payer: MEDICARE

## 2025-08-13 ENCOUNTER — DOCUMENTATION ONLY (OUTPATIENT)
Dept: GERIATRICS | Facility: CLINIC | Age: 55
End: 2025-08-13

## 2025-08-13 VITALS
HEART RATE: 107 BPM | DIASTOLIC BLOOD PRESSURE: 62 MMHG | TEMPERATURE: 97.8 F | BODY MASS INDEX: 27.16 KG/M2 | OXYGEN SATURATION: 96 % | WEIGHT: 169 LBS | SYSTOLIC BLOOD PRESSURE: 101 MMHG | RESPIRATION RATE: 18 BRPM | HEIGHT: 66 IN

## 2025-08-13 DIAGNOSIS — F11.20 CONTINUOUS OPIOID DEPENDENCE (H): ICD-10-CM

## 2025-08-13 DIAGNOSIS — A41.9 SEPSIS WITHOUT ACUTE ORGAN DYSFUNCTION, DUE TO UNSPECIFIED ORGANISM (H): Primary | ICD-10-CM

## 2025-08-13 DIAGNOSIS — R11.0 NAUSEA: ICD-10-CM

## 2025-08-13 DIAGNOSIS — I82.0 HEPATIC VEIN THROMBOSIS (H): ICD-10-CM

## 2025-08-13 DIAGNOSIS — E03.9 ACQUIRED HYPOTHYROIDISM: ICD-10-CM

## 2025-08-13 DIAGNOSIS — A04.72 C. DIFFICILE COLITIS: ICD-10-CM

## 2025-08-13 DIAGNOSIS — I82.411 ACUTE DEEP VEIN THROMBOSIS (DVT) OF FEMORAL VEIN OF RIGHT LOWER EXTREMITY (H): ICD-10-CM

## 2025-08-13 DIAGNOSIS — G89.4 CHRONIC PAIN SYNDROME: ICD-10-CM

## 2025-08-13 DIAGNOSIS — M62.81 GENERALIZED MUSCLE WEAKNESS: ICD-10-CM

## 2025-08-13 DIAGNOSIS — I26.99 ACUTE PULMONARY EMBOLISM, UNSPECIFIED PULMONARY EMBOLISM TYPE, UNSPECIFIED WHETHER ACUTE COR PULMONALE PRESENT (H): ICD-10-CM

## 2025-08-13 DIAGNOSIS — G89.21 CHRONIC PAIN DUE TO TRAUMA: ICD-10-CM

## 2025-08-13 DIAGNOSIS — E11.10 DM (DIABETES MELLITUS) TYPE 2, UNCONTROLLED, WITH KETOACIDOSIS (H): ICD-10-CM

## 2025-08-13 LAB
BACTERIA SPEC CULT: NO GROWTH
BACTERIA SPEC CULT: NO GROWTH

## 2025-08-13 PROCEDURE — 99310 SBSQ NF CARE HIGH MDM 45: CPT

## 2025-08-13 PROCEDURE — 86481 TB AG RESPONSE T-CELL SUSP: CPT

## 2025-08-13 RX ORDER — OXYCODONE HYDROCHLORIDE 10 MG/1
10 TABLET ORAL
Qty: 30 TABLET | Refills: 0 | Status: SHIPPED | OUTPATIENT
Start: 2025-08-13

## 2025-08-14 LAB
GAMMA INTERFERON BACKGROUND BLD IA-ACNC: 0.05 IU/ML
M TB IFN-G BLD-IMP: NEGATIVE
M TB IFN-G CD4+ BCKGRND COR BLD-ACNC: 9.95 IU/ML
MITOGEN IGNF BCKGRD COR BLD-ACNC: 0 IU/ML
MITOGEN IGNF BCKGRD COR BLD-ACNC: 0.01 IU/ML
QUANTIFERON MITOGEN: 10 IU/ML
QUANTIFERON NIL TUBE: 0.05 IU/ML
QUANTIFERON TB1 TUBE: 0.06 IU/ML
QUANTIFERON TB2 TUBE: 0.05

## 2025-08-18 ENCOUNTER — TRANSITIONAL CARE UNIT VISIT (OUTPATIENT)
Dept: GERIATRICS | Facility: CLINIC | Age: 55
End: 2025-08-18
Payer: MEDICARE

## 2025-08-18 VITALS
HEART RATE: 102 BPM | HEIGHT: 66 IN | TEMPERATURE: 98.2 F | DIASTOLIC BLOOD PRESSURE: 75 MMHG | OXYGEN SATURATION: 92 % | RESPIRATION RATE: 16 BRPM | BODY MASS INDEX: 26.68 KG/M2 | SYSTOLIC BLOOD PRESSURE: 109 MMHG | WEIGHT: 166 LBS

## 2025-08-18 DIAGNOSIS — E11.10 DM (DIABETES MELLITUS) TYPE 2, UNCONTROLLED, WITH KETOACIDOSIS (H): ICD-10-CM

## 2025-08-18 DIAGNOSIS — I82.411 ACUTE DEEP VEIN THROMBOSIS (DVT) OF FEMORAL VEIN OF RIGHT LOWER EXTREMITY (H): ICD-10-CM

## 2025-08-18 DIAGNOSIS — A04.72 C. DIFFICILE COLITIS: ICD-10-CM

## 2025-08-18 DIAGNOSIS — G47.00 INSOMNIA, UNSPECIFIED TYPE: ICD-10-CM

## 2025-08-18 DIAGNOSIS — A41.9 SEPSIS WITHOUT ACUTE ORGAN DYSFUNCTION, DUE TO UNSPECIFIED ORGANISM (H): Primary | ICD-10-CM

## 2025-08-18 DIAGNOSIS — R11.0 NAUSEA: ICD-10-CM

## 2025-08-18 DIAGNOSIS — I26.99 ACUTE PULMONARY EMBOLISM, UNSPECIFIED PULMONARY EMBOLISM TYPE, UNSPECIFIED WHETHER ACUTE COR PULMONALE PRESENT (H): ICD-10-CM

## 2025-08-18 DIAGNOSIS — E03.9 ACQUIRED HYPOTHYROIDISM: ICD-10-CM

## 2025-08-18 DIAGNOSIS — F41.9 ANXIETY: ICD-10-CM

## 2025-08-18 DIAGNOSIS — F11.20 CONTINUOUS OPIOID DEPENDENCE (H): ICD-10-CM

## 2025-08-18 DIAGNOSIS — G89.21 CHRONIC PAIN DUE TO TRAUMA: ICD-10-CM

## 2025-08-18 DIAGNOSIS — F33.9 EPISODE OF RECURRENT MAJOR DEPRESSIVE DISORDER, UNSPECIFIED DEPRESSION EPISODE SEVERITY: ICD-10-CM

## 2025-08-18 DIAGNOSIS — G89.4 CHRONIC PAIN SYNDROME: ICD-10-CM

## 2025-08-18 DIAGNOSIS — M62.81 GENERALIZED MUSCLE WEAKNESS: ICD-10-CM

## 2025-08-18 DIAGNOSIS — I82.0 HEPATIC VEIN THROMBOSIS (H): ICD-10-CM

## 2025-08-18 DIAGNOSIS — K59.00 CONSTIPATION, UNSPECIFIED CONSTIPATION TYPE: ICD-10-CM

## 2025-08-19 RX ORDER — DOCUSATE SODIUM 100 MG/1
100 CAPSULE, LIQUID FILLED ORAL 2 TIMES DAILY
COMMUNITY

## 2025-08-19 RX ORDER — TRAZODONE HYDROCHLORIDE 50 MG/1
50 TABLET ORAL AT BEDTIME
COMMUNITY

## 2025-08-19 RX ORDER — SCOPOLAMINE 1 MG/3D
1 PATCH, EXTENDED RELEASE TRANSDERMAL
COMMUNITY

## 2025-08-19 RX ORDER — INSULIN GLARGINE-YFGN 100 [IU]/ML
13 INJECTION, SOLUTION SUBCUTANEOUS 2 TIMES DAILY
COMMUNITY
Start: 2025-08-19

## 2025-08-19 RX ORDER — OXYCODONE HYDROCHLORIDE 10 MG/1
10 TABLET ORAL
COMMUNITY
Start: 2025-08-19

## 2025-08-20 ENCOUNTER — DISCHARGE SUMMARY NURSING HOME (OUTPATIENT)
Dept: GERIATRICS | Facility: CLINIC | Age: 55
End: 2025-08-20
Payer: MEDICARE

## 2025-08-20 VITALS
OXYGEN SATURATION: 95 % | TEMPERATURE: 98.2 F | SYSTOLIC BLOOD PRESSURE: 143 MMHG | DIASTOLIC BLOOD PRESSURE: 90 MMHG | BODY MASS INDEX: 26.68 KG/M2 | HEART RATE: 94 BPM | RESPIRATION RATE: 18 BRPM | WEIGHT: 166 LBS | HEIGHT: 66 IN

## 2025-08-20 DIAGNOSIS — F33.9 EPISODE OF RECURRENT MAJOR DEPRESSIVE DISORDER, UNSPECIFIED DEPRESSION EPISODE SEVERITY: ICD-10-CM

## 2025-08-20 DIAGNOSIS — I82.0 HEPATIC VEIN THROMBOSIS (H): ICD-10-CM

## 2025-08-20 DIAGNOSIS — I26.99 ACUTE PULMONARY EMBOLISM, UNSPECIFIED PULMONARY EMBOLISM TYPE, UNSPECIFIED WHETHER ACUTE COR PULMONALE PRESENT (H): ICD-10-CM

## 2025-08-20 DIAGNOSIS — G89.4 CHRONIC PAIN SYNDROME: ICD-10-CM

## 2025-08-20 DIAGNOSIS — K59.00 CONSTIPATION, UNSPECIFIED CONSTIPATION TYPE: ICD-10-CM

## 2025-08-20 DIAGNOSIS — A41.9 SEPSIS WITHOUT ACUTE ORGAN DYSFUNCTION, DUE TO UNSPECIFIED ORGANISM (H): Primary | ICD-10-CM

## 2025-08-20 DIAGNOSIS — G47.00 INSOMNIA, UNSPECIFIED TYPE: ICD-10-CM

## 2025-08-20 DIAGNOSIS — I82.411 ACUTE DEEP VEIN THROMBOSIS (DVT) OF FEMORAL VEIN OF RIGHT LOWER EXTREMITY (H): ICD-10-CM

## 2025-08-20 DIAGNOSIS — F11.20 CONTINUOUS OPIOID DEPENDENCE (H): ICD-10-CM

## 2025-08-20 DIAGNOSIS — M62.81 GENERALIZED MUSCLE WEAKNESS: ICD-10-CM

## 2025-08-20 DIAGNOSIS — E03.9 ACQUIRED HYPOTHYROIDISM: ICD-10-CM

## 2025-08-20 DIAGNOSIS — A04.72 C. DIFFICILE COLITIS: ICD-10-CM

## 2025-08-20 DIAGNOSIS — F41.9 ANXIETY: ICD-10-CM

## 2025-08-20 DIAGNOSIS — E11.10 DM (DIABETES MELLITUS) TYPE 2, UNCONTROLLED, WITH KETOACIDOSIS (H): ICD-10-CM

## 2025-08-20 DIAGNOSIS — R11.0 NAUSEA: ICD-10-CM

## 2025-08-25 ENCOUNTER — HOSPITAL ENCOUNTER (INPATIENT)
Facility: CLINIC | Age: 55
End: 2025-08-25
Attending: EMERGENCY MEDICINE | Admitting: HOSPITALIST
Payer: MEDICARE

## 2025-08-25 PROBLEM — R55 SYNCOPE, UNSPECIFIED SYNCOPE TYPE: Status: ACTIVE | Noted: 2025-08-25

## 2025-08-25 PROBLEM — R73.9 HYPERGLYCEMIA: Status: ACTIVE | Noted: 2025-08-25

## 2025-08-25 ASSESSMENT — ACTIVITIES OF DAILY LIVING (ADL)
ADLS_ACUITY_SCORE: 61
ADLS_ACUITY_SCORE: 61
ADLS_ACUITY_SCORE: 58
ADLS_ACUITY_SCORE: 59
ADLS_ACUITY_SCORE: 58
ADLS_ACUITY_SCORE: 59
ADLS_ACUITY_SCORE: 58
ADLS_ACUITY_SCORE: 59
ADLS_ACUITY_SCORE: 58
ADLS_ACUITY_SCORE: 61
ADLS_ACUITY_SCORE: 58
DEPENDENT_IADLS:: INDEPENDENT
ADLS_ACUITY_SCORE: 59

## 2025-08-25 ASSESSMENT — COLUMBIA-SUICIDE SEVERITY RATING SCALE - C-SSRS
2. HAVE YOU ACTUALLY HAD ANY THOUGHTS OF KILLING YOURSELF IN THE PAST MONTH?: NO
1. IN THE PAST MONTH, HAVE YOU WISHED YOU WERE DEAD OR WISHED YOU COULD GO TO SLEEP AND NOT WAKE UP?: NO
6. HAVE YOU EVER DONE ANYTHING, STARTED TO DO ANYTHING, OR PREPARED TO DO ANYTHING TO END YOUR LIFE?: NO

## 2025-08-26 PROBLEM — R55 RECURRENT SYNCOPE: Status: ACTIVE | Noted: 2025-08-26

## 2025-08-26 ASSESSMENT — ACTIVITIES OF DAILY LIVING (ADL)
ADLS_ACUITY_SCORE: 58

## 2025-08-27 ENCOUNTER — APPOINTMENT (OUTPATIENT)
Dept: PHYSICAL THERAPY | Facility: CLINIC | Age: 55
End: 2025-08-27
Payer: MEDICARE

## 2025-08-27 ASSESSMENT — ACTIVITIES OF DAILY LIVING (ADL)
ADLS_ACUITY_SCORE: 56
ADLS_ACUITY_SCORE: 56
DOING_ERRANDS_INDEPENDENTLY_DIFFICULTY: YES
DRESSING/BATHING_DIFFICULTY: YES
ADLS_ACUITY_SCORE: 58
ADLS_ACUITY_SCORE: 56
ADLS_ACUITY_SCORE: 58
ADLS_ACUITY_SCORE: 58
ADLS_ACUITY_SCORE: 56
ADLS_ACUITY_SCORE: 56
TOILETING_ISSUES: NO
DIFFICULTY_EATING/SWALLOWING: NO
ADLS_ACUITY_SCORE: 56
ADLS_ACUITY_SCORE: 58
ADLS_ACUITY_SCORE: 58
ADLS_ACUITY_SCORE: 56
ADLS_ACUITY_SCORE: 56
WALKING_OR_CLIMBING_STAIRS: STAIR CLIMBING DIFFICULTY, DEPENDENT;AMBULATION DIFFICULTY, REQUIRES EQUIPMENT
ADLS_ACUITY_SCORE: 56
WEAR_GLASSES_OR_BLIND: YES
ADLS_ACUITY_SCORE: 56
NUMBER_OF_TIMES_PATIENT_HAS_FALLEN_WITHIN_LAST_SIX_MONTHS: 8
ADLS_ACUITY_SCORE: 58
EQUIPMENT_CURRENTLY_USED_AT_HOME: CANE, QUAD;WALKER, ROLLING
DRESSING/BATHING: BATHING DIFFICULTY, REQUIRES EQUIPMENT
WALKING_OR_CLIMBING_STAIRS_DIFFICULTY: YES
ADLS_ACUITY_SCORE: 58
FALL_HISTORY_WITHIN_LAST_SIX_MONTHS: YES
DIFFICULTY_COMMUNICATING: NO
CONCENTRATING,_REMEMBERING_OR_MAKING_DECISIONS_DIFFICULTY: YES
ADLS_ACUITY_SCORE: 58
ADLS_ACUITY_SCORE: 56

## 2025-08-28 ASSESSMENT — ACTIVITIES OF DAILY LIVING (ADL)
ADLS_ACUITY_SCORE: 56

## 2025-08-29 ASSESSMENT — ACTIVITIES OF DAILY LIVING (ADL)
ADLS_ACUITY_SCORE: 56

## 2025-08-30 ASSESSMENT — ACTIVITIES OF DAILY LIVING (ADL)
ADLS_ACUITY_SCORE: 56
ADLS_ACUITY_SCORE: 56
ADLS_ACUITY_SCORE: 57
ADLS_ACUITY_SCORE: 56
ADLS_ACUITY_SCORE: 57
ADLS_ACUITY_SCORE: 57
ADLS_ACUITY_SCORE: 56

## 2025-08-31 ASSESSMENT — ACTIVITIES OF DAILY LIVING (ADL)
ADLS_ACUITY_SCORE: 57

## 2025-09-01 ASSESSMENT — ACTIVITIES OF DAILY LIVING (ADL)
ADLS_ACUITY_SCORE: 57

## 2025-09-02 ASSESSMENT — ACTIVITIES OF DAILY LIVING (ADL)
ADLS_ACUITY_SCORE: 57

## 2025-09-03 ASSESSMENT — ACTIVITIES OF DAILY LIVING (ADL)
ADLS_ACUITY_SCORE: 57
ADLS_ACUITY_SCORE: 60
ADLS_ACUITY_SCORE: 57
ADLS_ACUITY_SCORE: 56
ADLS_ACUITY_SCORE: 60
ADLS_ACUITY_SCORE: 57
ADLS_ACUITY_SCORE: 56
ADLS_ACUITY_SCORE: 57
ADLS_ACUITY_SCORE: 56
ADLS_ACUITY_SCORE: 56
ADLS_ACUITY_SCORE: 57
ADLS_ACUITY_SCORE: 60
ADLS_ACUITY_SCORE: 57
ADLS_ACUITY_SCORE: 56
ADLS_ACUITY_SCORE: 57
ADLS_ACUITY_SCORE: 60
ADLS_ACUITY_SCORE: 56
ADLS_ACUITY_SCORE: 57

## 2025-09-04 ASSESSMENT — ACTIVITIES OF DAILY LIVING (ADL)
ADLS_ACUITY_SCORE: 56
ADLS_ACUITY_SCORE: 59
ADLS_ACUITY_SCORE: 56
ADLS_ACUITY_SCORE: 59
ADLS_ACUITY_SCORE: 56
ADLS_ACUITY_SCORE: 59
ADLS_ACUITY_SCORE: 56